# Patient Record
Sex: MALE | Race: BLACK OR AFRICAN AMERICAN | NOT HISPANIC OR LATINO | URBAN - METROPOLITAN AREA
[De-identification: names, ages, dates, MRNs, and addresses within clinical notes are randomized per-mention and may not be internally consistent; named-entity substitution may affect disease eponyms.]

---

## 2017-08-10 ENCOUNTER — EMERGENCY (EMERGENCY)
Facility: HOSPITAL | Age: 82
LOS: 0 days | Discharge: HOME | End: 2017-08-11
Attending: EMERGENCY MEDICINE
Payer: COMMERCIAL

## 2017-08-10 VITALS
TEMPERATURE: 98 F | SYSTOLIC BLOOD PRESSURE: 120 MMHG | HEART RATE: 104 BPM | RESPIRATION RATE: 18 BRPM | OXYGEN SATURATION: 99 % | WEIGHT: 165.35 LBS | DIASTOLIC BLOOD PRESSURE: 65 MMHG

## 2017-08-10 DIAGNOSIS — Z79.82 LONG TERM (CURRENT) USE OF ASPIRIN: ICD-10-CM

## 2017-08-10 DIAGNOSIS — I10 ESSENTIAL (PRIMARY) HYPERTENSION: ICD-10-CM

## 2017-08-10 DIAGNOSIS — M19.90 UNSPECIFIED OSTEOARTHRITIS, UNSPECIFIED SITE: ICD-10-CM

## 2017-08-10 DIAGNOSIS — E11.9 TYPE 2 DIABETES MELLITUS WITHOUT COMPLICATIONS: ICD-10-CM

## 2017-08-10 DIAGNOSIS — M79.602 PAIN IN LEFT ARM: ICD-10-CM

## 2017-08-10 LAB
ALBUMIN SERPL ELPH-MCNC: 3.6 G/DL — SIGNIFICANT CHANGE UP (ref 3.3–5)
ALP SERPL-CCNC: 333 U/L — HIGH (ref 40–120)
ALT FLD-CCNC: 23 U/L — SIGNIFICANT CHANGE UP (ref 12–78)
ANION GAP SERPL CALC-SCNC: 10 MMOL/L — SIGNIFICANT CHANGE UP (ref 5–17)
AST SERPL-CCNC: 18 U/L — SIGNIFICANT CHANGE UP (ref 15–37)
BILIRUB SERPL-MCNC: 0.5 MG/DL — SIGNIFICANT CHANGE UP (ref 0.2–1.2)
BUN SERPL-MCNC: 29 MG/DL — HIGH (ref 7–23)
CALCIUM SERPL-MCNC: 9.3 MG/DL — SIGNIFICANT CHANGE UP (ref 8.5–10.1)
CHLORIDE SERPL-SCNC: 99 MMOL/L — SIGNIFICANT CHANGE UP (ref 96–108)
CO2 SERPL-SCNC: 27 MMOL/L — SIGNIFICANT CHANGE UP (ref 22–31)
CREAT SERPL-MCNC: 1.67 MG/DL — HIGH (ref 0.5–1.3)
ERYTHROCYTE [SEDIMENTATION RATE] IN BLOOD: 86 MM/HR — HIGH (ref 0–20)
GLUCOSE SERPL-MCNC: 229 MG/DL — HIGH (ref 70–99)
HCT VFR BLD CALC: 36.6 % — LOW (ref 39–50)
HGB BLD-MCNC: 11.5 G/DL — LOW (ref 13–17)
MCHC RBC-ENTMCNC: 26.3 PG — LOW (ref 27–34)
MCHC RBC-ENTMCNC: 31.5 GM/DL — LOW (ref 32–36)
MCV RBC AUTO: 83.4 FL — SIGNIFICANT CHANGE UP (ref 80–100)
PLATELET # BLD AUTO: 223 K/UL — SIGNIFICANT CHANGE UP (ref 150–400)
POTASSIUM SERPL-MCNC: 3.6 MMOL/L — SIGNIFICANT CHANGE UP (ref 3.5–5.3)
POTASSIUM SERPL-SCNC: 3.6 MMOL/L — SIGNIFICANT CHANGE UP (ref 3.5–5.3)
PROT SERPL-MCNC: 8.1 GM/DL — SIGNIFICANT CHANGE UP (ref 6–8.3)
RBC # BLD: 4.39 M/UL — SIGNIFICANT CHANGE UP (ref 4.2–5.8)
RBC # FLD: 13.1 % — SIGNIFICANT CHANGE UP (ref 11–15)
SODIUM SERPL-SCNC: 136 MMOL/L — SIGNIFICANT CHANGE UP (ref 135–145)
TROPONIN I SERPL-MCNC: <.015 NG/ML — SIGNIFICANT CHANGE UP (ref 0.01–0.04)
WBC # BLD: 7.7 K/UL — SIGNIFICANT CHANGE UP (ref 3.8–10.5)
WBC # FLD AUTO: 7.7 K/UL — SIGNIFICANT CHANGE UP (ref 3.8–10.5)

## 2017-08-10 PROCEDURE — 73070 X-RAY EXAM OF ELBOW: CPT | Mod: 26,LT

## 2017-08-10 PROCEDURE — 73110 X-RAY EXAM OF WRIST: CPT | Mod: 26,LT

## 2017-08-10 PROCEDURE — 71010: CPT | Mod: 26

## 2017-08-10 PROCEDURE — 99285 EMERGENCY DEPT VISIT HI MDM: CPT

## 2017-08-10 RX ORDER — MORPHINE SULFATE 50 MG/1
2 CAPSULE, EXTENDED RELEASE ORAL ONCE
Qty: 0 | Refills: 0 | Status: DISCONTINUED | OUTPATIENT
Start: 2017-08-10 | End: 2017-08-10

## 2017-08-10 RX ORDER — ACETAMINOPHEN 500 MG
975 TABLET ORAL ONCE
Qty: 0 | Refills: 0 | Status: COMPLETED | OUTPATIENT
Start: 2017-08-10 | End: 2017-08-10

## 2017-08-10 RX ADMIN — MORPHINE SULFATE 2 MILLIGRAM(S): 50 CAPSULE, EXTENDED RELEASE ORAL at 22:08

## 2017-08-10 RX ADMIN — Medication 975 MILLIGRAM(S): at 22:08

## 2017-08-10 NOTE — ED PROVIDER NOTE - OBJECTIVE STATEMENT
89 yo M with acute LUE swelling for 3 days.  Started Monday morning on waking.  Pt. felt severe pain in his joint and couldn't move them.  Pt. denies trauma.  No other complaints currently, no inciting event.    ROS: negative for fever, cough, headache, chest pain, shortness of breath, abd pain, nausea, vomiting, diarrhea, rash, paresthesia, and weakness.   PMH: HTN, IDDM, HLD; Meds: furosemide 20, simvastatin 10, asa 325, nifedipine 60, glipizide 10, levemir insulin; SH: Denies smoking/drinking/drug use

## 2017-08-10 NOTE — ED ADULT TRIAGE NOTE - CHIEF COMPLAINT QUOTE
pt sent from pmd  today for left arm pain 3-4 days, denies injury painful and swollen, No hx gout med hx dm and cholesterol

## 2017-08-10 NOTE — ED PROVIDER NOTE - PHYSICAL EXAMINATION
Vitals: tachy 104, otherwise wnl  Gen: AAOx3, NAD, sitting comfortably in stretcher  Head: ncat, perrla, eomi b/l  Neck: supple, no lymphadenopathy, no midline deviation  Heart: rrr, no m/r/g  Lungs: CTA b/l, no rales/ronchi/wheezes  Abd: soft, nontender, non-distended, no rebound or guarding  Ext: no clubbing/cyanosis/edema, tenderness to palpation of L elbow and L wrist, mild swelling of both areas, no skin lesions or erythema, no signs of infection, limited rom at L elbow and wrist due to pain, no gross deformities.  Pt. has decreased movement and sensation of L digits 2-3 due to prior laceration years ago, no pitting edema, normal cap refill  Neuro: sensation and muscle strength intact b/l, steady gait

## 2017-08-10 NOTE — ED PROVIDER NOTE - MEDICAL DECISION MAKING DETAILS
89 yo M with acute LUE pain and swelling, joint pain  -basic labs, trop, uric acid, crp, esr, us LUE, XR L elbow and wrist, cxr, ekg, pain control  -f/u results

## 2017-08-10 NOTE — ED PROVIDER NOTE - PROGRESS NOTE DETAILS
Vira Lee is pt.'s daughter, leaves contact info  [phone] us negative for DVT, XR shows old healed elbow fracture and soft tissue swelling in the wrist and elbow, also small old fracture of distal radius.  awaiting another elbow view XR for ortho, will likely d/c to daughter in AM attempted calling family, pt. is ready for d/c with outpt. ortho and pcp f/u  Results reported to patient--grossly benign  Pt. reports feeling better after meds  pt. agrees to f/u with primary care outpt.  pt. understands to return to ED if symptoms worsen; will d/c

## 2017-08-11 VITALS
HEART RATE: 79 BPM | DIASTOLIC BLOOD PRESSURE: 71 MMHG | OXYGEN SATURATION: 98 % | TEMPERATURE: 98 F | SYSTOLIC BLOOD PRESSURE: 129 MMHG | RESPIRATION RATE: 18 BRPM

## 2017-08-11 LAB
CRP SERPL-MCNC: 10.6 MG/DL — HIGH (ref 0–0.4)
URATE SERPL-MCNC: 10.9 MG/DL — HIGH (ref 3.4–8.8)

## 2017-08-11 PROCEDURE — 73070 X-RAY EXAM OF ELBOW: CPT | Mod: 26,LT

## 2017-08-11 PROCEDURE — 93010 ELECTROCARDIOGRAM REPORT: CPT

## 2017-08-11 PROCEDURE — 93971 EXTREMITY STUDY: CPT | Mod: 26,LT

## 2017-08-11 NOTE — CONSULT NOTE ADULT - ASSESSMENT
89 M s/p atraumatic pain L Elbow/Wrist x 3 days  -pain likely secondary to gout as he has no signs/symptoms of infection with nml WBC, no fever  -Chronic fracture of L Olecranon and Radial neck, no acute fractures appreciated on x-rays  -Sling for comfort as needed  -Medical management for likely gout, if clincal signs of infection, would consider aspiration of elbow joint  -WBAT  -FU with Orthopedics outpatient PRN, Dr. Thacker

## 2017-08-11 NOTE — CONSULT NOTE ADULT - SUBJECTIVE AND OBJECTIVE BOX
Patient seen & examined in the ER for atraumatic L elbow and wrist pain x 3 days.  Per the patient and daughter (Vira), the pain started suddenly in the wrist and elbow on Monday.  Pain is localized to the wrist and elbow joints with no radiation.  Denies falls, trauma or any other inciting event.  Patient does admit to a fall ~2 yrs prior in which he injured the left elbow.  Denies  new onset numbness or parasthesias of the left arm.  Denies fever/chills.  Patient lives alone and normally ambulates with a walker.    PMH: HLD, HTN, DM  ALL: NKDA    PE:    Afebrile, AVSS    LUE:  skin intact, no erythema/ecchymosis, no lesions  +TTP Left elbow and wrist joints  Mild edema of wrist and elbow with moderate pain to ROM  PROM Elbow , FROM wrist with moderate pain  FROM Shoulder without pain, no TTP shoulder  SILT distally  2+ Rad pulse  Compartments soft  +AIN/PIN/U/R/M n's hand    Imaging:  X-rays L Elbow/Wrist demonstrate chronic olecranon and radial neck fractures with subluxation of the ulnohumeral joint, no acute fractures noted, degenerative changes of the radiohumeral/ulnohumeral joints and carpal joints of the hand.    Labs:  ESR 86/CRP pending  WBC 7.7

## 2017-08-11 NOTE — ED ADULT NURSE NOTE - OBJECTIVE STATEMENT
Pt is 89 y/o male c/o of left arm pain/ swelling. pt denies injury. pt can move fingers, has sensation in extremity

## 2018-02-15 ENCOUNTER — INPATIENT (INPATIENT)
Facility: HOSPITAL | Age: 83
LOS: 6 days | Discharge: NURSING, REHAB OR LONG-TERM CARE | End: 2018-02-22
Attending: FAMILY MEDICINE | Admitting: FAMILY MEDICINE
Payer: COMMERCIAL

## 2018-02-15 VITALS
DIASTOLIC BLOOD PRESSURE: 44 MMHG | WEIGHT: 145.06 LBS | HEART RATE: 56 BPM | RESPIRATION RATE: 17 BRPM | SYSTOLIC BLOOD PRESSURE: 92 MMHG | OXYGEN SATURATION: 96 % | HEIGHT: 65 IN

## 2018-02-15 LAB
GLUCOSE BLDC GLUCOMTR-MCNC: 109 MG/DL — HIGH (ref 70–99)
GLUCOSE BLDC GLUCOMTR-MCNC: 85 MG/DL — SIGNIFICANT CHANGE UP (ref 70–99)

## 2018-02-15 PROCEDURE — 99285 EMERGENCY DEPT VISIT HI MDM: CPT

## 2018-02-15 NOTE — ED ADULT NURSE NOTE - OBJECTIVE STATEMENT
89 y/o male PMHx DM, HTN BIBA for hypoglycemia, found by daughter on kitchen floor, FS 40 at home, D50 given by EMS. Patient awake and AAOX1 at this time, accodring to daughter, patient fell three times tis week

## 2018-02-15 NOTE — ED ADULT TRIAGE NOTE - CHIEF COMPLAINT QUOTE
hypoglycemia 44 , one d50 given 18 left a/c, fall daughter found him on the floor unknown how long was him on the floor

## 2018-02-16 DIAGNOSIS — R93.8 ABNORMAL FINDINGS ON DIAGNOSTIC IMAGING OF OTHER SPECIFIED BODY STRUCTURES: ICD-10-CM

## 2018-02-16 DIAGNOSIS — E16.2 HYPOGLYCEMIA, UNSPECIFIED: ICD-10-CM

## 2018-02-16 DIAGNOSIS — Z65.9 PROBLEM RELATED TO UNSPECIFIED PSYCHOSOCIAL CIRCUMSTANCES: ICD-10-CM

## 2018-02-16 DIAGNOSIS — E11.649 TYPE 2 DIABETES MELLITUS WITH HYPOGLYCEMIA WITHOUT COMA: ICD-10-CM

## 2018-02-16 DIAGNOSIS — Z29.9 ENCOUNTER FOR PROPHYLACTIC MEASURES, UNSPECIFIED: ICD-10-CM

## 2018-02-16 DIAGNOSIS — I10 ESSENTIAL (PRIMARY) HYPERTENSION: ICD-10-CM

## 2018-02-16 LAB
ALBUMIN SERPL ELPH-MCNC: 2.9 G/DL — LOW (ref 3.3–5)
ALP SERPL-CCNC: 414 U/L — HIGH (ref 40–120)
ALT FLD-CCNC: 16 U/L — SIGNIFICANT CHANGE UP (ref 12–78)
ANION GAP SERPL CALC-SCNC: 10 MMOL/L — SIGNIFICANT CHANGE UP (ref 5–17)
APPEARANCE UR: CLEAR — SIGNIFICANT CHANGE UP
APTT BLD: 29.6 SEC — SIGNIFICANT CHANGE UP (ref 27.5–37.4)
AST SERPL-CCNC: 29 U/L — SIGNIFICANT CHANGE UP (ref 15–37)
BACTERIA # UR AUTO: ABNORMAL
BASOPHILS # BLD AUTO: 0.01 K/UL — SIGNIFICANT CHANGE UP (ref 0–0.2)
BASOPHILS NFR BLD AUTO: 0.1 % — SIGNIFICANT CHANGE UP (ref 0–2)
BILIRUB SERPL-MCNC: 0.4 MG/DL — SIGNIFICANT CHANGE UP (ref 0.2–1.2)
BILIRUB UR-MCNC: NEGATIVE — SIGNIFICANT CHANGE UP
BUN SERPL-MCNC: 23 MG/DL — SIGNIFICANT CHANGE UP (ref 7–23)
CALCIUM SERPL-MCNC: 8.7 MG/DL — SIGNIFICANT CHANGE UP (ref 8.5–10.1)
CHLORIDE SERPL-SCNC: 103 MMOL/L — SIGNIFICANT CHANGE UP (ref 96–108)
CK MB BLD-MCNC: 1.4 % — SIGNIFICANT CHANGE UP (ref 0–3.5)
CK MB CFR SERPL CALC: 3.1 NG/ML — SIGNIFICANT CHANGE UP (ref 0.5–3.6)
CK SERPL-CCNC: 220 U/L — SIGNIFICANT CHANGE UP (ref 26–308)
CO2 SERPL-SCNC: 26 MMOL/L — SIGNIFICANT CHANGE UP (ref 22–31)
COLOR SPEC: YELLOW — SIGNIFICANT CHANGE UP
CREAT SERPL-MCNC: 1 MG/DL — SIGNIFICANT CHANGE UP (ref 0.5–1.3)
DIFF PNL FLD: ABNORMAL
EOSINOPHIL # BLD AUTO: 0 K/UL — SIGNIFICANT CHANGE UP (ref 0–0.5)
EOSINOPHIL NFR BLD AUTO: 0 % — SIGNIFICANT CHANGE UP (ref 0–6)
GLUCOSE BLDC GLUCOMTR-MCNC: 154 MG/DL — HIGH (ref 70–99)
GLUCOSE BLDC GLUCOMTR-MCNC: 62 MG/DL — LOW (ref 70–99)
GLUCOSE SERPL-MCNC: 60 MG/DL — LOW (ref 70–99)
GLUCOSE UR QL: NEGATIVE MG/DL — SIGNIFICANT CHANGE UP
HCT VFR BLD CALC: 28.6 % — LOW (ref 39–50)
HGB BLD-MCNC: 9.3 G/DL — LOW (ref 13–17)
IMM GRANULOCYTES NFR BLD AUTO: 0.6 % — SIGNIFICANT CHANGE UP (ref 0–1.5)
INR BLD: 1.22 RATIO — HIGH (ref 0.88–1.16)
KETONES UR-MCNC: NEGATIVE — SIGNIFICANT CHANGE UP
LACTATE SERPL-SCNC: 1.2 MMOL/L — SIGNIFICANT CHANGE UP (ref 0.7–2)
LEUKOCYTE ESTERASE UR-ACNC: NEGATIVE — SIGNIFICANT CHANGE UP
LYMPHOCYTES # BLD AUTO: 0.82 K/UL — LOW (ref 1–3.3)
LYMPHOCYTES # BLD AUTO: 12.1 % — LOW (ref 13–44)
MCHC RBC-ENTMCNC: 26 PG — LOW (ref 27–34)
MCHC RBC-ENTMCNC: 32.5 GM/DL — SIGNIFICANT CHANGE UP (ref 32–36)
MCV RBC AUTO: 79.9 FL — LOW (ref 80–100)
MONOCYTES # BLD AUTO: 0.81 K/UL — SIGNIFICANT CHANGE UP (ref 0–0.9)
MONOCYTES NFR BLD AUTO: 11.9 % — SIGNIFICANT CHANGE UP (ref 2–14)
NEUTROPHILS # BLD AUTO: 5.11 K/UL — SIGNIFICANT CHANGE UP (ref 1.8–7.4)
NEUTROPHILS NFR BLD AUTO: 75.3 % — SIGNIFICANT CHANGE UP (ref 43–77)
NITRITE UR-MCNC: NEGATIVE — SIGNIFICANT CHANGE UP
NRBC # BLD: 0 /100 WBCS — SIGNIFICANT CHANGE UP (ref 0–0)
PH UR: 6 — SIGNIFICANT CHANGE UP (ref 5–8)
PLATELET # BLD AUTO: 236 K/UL — SIGNIFICANT CHANGE UP (ref 150–400)
POTASSIUM SERPL-MCNC: 3.6 MMOL/L — SIGNIFICANT CHANGE UP (ref 3.5–5.3)
POTASSIUM SERPL-SCNC: 3.6 MMOL/L — SIGNIFICANT CHANGE UP (ref 3.5–5.3)
PROT SERPL-MCNC: 6.8 GM/DL — SIGNIFICANT CHANGE UP (ref 6–8.3)
PROT UR-MCNC: 30 MG/DL
PROTHROM AB SERPL-ACNC: 13.4 SEC — HIGH (ref 9.8–12.7)
RBC # BLD: 3.58 M/UL — LOW (ref 4.2–5.8)
RBC # FLD: 17.6 % — HIGH (ref 10.3–14.5)
RBC CASTS # UR COMP ASSIST: ABNORMAL /HPF (ref 0–4)
SODIUM SERPL-SCNC: 139 MMOL/L — SIGNIFICANT CHANGE UP (ref 135–145)
SP GR SPEC: 1.01 — SIGNIFICANT CHANGE UP (ref 1.01–1.02)
TROPONIN I SERPL-MCNC: <.015 NG/ML — SIGNIFICANT CHANGE UP (ref 0.01–0.04)
UROBILINOGEN FLD QL: NEGATIVE MG/DL — SIGNIFICANT CHANGE UP
WBC # BLD: 6.79 K/UL — SIGNIFICANT CHANGE UP (ref 3.8–10.5)
WBC # FLD AUTO: 6.79 K/UL — SIGNIFICANT CHANGE UP (ref 3.8–10.5)
WBC UR QL: SIGNIFICANT CHANGE UP

## 2018-02-16 PROCEDURE — 93010 ELECTROCARDIOGRAM REPORT: CPT

## 2018-02-16 PROCEDURE — 70450 CT HEAD/BRAIN W/O DYE: CPT | Mod: 26

## 2018-02-16 PROCEDURE — 12345: CPT | Mod: NC

## 2018-02-16 PROCEDURE — 99223 1ST HOSP IP/OBS HIGH 75: CPT

## 2018-02-16 PROCEDURE — 71045 X-RAY EXAM CHEST 1 VIEW: CPT | Mod: 26

## 2018-02-16 PROCEDURE — 72125 CT NECK SPINE W/O DYE: CPT | Mod: 26

## 2018-02-16 RX ORDER — SIMVASTATIN 20 MG/1
10 TABLET, FILM COATED ORAL AT BEDTIME
Qty: 0 | Refills: 0 | Status: DISCONTINUED | OUTPATIENT
Start: 2018-02-16 | End: 2018-02-22

## 2018-02-16 RX ORDER — FUROSEMIDE 40 MG
20 TABLET ORAL DAILY
Qty: 0 | Refills: 0 | Status: DISCONTINUED | OUTPATIENT
Start: 2018-02-16 | End: 2018-02-22

## 2018-02-16 RX ORDER — LISINOPRIL 2.5 MG/1
40 TABLET ORAL DAILY
Qty: 0 | Refills: 0 | Status: DISCONTINUED | OUTPATIENT
Start: 2018-02-16 | End: 2018-02-21

## 2018-02-16 RX ORDER — SODIUM CHLORIDE 9 MG/ML
1000 INJECTION, SOLUTION INTRAVENOUS
Qty: 0 | Refills: 0 | Status: DISCONTINUED | OUTPATIENT
Start: 2018-02-16 | End: 2018-02-21

## 2018-02-16 RX ORDER — HEPARIN SODIUM 5000 [USP'U]/ML
5000 INJECTION INTRAVENOUS; SUBCUTANEOUS EVERY 8 HOURS
Qty: 0 | Refills: 0 | Status: DISCONTINUED | OUTPATIENT
Start: 2018-02-16 | End: 2018-02-22

## 2018-02-16 RX ORDER — ASPIRIN/CALCIUM CARB/MAGNESIUM 324 MG
325 TABLET ORAL DAILY
Qty: 0 | Refills: 0 | Status: DISCONTINUED | OUTPATIENT
Start: 2018-02-16 | End: 2018-02-22

## 2018-02-16 RX ADMIN — HEPARIN SODIUM 5000 UNIT(S): 5000 INJECTION INTRAVENOUS; SUBCUTANEOUS at 22:22

## 2018-02-16 RX ADMIN — SODIUM CHLORIDE 80 MILLILITER(S): 9 INJECTION, SOLUTION INTRAVENOUS at 07:05

## 2018-02-16 RX ADMIN — HEPARIN SODIUM 5000 UNIT(S): 5000 INJECTION INTRAVENOUS; SUBCUTANEOUS at 13:20

## 2018-02-16 RX ADMIN — SODIUM CHLORIDE 80 MILLILITER(S): 9 INJECTION, SOLUTION INTRAVENOUS at 11:29

## 2018-02-16 RX ADMIN — SIMVASTATIN 10 MILLIGRAM(S): 20 TABLET, FILM COATED ORAL at 22:22

## 2018-02-16 RX ADMIN — Medication 325 MILLIGRAM(S): at 11:29

## 2018-02-16 NOTE — CHART NOTE - NSCHARTNOTEFT_GEN_A_CORE
seen and examined, fs improved. Hold dm meds, f/u a1c , would hold off on dm meds if a1c<7.5  sw, nutrition/pt eval to be done                          9.3    6.79  )-----------( 236      ( 2018 03:33 )             28.6         139  |  103  |  23  ----------------------------<  60<L>  3.6   |  26  |  1.00    Ca    8.7      2018 03:33    TPro  6.8  /  Alb  2.9<L>  /  TBili  0.4  /  DBili  x   /  AST  29  /  ALT  16  /  AlkPhos  414<H>      PT/INR - ( 2018 03:33 )   PT: 13.4 sec;   INR: 1.22 ratio         PTT - ( 2018 03:33 )  PTT:29.6 sec  Urinalysis Basic - ( 2018 03:33 )    Color: Yellow / Appearance: Clear / S.010 / pH: x  Gluc: x / Ketone: Negative  / Bili: Negative / Urobili: Negative mg/dL   Blood: x / Protein: 30 mg/dL / Nitrite: Negative   Leuk Esterase: Negative / RBC: 3-5 /HPF / WBC 0-2   Sq Epi: x / Non Sq Epi: x / Bacteria: Moderate        Vital Signs Last 24 Hrs  T(C): 36.8 (2018 11:20), Max: 36.8 (2018 07:23)  T(F): 98.2 (2018 11:20), Max: 98.2 (2018 07:23)  HR: 72 (2018 11:20) (56 - 75)  BP: 154/52 (2018 11:20) (92/44 - 154/52)  BP(mean): --  RR: 17 (2018 11:20) (14 - 18)  SpO2: 99% (2018 11:20) (96% - 99%)

## 2018-02-16 NOTE — ED PROVIDER NOTE - PHYSICAL EXAMINATION
Gen: Alert, NAD, well appearing  Head: NC, +right forehead superficial abrasion, PERRL, EOMI, normal lids/conjunctiva  ENT: normal hearing, patent oropharynx without erythema/exudate, uvula midline  Neck: +supple, no tenderness/meningismus/JVD, +Trachea midline  Pulm: Bilateral BS, normal resp effort, no wheeze/stridor/retractions  CV: RRR, no M/R/G, +dist pulses  Abd: soft, NT/ND, +BS  Mskel: no edema/erythema/cyanosis  Skin: no rash, warm/dry  Neuro: No sensory/motor deficits, CN 2-12 intact

## 2018-02-16 NOTE — H&P ADULT - HISTORY OF PRESENT ILLNESS
Admitted for hypoglycemia on sulf, in progress. 89 year old man with PMH HTN, DM2, and HLD presents with complaint of low blood sugar and LOC.  As per patient he lives at home and was brought in after his family could not get in touch with him.  He states that he ate breakfast then blacked out; he cannot remember anything else and cannot provide much history.  Offers no complaints now.  He says he takes glipizide daily and has not been eating very much over the last few days.  He is normally compliant with diet and all meds.  Offers no physical complaints when seen in ED.    In the ED, pt's BG noted to be dropping; fell to 62, was given orange juice.  Improved to 154, 80 this AM.

## 2018-02-16 NOTE — H&P ADULT - PROBLEM SELECTOR PLAN 1
Monitor FS q1h  CHO diet  Diabetic teaching  D5NS @ 80 for now, discontinue and start sliding scale as appropriate

## 2018-02-16 NOTE — H&P ADULT - PROBLEM SELECTOR PLAN 3
Lytic lesion found incidentally   Obtain history from PMD as patient unsure; says he thinks he's been told about abnormal bone lesions in the past.  Can be worked up as outpatient if new finding.

## 2018-02-16 NOTE — PHYSICAL THERAPY INITIAL EVALUATION ADULT - MODIFIED CLINICAL TEST OF SENSORY INTEGRATION IN BALANCE TEST
Barthel Index: Feeding Score _10__, Bathing Score _0__, Grooming Score __5_, Dressing Score _10__, Bowels Score __5_, Bladder Score __5_, Toilet Score __5_, Transfers Score __5_, Mobility Score _0__, Stairs Score __0_,     Total Score _45__

## 2018-02-16 NOTE — H&P ADULT - ASSESSMENT
89 year old man with PMH HTN, DM2, and HLD presents with complaint of low blood sugar and LOC.  Signs, symptoms, and work up consistent with hypoglycemia on glipizide XR.  Patient will require admission for close blood glucose monitoring for the next 24 hours as detailed below:    IMPROVE VTE Individual Risk Assessment          RISK                                                          Points    [  ] Previous VTE                                                3    [  ] Thrombophilia                                             2    [  ] Lower limb paralysis                                    2        (unable to hold up >15 seconds)      [  ] Current Cancer                                             2         (within 6 months)    [ x ] Immobilization > 24 hrs                              1    [  ] ICU/CCU stay > 24 hours                            1    [  x] Age > 60                                                    1    IMPROVE VTE Score _____2____

## 2018-02-16 NOTE — ED ADULT NURSE REASSESSMENT NOTE - NS ED NURSE REASSESS COMMENT FT1
FS 85, Orange juice given
FS 85, MD Mays made aware, Dextrose 50% given at 0323 hours
pt received at the bedside A&O x3, 18G left AC, D5NS @ 80ml/hr, bed in lowest position, wheels locked. plan of care explained.

## 2018-02-16 NOTE — ED PROVIDER NOTE - OBJECTIVE STATEMENT
Pertinent PMH/PSH/FHx/SHx and Review of Systems contained within:  Patient presents to the ED for hypoglycemia and syncope.  Patient is poor historian, no family at bedside.  Called daughter Osbaldo Lee who says that she was calling the home, patient lives alone.  Patient was not answering, found completely unconscious on kitchen floor.  Daughter says that he has fallen a few times over the last week.  EMS arrived and FS was 44, however, it is unknown how long he was down. Patient is currently awake and somewhat alert, amnestic to events.     Relevant PMHx/SHx/SOCHx/FAMH:  DM, HTN, renal insufficiency, mild dementia  Patient denies EtOH/tobacco/illicit substance use.  PMD: Dr. Anglin

## 2018-02-16 NOTE — H&P ADULT - NSHPPHYSICALEXAM_GEN_ALL_CORE
GENERAL: NAD, well-groomed, well-developed  HEAD:  Atraumatic, Normocephalic  EYES: EOMI, PERRLA, conjunctiva and sclera clear  ENMT: No tonsillar erythema, exudates, or enlargement; Moist mucous membranes, No lesions  NECK: Supple, No JVD, Normal thyroid  NERVOUS SYSTEM:  Alert & Oriented X3, Good concentration strength 5/5, CN 2-12 intact  CHEST/LUNG: Clear to ascultation bilaterally; No rales, rhonchi, wheezing, or rubs  HEART: Regular rate and rhythm; No murmurs, rubs, or gallops  ABDOMEN: Soft, Nontender, Nondistended; Bowel sounds present  EXTREMITIES: No clubbing, cyanosis, or edema  SKIN: no rashes or lesions   PSYCH: normal affect and behavior

## 2018-02-16 NOTE — H&P ADULT - PROBLEM SELECTOR PLAN 4
Patient states that he's been having more trouble caring for himself.  Was to be set up with [facility] "at some point", unclear on progress.  PT and SW consults to ensure safe DC

## 2018-02-16 NOTE — H&P ADULT - NSHPLABSRESULTS_GEN_ALL_CORE
Vital Signs Last 24 Hrs  T(C): 36.8 (2018 07:23), Max: 36.8 (2018 07:23)  T(F): 98.2 (:), Max: 98.2 (2018 07:23)  HR: 69 (:) (56 - 75)  BP: 137/62 (:) (92/44 - 146/61)  BP(mean): --  RR: 14 (:23) (14 - 17)  SpO2: 98% (:) (96% - 98%)        LABS:                        9.3    6.79  )-----------( 236      ( 2018 03:33 )             28.6     02-    139  |  103  |  23  ----------------------------<  60<L>  3.6   |  26  |  1.00    Ca    8.7      2018 03:33    TPro  6.8  /  Alb  2.9<L>  /  TBili  0.4  /  DBili  x   /  AST  29  /  ALT  16  /  AlkPhos  414<H>      PT/INR - ( 2018 03:33 )   PT: 13.4 sec;   INR: 1.22 ratio         PTT - ( 2018 03:33 )  PTT:29.6 sec  Urinalysis Basic - ( 2018 03:33 )    Color: Yellow / Appearance: Clear / S.010 / pH: x  Gluc: x / Ketone: Negative  / Bili: Negative / Urobili: Negative mg/dL   Blood: x / Protein: 30 mg/dL / Nitrite: Negative   Leuk Esterase: Negative / RBC: 3-5 /HPF / WBC 0-2   Sq Epi: x / Non Sq Epi: x / Bacteria: Moderate        RADIOLOGY & ADDITIONAL STUDIES:    CT head:  IMPRESSION: No intracranial hemorrhage or depressed calvarial fracture.    CT Cspine:  IMPRESSION: No acute fracture or dislocation of the cervical spine.    Mixed lytic and sclerotic appearance of the bones suggests metastases.   Correlate with patient's clinical history.

## 2018-02-16 NOTE — ED PROVIDER NOTE - MEDICAL DECISION MAKING DETAILS
Patient with syncope, likely hypoglycemia related, patient on glipizide. Labs, CT, UA, EKG results reviewed.  Patient is to be admitted to the hospital and the case was discussed with the admitting physician.  Any changes in plan, additional imaging/labs, and further work up will be at the discretion of the admitting physician.

## 2018-02-17 LAB
ANION GAP SERPL CALC-SCNC: 8 MMOL/L — SIGNIFICANT CHANGE UP (ref 5–17)
BUN SERPL-MCNC: 17 MG/DL — SIGNIFICANT CHANGE UP (ref 7–23)
CALCIUM SERPL-MCNC: 8.5 MG/DL — SIGNIFICANT CHANGE UP (ref 8.5–10.1)
CHLORIDE SERPL-SCNC: 104 MMOL/L — SIGNIFICANT CHANGE UP (ref 96–108)
CO2 SERPL-SCNC: 27 MMOL/L — SIGNIFICANT CHANGE UP (ref 22–31)
CREAT SERPL-MCNC: 1 MG/DL — SIGNIFICANT CHANGE UP (ref 0.5–1.3)
CULTURE RESULTS: SIGNIFICANT CHANGE UP
GLUCOSE SERPL-MCNC: 122 MG/DL — HIGH (ref 70–99)
HBA1C BLD-MCNC: 7 % — HIGH (ref 4–5.6)
HCT VFR BLD CALC: 28.3 % — LOW (ref 39–50)
HGB BLD-MCNC: 9 G/DL — LOW (ref 13–17)
MCHC RBC-ENTMCNC: 25.8 PG — LOW (ref 27–34)
MCHC RBC-ENTMCNC: 31.8 GM/DL — LOW (ref 32–36)
MCV RBC AUTO: 81.1 FL — SIGNIFICANT CHANGE UP (ref 80–100)
NRBC # BLD: 0 /100 WBCS — SIGNIFICANT CHANGE UP (ref 0–0)
PLATELET # BLD AUTO: 246 K/UL — SIGNIFICANT CHANGE UP (ref 150–400)
POTASSIUM SERPL-MCNC: 3.7 MMOL/L — SIGNIFICANT CHANGE UP (ref 3.5–5.3)
POTASSIUM SERPL-SCNC: 3.7 MMOL/L — SIGNIFICANT CHANGE UP (ref 3.5–5.3)
RBC # BLD: 3.49 M/UL — LOW (ref 4.2–5.8)
RBC # FLD: 17.7 % — HIGH (ref 10.3–14.5)
SODIUM SERPL-SCNC: 139 MMOL/L — SIGNIFICANT CHANGE UP (ref 135–145)
SPECIMEN SOURCE: SIGNIFICANT CHANGE UP
WBC # BLD: 5.29 K/UL — SIGNIFICANT CHANGE UP (ref 3.8–10.5)
WBC # FLD AUTO: 5.29 K/UL — SIGNIFICANT CHANGE UP (ref 3.8–10.5)

## 2018-02-17 PROCEDURE — 99232 SBSQ HOSP IP/OBS MODERATE 35: CPT

## 2018-02-17 RX ADMIN — Medication 20 MILLIGRAM(S): at 05:41

## 2018-02-17 RX ADMIN — Medication 325 MILLIGRAM(S): at 11:09

## 2018-02-17 RX ADMIN — HEPARIN SODIUM 5000 UNIT(S): 5000 INJECTION INTRAVENOUS; SUBCUTANEOUS at 05:41

## 2018-02-17 RX ADMIN — SODIUM CHLORIDE 80 MILLILITER(S): 9 INJECTION, SOLUTION INTRAVENOUS at 22:08

## 2018-02-17 RX ADMIN — HEPARIN SODIUM 5000 UNIT(S): 5000 INJECTION INTRAVENOUS; SUBCUTANEOUS at 22:08

## 2018-02-17 RX ADMIN — HEPARIN SODIUM 5000 UNIT(S): 5000 INJECTION INTRAVENOUS; SUBCUTANEOUS at 14:11

## 2018-02-17 RX ADMIN — LISINOPRIL 40 MILLIGRAM(S): 2.5 TABLET ORAL at 05:40

## 2018-02-17 RX ADMIN — SIMVASTATIN 10 MILLIGRAM(S): 20 TABLET, FILM COATED ORAL at 22:08

## 2018-02-18 LAB
HCT VFR BLD CALC: 32.8 % — LOW (ref 39–50)
HGB BLD-MCNC: 10 G/DL — LOW (ref 13–17)
MCHC RBC-ENTMCNC: 25.6 PG — LOW (ref 27–34)
MCHC RBC-ENTMCNC: 30.5 GM/DL — LOW (ref 32–36)
MCV RBC AUTO: 83.9 FL — SIGNIFICANT CHANGE UP (ref 80–100)
NRBC # BLD: 0 /100 WBCS — SIGNIFICANT CHANGE UP (ref 0–0)
PLATELET # BLD AUTO: 178 K/UL — SIGNIFICANT CHANGE UP (ref 150–400)
PSA FLD-MCNC: 3716 NG/ML — HIGH (ref 0–4)
RBC # BLD: 3.91 M/UL — LOW (ref 4.2–5.8)
RBC # FLD: 17.8 % — HIGH (ref 10.3–14.5)
WBC # BLD: 4.84 K/UL — SIGNIFICANT CHANGE UP (ref 3.8–10.5)
WBC # FLD AUTO: 4.84 K/UL — SIGNIFICANT CHANGE UP (ref 3.8–10.5)

## 2018-02-18 PROCEDURE — 99233 SBSQ HOSP IP/OBS HIGH 50: CPT

## 2018-02-18 RX ADMIN — SIMVASTATIN 10 MILLIGRAM(S): 20 TABLET, FILM COATED ORAL at 22:00

## 2018-02-18 RX ADMIN — Medication 20 MILLIGRAM(S): at 06:17

## 2018-02-18 RX ADMIN — HEPARIN SODIUM 5000 UNIT(S): 5000 INJECTION INTRAVENOUS; SUBCUTANEOUS at 22:00

## 2018-02-18 RX ADMIN — HEPARIN SODIUM 5000 UNIT(S): 5000 INJECTION INTRAVENOUS; SUBCUTANEOUS at 06:17

## 2018-02-18 RX ADMIN — Medication 325 MILLIGRAM(S): at 11:17

## 2018-02-18 RX ADMIN — HEPARIN SODIUM 5000 UNIT(S): 5000 INJECTION INTRAVENOUS; SUBCUTANEOUS at 13:10

## 2018-02-18 NOTE — DIETITIAN INITIAL EVALUATION ADULT. - ENERGY NEEDS
Height (cm): 165.1 (02-15)  Weight (kg): 58.5 (02-18)  BMI (kg/m2): 21.4 (02-18)  Ideal Body Weight: 56.8 kg +/- 10%   % Ideal Body Weight: 100%

## 2018-02-18 NOTE — DIETITIAN INITIAL EVALUATION ADULT. - PHYSICAL APPEARANCE
well nourished/BMI 21.4; Nutrition focused physical exam conducted;  Subcutaneous fat loss: [mild] Orbital fat pads region, [mild ]Buccal fat region, [moderate ]Triceps region,  [unable ]Ribs region.  Muscle wasting: [mild ]Temples region, [severe ]Clavicle region, [mild ]Shoulder region, [mild ]Scapula region, [moderate ]Interosseous region,  [mild ]thigh region, [mild ]Calf region

## 2018-02-18 NOTE — CHART NOTE - NSCHARTNOTEFT_GEN_A_CORE
Upon Nutritional Assessment by the Registered Dietitian your patient was determined to meet criteria / has evidence of the following diagnosis/diagnoses:          [ ]  Mild Protein Calorie Malnutrition        [x ]  Moderate Protein Calorie Malnutrition        [ ] Severe Protein Calorie Malnutrition        [ ] Unspecified Protein Calorie Malnutrition        [ ] Underweight / BMI <19        [ ] Morbid Obesity / BMI > 40      Findings as based on:  •  Comprehensive nutrition assessment and consultation  •  Calorie counts (nutrient intake analysis)  •  Food acceptance and intake status from observations by staff  •  Follow up  •  Patient education  •  Intervention secondary to interdisciplinary rounds  •   concerns      Treatment:    The following diet has been recommended:  SOFT; CCHO c evening snack; Glucerna x2/day (440 kcals, 20g pro)    PROVIDER Section:     By signing this assessment you are acknowledging and agree with the diagnosis/diagnoses assigned by the Registered Dietitian    Comments:

## 2018-02-18 NOTE — DIETITIAN INITIAL EVALUATION ADULT. - SIGNS/SYMPTOMS
physical signs of mild fat loss & muscle wasting as noted above; <75% of EER x >1 month physical signs of mild malnutrition as noted above; 21% wt loss x 6 mos; <75% of EER x >1 month

## 2018-02-18 NOTE — DIETITIAN INITIAL EVALUATION ADULT. - ORAL INTAKE PTA
pt lives alone c his cat. daughter grocery shops and cooks for him sometimes but he hasn't been eating much. natural teeth; requested soft diet/poor pt lives alone c his cat. daughter grocery shops and cooks for him but he hasn't been eating much. daughter said he has various reasons he doesn't eat. sometimes he says he doesn't eat because he forgets to eat, soemtimes he says the food has no flavor, sometimes he says it gets caught in his throat and he has difficulty swallowing, sometimes he says its hard to chew (natural teeth); requested soft diet. food preferences taken from daughter./poor

## 2018-02-18 NOTE — DIETITIAN INITIAL EVALUATION ADULT. - OTHER INFO
pt seen due to MD consult for hypoglycemia/dm education. pt is a 89 y.o. M living alone. reports he definitely lost wt but unsure of timeframe. he said he was 147#. denies N/V. pt came in c hypoglycemia, passed out at home. pt takes glipizide to control dm, HgA1c 7.0% pt seen due to MD consult for hypoglycemia/dm education. pt is a 89 y.o. M living alone.  denies N/V. pt came in c hypoglycemia, passed out at home. pt takes glipizide to control dm, HgA1c 7.0%

## 2018-02-18 NOTE — DIETITIAN INITIAL EVALUATION ADULT. - PERTINENT LABORATORY DATA
02-17 Na139 mmol/L Glu 122 mg/dL<H> K+ 3.7 mmol/L Cr  1.00 mg/dL BUN 17 mg/dL Phos n/a   Alb n/a   PAB n/a

## 2018-02-19 PROCEDURE — 99233 SBSQ HOSP IP/OBS HIGH 50: CPT

## 2018-02-19 RX ADMIN — HEPARIN SODIUM 5000 UNIT(S): 5000 INJECTION INTRAVENOUS; SUBCUTANEOUS at 13:00

## 2018-02-19 RX ADMIN — HEPARIN SODIUM 5000 UNIT(S): 5000 INJECTION INTRAVENOUS; SUBCUTANEOUS at 06:38

## 2018-02-19 RX ADMIN — Medication 325 MILLIGRAM(S): at 11:12

## 2018-02-19 RX ADMIN — SIMVASTATIN 10 MILLIGRAM(S): 20 TABLET, FILM COATED ORAL at 22:12

## 2018-02-19 RX ADMIN — HEPARIN SODIUM 5000 UNIT(S): 5000 INJECTION INTRAVENOUS; SUBCUTANEOUS at 22:12

## 2018-02-19 RX ADMIN — Medication 20 MILLIGRAM(S): at 06:39

## 2018-02-19 RX ADMIN — LISINOPRIL 40 MILLIGRAM(S): 2.5 TABLET ORAL at 06:39

## 2018-02-20 ENCOUNTER — TRANSCRIPTION ENCOUNTER (OUTPATIENT)
Age: 83
End: 2018-02-20

## 2018-02-20 LAB
ANION GAP SERPL CALC-SCNC: 10 MMOL/L — SIGNIFICANT CHANGE UP (ref 5–17)
BUN SERPL-MCNC: 20 MG/DL — SIGNIFICANT CHANGE UP (ref 7–23)
CALCIUM SERPL-MCNC: 8.8 MG/DL — SIGNIFICANT CHANGE UP (ref 8.5–10.1)
CHLORIDE SERPL-SCNC: 105 MMOL/L — SIGNIFICANT CHANGE UP (ref 96–108)
CO2 SERPL-SCNC: 25 MMOL/L — SIGNIFICANT CHANGE UP (ref 22–31)
CREAT SERPL-MCNC: 1.25 MG/DL — SIGNIFICANT CHANGE UP (ref 0.5–1.3)
GLUCOSE SERPL-MCNC: 192 MG/DL — HIGH (ref 70–99)
POTASSIUM SERPL-MCNC: 3.9 MMOL/L — SIGNIFICANT CHANGE UP (ref 3.5–5.3)
POTASSIUM SERPL-SCNC: 3.9 MMOL/L — SIGNIFICANT CHANGE UP (ref 3.5–5.3)
SODIUM SERPL-SCNC: 140 MMOL/L — SIGNIFICANT CHANGE UP (ref 135–145)

## 2018-02-20 PROCEDURE — 99233 SBSQ HOSP IP/OBS HIGH 50: CPT

## 2018-02-20 RX ADMIN — Medication 20 MILLIGRAM(S): at 05:28

## 2018-02-20 RX ADMIN — LISINOPRIL 40 MILLIGRAM(S): 2.5 TABLET ORAL at 05:28

## 2018-02-20 RX ADMIN — HEPARIN SODIUM 5000 UNIT(S): 5000 INJECTION INTRAVENOUS; SUBCUTANEOUS at 05:28

## 2018-02-20 RX ADMIN — HEPARIN SODIUM 5000 UNIT(S): 5000 INJECTION INTRAVENOUS; SUBCUTANEOUS at 13:04

## 2018-02-20 RX ADMIN — Medication 325 MILLIGRAM(S): at 11:07

## 2018-02-20 RX ADMIN — HEPARIN SODIUM 5000 UNIT(S): 5000 INJECTION INTRAVENOUS; SUBCUTANEOUS at 21:43

## 2018-02-20 RX ADMIN — SIMVASTATIN 10 MILLIGRAM(S): 20 TABLET, FILM COATED ORAL at 21:43

## 2018-02-20 NOTE — DISCHARGE NOTE ADULT - MEDICATION SUMMARY - MEDICATIONS TO STOP TAKING
I will STOP taking the medications listed below when I get home from the hospital:    glipiZIDE extended release 10 mg oral tablet, extended release  -- 1 tab(s) by mouth 2 times a day    predniSONE 20 mg oral tablet  -- 1 tab(s) by mouth 2 times a day  -- It is very important that you take or use this exactly as directed.  Do not skip doses or discontinue unless directed by your doctor.  Obtain medical advice before taking any non-prescription drugs as some may affect the action of this medication.  Take with food or milk.    Benadryl 25 mg oral tablet  -- 2 tab(s) by mouth 2 times a day  -- May cause drowsiness.  Alcohol may intensify this effect.  Use care when operating dangerous machinery.  Obtain medical advice before taking any non-prescription drugs as some may affect the action of this medication.

## 2018-02-20 NOTE — DISCHARGE NOTE ADULT - HOSPITAL COURSE
89 year old man with PMH HTN, DM2, and HLD presents with complaint of low blood sugar and LOC.  As per patient he lives at home and was brought in after his family could not get in touch with him.  He states that he ate breakfast then blacked out; he cannot remember anything else and cannot provide much history.  Offers no complaints now.  He says he takes glipizide daily and has not been eating very much over the last few days.  He is normally compliant with diet and all meds.  Offers no physical complaints when seen in ED.    In the ED, pt's BG noted to be dropping; fell to 62, was given orange juice.  Improved to 154, 80 this AM. (16 Feb 2018 07:23)  SUBJECTIVE & OBJECTIVE: Pt seen and examined at bedside.  No acute complaints overnight.  Denies dizziness, shortness of breath, chest pain, or other symptom.    PHYSICAL EXAM:  T(C): 36.4 (02-19-18 @ 05:18), Max: 36.6 (02-18-18 @ 17:32)  HR: 70 (02-19-18 @ 05:18) (70 - 82)  BP: 138/37 (02-19-18 @ 05:18) (87/51 - 138/37)  RR: 18 (02-19-18 @ 05:18) (18 - 18)  SpO2: 97% (02-19-18 @ 05:18) (97% - 97%)    GENERAL: NAD, well-groomed, well-developed  HEAD:  Atraumatic, Normocephalic  EYES: EOMI, PERRLA, conjunctiva and sclera clear  ENMT: Moist mucous membranes  NECK: Supple, No JVD  NERVOUS SYSTEM:  Alert & Oriented X3, Motor Strength 5/5 B/L upper and lower extremities; DTRs 2+ intact and symmetric  CHEST/LUNG: Clear to auscultation bilaterally; No rales, rhonchi, wheezing, or rubs  HEART: Regular rate and rhythm; No murmurs, rubs, or gallops  ABDOMEN: Soft, Nontender, Nondistended; Bowel sounds present  EXTREMITIES:  2+ Peripheral Pulses, No clubbing, cyanosis, or edema  LABS:                        10.0   4.84  )-----------( 178      ( 18 Feb 2018 07:11 )             32.8     POCT Blood Glucose.: 227 mg/dL (19 Feb 2018 10:41)  POCT Blood Glucose.: 199 mg/dL (19 Feb 2018 07:37)  POCT Blood Glucose.: 282 mg/dL (18 Feb 2018 22:05)  POCT Blood Glucose.: 268 mg/dL (18 Feb 2018 15:57)  Prostate Ca Screen, PSA Total (02.18.18 @ 13:39)    Prostate Ca Screen, PSA Total: 3716.00: Test Repeated  Serum PSA is not an absolute test for malignancy. The PSA value  should be used in conjunction with information available from  clinical and other diagnostic procedures.  METHOD: Roche EIA ng/mL    RECENT CULTURES:  Culture - Urine (02.16.18 @ 10:12)    Specimen Source: .Urine Clean Catch (Midstream)    Culture Results:   Culture grew 3 or more types of organisms which indicate  collection contamination; consider recollection only if clinically  indicated.    RADIOLOGY & ADDITIONAL TESTS:  < from: CT Cervical Spine No Cont (02.16.18 @ 04:35) >  IMPRESSION: No acute fracture or dislocation of the cervical spine.    Mixed lytic and sclerotic appearance of the bones suggests metastases.   Correlate with patient's clinical history.    < end of copied text >    89 year old man with PMH HTN, DM2, and HLD presents with complaint of low blood sugar and LOC.  Signs, symptoms, and work up consistent with hypoglycemia on glipizide XR.    Problem/Plan - 1:  ·  Problem: Hypoglycemia.  Plan: follow accucheck QAC and HS  CHO diet  Diabetic teaching  c/w H[facility]    Problem/Plan - 2:  ·  Problem: Type 2 diabetes mellitus with hypoglycemia without coma, without long-term current use of insulin.  Plan: Meds on hold for now  D5NS as above  Resume coverage as appropriate.     Problem/Plan - 3:  ·  Problem: Abnormal CT scan, neck.  Plan: Lytic lesion found incidentally   Obtain history from PMD as patient unsure; says he thinks he's been told about abnormal bone lesions in the past.  PSA elevated.  Likely metastatic prostate CA  pending heme/onc followup.   Can be worked up as outpatient if new finding.     Problem/Plan - 4:  ·  Problem: Social problem.  Plan: Patient states that he's been having more trouble caring for himself.  Was to be set up with [facility] "at some point", unclear on progress.  PT and SW consults to ensure safe DC- awaitin g skilled nursing facility placement.     Problem/Plan - 5:  ·  Problem: Primary hypertension.  Plan: Continue home meds. 89 year old man with PMH HTN, DM2, and HLD presents with complaint of low blood sugar and LOC.  As per patient he lives at home and was brought in after his family could not get in touch with him.  He states that he ate breakfast then blacked out; he cannot remember anything else and cannot provide much history.  Offers no complaints now.  He says he takes glipizide daily and has not been eating very much over the last few days.  He is normally compliant with diet and all meds.  Offers no physical complaints when seen in ED.    In the ED, pt's BG noted to be dropping; fell to 62, was given orange juice.  Improved to 154, 80 this AM. (16 Feb 2018 07:23)  SUBJECTIVE & OBJECTIVE: Pt seen and examined at bedside.  No acute complaints overnight.  Denies dizziness, shortness of breath, chest pain, or other symptom.  Patient had a clinically in significant hospital course.  He had low grade fever overnight although he does not appear toxic and am labs are within normal limits.  Doing well at time of discharge.    PHYSICAL EXAM:  T(C): 36.4 (02-19-18 @ 05:18), Max: 36.6 (02-18-18 @ 17:32)  HR: 70 (02-19-18 @ 05:18) (70 - 82)  BP: 138/37 (02-19-18 @ 05:18) (87/51 - 138/37)  RR: 18 (02-19-18 @ 05:18) (18 - 18)  SpO2: 97% (02-19-18 @ 05:18) (97% - 97%)    GENERAL: NAD, well-groomed, well-developed  HEAD:  Atraumatic, Normocephalic  EYES: EOMI, PERRLA, conjunctiva and sclera clear  ENMT: Moist mucous membranes  NECK: Supple, No JVD  NERVOUS SYSTEM:  Alert & Oriented X3, Motor Strength 5/5 B/L upper and lower extremities; DTRs 2+ intact and symmetric  CHEST/LUNG: Clear to auscultation bilaterally; No rales, rhonchi, wheezing, or rubs  HEART: Regular rate and rhythm; No murmurs, rubs, or gallops  ABDOMEN: Soft, Nontender, Nondistended; Bowel sounds present  EXTREMITIES:  2+ Peripheral Pulses, No clubbing, cyanosis, or edema  LABS:                        10.0   4.84  )-----------( 178      ( 18 Feb 2018 07:11 )             32.8     POCT Blood Glucose.: 227 mg/dL (19 Feb 2018 10:41)  POCT Blood Glucose.: 199 mg/dL (19 Feb 2018 07:37)  POCT Blood Glucose.: 282 mg/dL (18 Feb 2018 22:05)  POCT Blood Glucose.: 268 mg/dL (18 Feb 2018 15:57)  Prostate Ca Screen, PSA Total (02.18.18 @ 13:39)    Prostate Ca Screen, PSA Total: 3716.00: Test Repeated  Serum PSA is not an absolute test for malignancy. The PSA value  should be used in conjunction with information available from  clinical and other diagnostic procedures.  METHOD: Roche EIA ng/mL    RECENT CULTURES:  Culture - Urine (02.16.18 @ 10:12)    Specimen Source: .Urine Clean Catch (Midstream)    Culture Results:   Culture grew 3 or more types of organisms which indicate  collection contamination; consider recollection only if clinically  indicated.    RADIOLOGY & ADDITIONAL TESTS:  < from: CT Cervical Spine No Cont (02.16.18 @ 04:35) >  IMPRESSION: No acute fracture or dislocation of the cervical spine.    Mixed lytic and sclerotic appearance of the bones suggests metastases.   Correlate with patient's clinical history.    < end of copied text >    89 year old man with PMH HTN, DM2, and HLD presents with complaint of low blood sugar and LOC.  Signs, symptoms, and work up consistent with hypoglycemia on glipizide XR.    Problem/Plan - 1:  ·  Problem: Hypoglycemia.  Plan: follow accucheck QA and HS  CHO diet  Diabetic teaching  c/w HISS    Problem/Plan - 2:  ·  Problem: Type 2 diabetes mellitus with hypoglycemia without coma, without long-term current use of insulin.  Plan: Meds on hold for now  D5NS as above  Resume coverage as appropriate.     Problem/Plan - 3:  ·  Problem: Abnormal CT scan, neck.  Plan: Lytic lesion found incidentally   Obtain history from PMD as patient unsure; says he thinks he's been told about abnormal bone lesions in the past.  PSA elevated.  Likely metastatic prostate CA  pending heme/onc followup.   Can be worked up as outpatient if new finding.     Problem/Plan - 4:  ·  Problem: Social problem.  Plan: Patient states that he's been having more trouble caring for himself.  Was to be set up with [facility] "at some point", unclear on progress.  PT and SW consults to ensure safe DC- awaitin g skilled nursing facility placement.     Problem/Plan - 5:  ·  Problem: Primary hypertension.  Plan: Continue home meds.

## 2018-02-20 NOTE — DISCHARGE NOTE ADULT - MEDICATION SUMMARY - MEDICATIONS TO TAKE
I will START or STAY ON the medications listed below when I get home from the hospital:    aspirin 325 mg oral tablet  -- 1 tab(s) by mouth once a day  -- Indication: For Essential Hypertension    quinapril 40 mg oral tablet  -- 1 tab(s) by mouth once a day  -- Indication: For Congestive Heart Failure    simvastatin 10 mg oral tablet  -- 1 tab(s) by mouth once a day (at bedtime)  -- Indication: For Hyperlipidemia    Lasix 20 mg oral tablet  --  by mouth 2 times a day  -- Indication: For Congestive heart Failure I will START or STAY ON the medications listed below when I get home from the hospital:    aspirin 325 mg oral tablet  -- 1 tab(s) by mouth once a day  -- Indication: For Essential Hypertension    quinapril 40 mg oral tablet  -- 1 tab(s) by mouth once a day  -- Indication: For Congestive Heart Failure    metFORMIN 500 mg oral tablet  -- 1 tab(s) by mouth 2 times a day   -- Check with your doctor before becoming pregnant.  Do not drink alcoholic beverages when taking this medication.  It is very important that you take or use this exactly as directed.  Do not skip doses or discontinue unless directed by your doctor.  Obtain medical advice before taking any non-prescription drugs as some may affect the action of this medication.  Take with food or milk.    -- Indication: For Diabetes Mellitus    simvastatin 10 mg oral tablet  -- 1 tab(s) by mouth once a day (at bedtime)  -- Indication: For Hyperlipidemia    Lasix 20 mg oral tablet  --  by mouth 2 times a day  -- Indication: For Congestive heart Failure I will START or STAY ON the medications listed below when I get home from the hospital:    aspirin 325 mg oral tablet  -- 1 tab(s) by mouth once a day  -- Indication: For Essential Hypertension    lisinopril 20 mg oral tablet  -- 1 tab(s) by mouth once a day  -- Indication: For CHF    metFORMIN 500 mg oral tablet  -- 1 tab(s) by mouth 2 times a day   -- Check with your doctor before becoming pregnant.  Do not drink alcoholic beverages when taking this medication.  It is very important that you take or use this exactly as directed.  Do not skip doses or discontinue unless directed by your doctor.  Obtain medical advice before taking any non-prescription drugs as some may affect the action of this medication.  Take with food or milk.    -- Indication: For Diabetes Mellitus    simvastatin 10 mg oral tablet  -- 1 tab(s) by mouth once a day (at bedtime)  -- Indication: For Hyperlipidemia    Lasix 20 mg oral tablet  --  by mouth 2 times a day  -- Indication: For Congestive heart Failure

## 2018-02-20 NOTE — DISCHARGE NOTE ADULT - NSTOBACCOHOTLINE_GEN_A_CS
[facility] Smokers Quitline ([phone]-XM-FZZKF) [facility] Smokers Quitline ([phone]-HV-JQZPY) [facility] Smokers Quitline (205-BG-FFVRQ) [facility] Smokers Quitline ([phone]-PLMPY)

## 2018-02-20 NOTE — DISCHARGE NOTE ADULT - PATIENT PORTAL LINK FT
You can access the [facility] Patient Portal, offered by [facility], by registering with the following website: [email or web address]

## 2018-02-20 NOTE — DISCHARGE NOTE ADULT - CARE PLAN
Principal Discharge DX:	Type 2 diabetes mellitus with hypoglycemia without coma, without long-term current use of insulin  Goal:	discontinue glipizide in this geriatric patient.  goal to keep blood sugar >60  Assessment and plan of treatment:	1.  Follow up with PMD in 1-2 weeks  Secondary Diagnosis:	Essential hypertension  Assessment and plan of treatment:	continue all home medications  Secondary Diagnosis:	Functional quadriplegia  Assessment and plan of treatment:	PT for [facility]

## 2018-02-20 NOTE — DISCHARGE NOTE ADULT - PLAN OF CARE
discontinue glipizide in this geriatric patient.  goal to keep blood sugar >60 1.  Follow up with PMD in 1-2 weeks continue all home medications PT for [facility]

## 2018-02-21 PROCEDURE — 99233 SBSQ HOSP IP/OBS HIGH 50: CPT

## 2018-02-21 RX ORDER — LISINOPRIL 2.5 MG/1
1 TABLET ORAL
Qty: 0 | Refills: 0 | COMMUNITY
Start: 2018-02-21

## 2018-02-21 RX ORDER — METFORMIN HYDROCHLORIDE 850 MG/1
1 TABLET ORAL
Qty: 60 | Refills: 2 | OUTPATIENT
Start: 2018-02-21 | End: 2018-05-21

## 2018-02-21 RX ORDER — LISINOPRIL 2.5 MG/1
20 TABLET ORAL DAILY
Qty: 0 | Refills: 0 | Status: DISCONTINUED | OUTPATIENT
Start: 2018-02-21 | End: 2018-02-22

## 2018-02-21 RX ORDER — INSULIN LISPRO 100/ML
VIAL (ML) SUBCUTANEOUS
Qty: 0 | Refills: 0 | Status: DISCONTINUED | OUTPATIENT
Start: 2018-02-21 | End: 2018-02-22

## 2018-02-21 RX ORDER — INSULIN LISPRO 100/ML
VIAL (ML) SUBCUTANEOUS AT BEDTIME
Qty: 0 | Refills: 0 | Status: DISCONTINUED | OUTPATIENT
Start: 2018-02-21 | End: 2018-02-22

## 2018-02-21 RX ORDER — SODIUM CHLORIDE 9 MG/ML
500 INJECTION INTRAMUSCULAR; INTRAVENOUS; SUBCUTANEOUS ONCE
Qty: 0 | Refills: 0 | Status: COMPLETED | OUTPATIENT
Start: 2018-02-21 | End: 2018-02-21

## 2018-02-21 RX ADMIN — Medication 325 MILLIGRAM(S): at 11:58

## 2018-02-21 RX ADMIN — HEPARIN SODIUM 5000 UNIT(S): 5000 INJECTION INTRAVENOUS; SUBCUTANEOUS at 14:07

## 2018-02-21 RX ADMIN — SIMVASTATIN 10 MILLIGRAM(S): 20 TABLET, FILM COATED ORAL at 22:11

## 2018-02-21 RX ADMIN — Medication 6: at 17:41

## 2018-02-21 RX ADMIN — LISINOPRIL 40 MILLIGRAM(S): 2.5 TABLET ORAL at 06:06

## 2018-02-21 RX ADMIN — HEPARIN SODIUM 5000 UNIT(S): 5000 INJECTION INTRAVENOUS; SUBCUTANEOUS at 06:07

## 2018-02-21 RX ADMIN — Medication 20 MILLIGRAM(S): at 06:06

## 2018-02-21 RX ADMIN — HEPARIN SODIUM 5000 UNIT(S): 5000 INJECTION INTRAVENOUS; SUBCUTANEOUS at 22:11

## 2018-02-21 RX ADMIN — SODIUM CHLORIDE 1000 MILLILITER(S): 9 INJECTION INTRAMUSCULAR; INTRAVENOUS; SUBCUTANEOUS at 17:41

## 2018-02-22 VITALS
OXYGEN SATURATION: 100 % | HEART RATE: 93 BPM | DIASTOLIC BLOOD PRESSURE: 38 MMHG | RESPIRATION RATE: 18 BRPM | SYSTOLIC BLOOD PRESSURE: 84 MMHG | TEMPERATURE: 98 F

## 2018-02-22 DIAGNOSIS — I10 ESSENTIAL (PRIMARY) HYPERTENSION: ICD-10-CM

## 2018-02-22 DIAGNOSIS — R50.9 FEVER, UNSPECIFIED: ICD-10-CM

## 2018-02-22 DIAGNOSIS — R53.2 FUNCTIONAL QUADRIPLEGIA: ICD-10-CM

## 2018-02-22 LAB
ANION GAP SERPL CALC-SCNC: 8 MMOL/L — SIGNIFICANT CHANGE UP (ref 5–17)
BASOPHILS # BLD AUTO: 0.01 K/UL — SIGNIFICANT CHANGE UP (ref 0–0.2)
BASOPHILS NFR BLD AUTO: 0.1 % — SIGNIFICANT CHANGE UP (ref 0–2)
BUN SERPL-MCNC: 27 MG/DL — HIGH (ref 7–23)
CALCIUM SERPL-MCNC: 8.3 MG/DL — LOW (ref 8.5–10.1)
CHLORIDE SERPL-SCNC: 105 MMOL/L — SIGNIFICANT CHANGE UP (ref 96–108)
CO2 SERPL-SCNC: 27 MMOL/L — SIGNIFICANT CHANGE UP (ref 22–31)
CREAT SERPL-MCNC: 1.21 MG/DL — SIGNIFICANT CHANGE UP (ref 0.5–1.3)
EOSINOPHIL # BLD AUTO: 0.03 K/UL — SIGNIFICANT CHANGE UP (ref 0–0.5)
EOSINOPHIL NFR BLD AUTO: 0.3 % — SIGNIFICANT CHANGE UP (ref 0–6)
GLUCOSE SERPL-MCNC: 158 MG/DL — HIGH (ref 70–99)
HCT VFR BLD CALC: 25.3 % — LOW (ref 39–50)
HGB BLD-MCNC: 8.1 G/DL — LOW (ref 13–17)
IMM GRANULOCYTES NFR BLD AUTO: 1.3 % — SIGNIFICANT CHANGE UP (ref 0–1.5)
LYMPHOCYTES # BLD AUTO: 1.18 K/UL — SIGNIFICANT CHANGE UP (ref 1–3.3)
LYMPHOCYTES # BLD AUTO: 12.5 % — LOW (ref 13–44)
MAGNESIUM SERPL-MCNC: 2 MG/DL — SIGNIFICANT CHANGE UP (ref 1.6–2.6)
MCHC RBC-ENTMCNC: 26.2 PG — LOW (ref 27–34)
MCHC RBC-ENTMCNC: 32 GM/DL — SIGNIFICANT CHANGE UP (ref 32–36)
MCV RBC AUTO: 81.9 FL — SIGNIFICANT CHANGE UP (ref 80–100)
MONOCYTES # BLD AUTO: 0.85 K/UL — SIGNIFICANT CHANGE UP (ref 0–0.9)
MONOCYTES NFR BLD AUTO: 9 % — SIGNIFICANT CHANGE UP (ref 2–14)
NEUTROPHILS # BLD AUTO: 7.23 K/UL — SIGNIFICANT CHANGE UP (ref 1.8–7.4)
NEUTROPHILS NFR BLD AUTO: 76.8 % — SIGNIFICANT CHANGE UP (ref 43–77)
PLATELET # BLD AUTO: 243 K/UL — SIGNIFICANT CHANGE UP (ref 150–400)
POTASSIUM SERPL-MCNC: 3.7 MMOL/L — SIGNIFICANT CHANGE UP (ref 3.5–5.3)
POTASSIUM SERPL-SCNC: 3.7 MMOL/L — SIGNIFICANT CHANGE UP (ref 3.5–5.3)
RBC # BLD: 3.09 M/UL — LOW (ref 4.2–5.8)
RBC # FLD: 17.8 % — HIGH (ref 10.3–14.5)
SODIUM SERPL-SCNC: 140 MMOL/L — SIGNIFICANT CHANGE UP (ref 135–145)
WBC # BLD: 9.42 K/UL — SIGNIFICANT CHANGE UP (ref 3.8–10.5)
WBC # FLD AUTO: 9.42 K/UL — SIGNIFICANT CHANGE UP (ref 3.8–10.5)

## 2018-02-22 PROCEDURE — 71045 X-RAY EXAM CHEST 1 VIEW: CPT | Mod: 26

## 2018-02-22 PROCEDURE — 99239 HOSP IP/OBS DSCHRG MGMT >30: CPT

## 2018-02-22 RX ORDER — SODIUM CHLORIDE 9 MG/ML
500 INJECTION INTRAMUSCULAR; INTRAVENOUS; SUBCUTANEOUS ONCE
Qty: 0 | Refills: 0 | Status: COMPLETED | OUTPATIENT
Start: 2018-02-22 | End: 2018-02-22

## 2018-02-22 RX ADMIN — HEPARIN SODIUM 5000 UNIT(S): 5000 INJECTION INTRAVENOUS; SUBCUTANEOUS at 14:43

## 2018-02-22 RX ADMIN — SODIUM CHLORIDE 1000 MILLILITER(S): 9 INJECTION INTRAMUSCULAR; INTRAVENOUS; SUBCUTANEOUS at 06:34

## 2018-02-22 RX ADMIN — HEPARIN SODIUM 5000 UNIT(S): 5000 INJECTION INTRAVENOUS; SUBCUTANEOUS at 06:34

## 2018-02-22 RX ADMIN — Medication 4: at 12:16

## 2018-02-22 RX ADMIN — Medication 325 MILLIGRAM(S): at 12:16

## 2018-02-22 NOTE — PROGRESS NOTE ADULT - PROBLEM SELECTOR PLAN 3
PT and SW consults
Lytic lesion found incidentally   Obtain history from PMD as patient unsure; says he thinks he's been told about abnormal bone lesions in the past.  will send psa study and have oncology see him   Can be worked up as outpatient if new finding.
c/w PT DC to [facility]

## 2018-02-22 NOTE — PROGRESS NOTE ADULT - PROBLEM SELECTOR PROBLEM 2
Type 2 diabetes mellitus with hypoglycemia without coma, without long-term current use of insulin
Type 2 diabetes mellitus with hypoglycemia without coma, without long-term current use of insulin
Essential hypertension

## 2018-02-22 NOTE — PROGRESS NOTE ADULT - PROBLEM SELECTOR PROBLEM 4
Primary hypertension
Social problem
Type 2 diabetes mellitus with hypoglycemia without coma, without long-term current use of insulin

## 2018-02-22 NOTE — PROGRESS NOTE ADULT - SUBJECTIVE AND OBJECTIVE BOX
CHIEF COMPLAINT/INTERVAL HISTORY:    Patient is a 89y old  Male who presents with a chief complaint of hypoglycemia, LOC (16 Feb 2018 07:23)      HPI:  89 year old man with PMH HTN, DM2, and HLD presents with complaint of low blood sugar and LOC.  As per patient he lives at home and was brought in after his family could not get in touch with him.  He states that he ate breakfast then blacked out; he cannot remember anything else and cannot provide much history.  Offers no complaints now.  He says he takes glipizide daily and has not been eating very much over the last few days.  He is normally compliant with diet and all meds.  Offers no physical complaints when seen in ED.    In the ED, pt's BG noted to be dropping; fell to 62, was given orange juice.  Improved to 154, 80 this AM. (16 Feb 2018 07:23)    Overnight issues  patient waiting on skilled nursing facility placeemnt  ct of head noted - will start w/u here pending placement  SUBJECTIVE & OBJECTIVE: Pt seen and examined at bedside.   ROS:  CONSTITUTIONAL: No fever, weight loss, or fatigue  NECK: No pain or stiffness  RESPIRATORY: No cough, wheezing, chills or hemoptysis; No shortness of breath  CARDIOVASCULAR: No chest pain, palpitations, dizziness, or leg swelling  GASTROINTESTINAL: No abdominal or epigastric pain. No nausea, vomiting, or hematemesis; No diarrhea or constipation. No melena or hematochezia.  GENITOURINARY: No dysuria, frequency, hematuria, or incontinence  NEUROLOGICAL: No headaches, memory loss, loss of strength, numbness, or tremors  SKIN: No itching, burning, rashes, or lesions   ICU Vital Signs Last 24 Hrs  T(C): 36.6 (18 Feb 2018 04:56), Max: 36.6 (18 Feb 2018 04:56)  T(F): 97.9 (18 Feb 2018 04:56), Max: 97.9 (18 Feb 2018 04:56)  HR: 76 (18 Feb 2018 04:56) (67 - 76)  BP: 98/67 (18 Feb 2018 04:56) (95/66 - 112/55)  BP(mean): --  ABP: --  ABP(mean): --  RR: 19 (18 Feb 2018 04:56) (18 - 19)  SpO2: 99% (18 Feb 2018 04:56) (99% - 99%)        MEDICATIONS  (STANDING):  aspirin 325 milliGRAM(s) Oral daily  dextrose 5% + sodium chloride 0.9%. 1000 milliLiter(s) (80 mL/Hr) IV Continuous <Continuous>  furosemide    Tablet 20 milliGRAM(s) Oral daily  heparin  Injectable 5000 Unit(s) SubCutaneous every 8 hours  lisinopril 40 milliGRAM(s) Oral daily  simvastatin 10 milliGRAM(s) Oral at bedtime    MEDICATIONS  (PRN):        PHYSICAL EXAM:    GENERAL: NAD, well-groomed, well-developed  HEAD:  Atraumatic, Normocephalic  EYES: EOMI, PERRLA, conjunctiva and sclera clear  ENMT: Moist mucous membranes  NECK: Supple, No JVD  NERVOUS SYSTEM:  Alert & Oriented X3, Motor Strength 5/5 B/L upper and lower extremities; DTRs 2+ intact and symmetric  CHEST/LUNG: Clear to auscultation bilaterally; No rales, rhonchi, wheezing, or rubs  HEART: Regular rate and rhythm; No murmurs, rubs, or gallops  ABDOMEN: Soft, Nontender, Nondistended; Bowel sounds present  EXTREMITIES:  2+ Peripheral Pulses, No clubbing, cyanosis, or edema    LABS:                        10.0   4.84  )-----------( 178      ( 18 Feb 2018 07:11 )             32.8     02-17    139  |  104  |  17  ----------------------------<  122<H>  3.7   |  27  |  1.00    Ca    8.5      17 Feb 2018 07:12            CAPILLARY BLOOD GLUCOSE      POCT Blood Glucose.: 288 mg/dL (18 Feb 2018 10:21)  POCT Blood Glucose.: 236 mg/dL (18 Feb 2018 05:14)  POCT Blood Glucose.: 207 mg/dL (18 Feb 2018 02:05)  POCT Blood Glucose.: 216 mg/dL (17 Feb 2018 22:06)  POCT Blood Glucose.: 316 mg/dL (17 Feb 2018 15:41)  POCT Blood Glucose.: 213 mg/dL (17 Feb 2018 10:55)      RECENT CULTURES:      RADIOLOGY & ADDITIONAL TESTS:  Imaging Personally Reviewed:  [ ] YES      Consultant(s) Notes Reviewed:  [ ] YES     Care Discussed with [ ] Consultants [X ] Patient [ ] Family  [x ]    [x ]  Other; RN  HEALTH ISSUES - PROBLEM Dx:  Preventive measure: Preventive measure  Primary hypertension: Primary hypertension  Social problem: Social problem  Abnormal CT scan, neck: Abnormal CT scan, neck  Type 2 diabetes mellitus with hypoglycemia without coma, without long-term current use of insulin: Type 2 diabetes mellitus with hypoglycemia without coma, without long-term current use of insulin  Hypoglycemia: Hypoglycemia        DVT/GI ppx  Discussed with pt @ bedside
Patient is a 89y old  Male who presents with a chief complaint of hypoglycemia, LOC (2018 07:23)      INTERVAL HPI/OVERNIGHT EVENTS:  Pt  seen   at  bed  side   MEDICATIONS  (STANDING):  aspirin 325 milliGRAM(s) Oral daily  dextrose 5% + sodium chloride 0.9%. 1000 milliLiter(s) (80 mL/Hr) IV Continuous <Continuous>  furosemide    Tablet 20 milliGRAM(s) Oral daily  heparin  Injectable 5000 Unit(s) SubCutaneous every 8 hours  lisinopril 40 milliGRAM(s) Oral daily  simvastatin 10 milliGRAM(s) Oral at bedtime    MEDICATIONS  (PRN):      Allergies    No Known Allergies    Intolerances          Vital Signs Last 24 Hrs  T(C): 36 (2018 05:30), Max: 37 (2018 17:00)  T(F): 96.8 (2018 05:30), Max: 98.6 (2018 17:00)  HR: 64 (2018 05:30) (63 - 72)  BP: 106/62 (2018 05:30) (94/34 - 154/52)  BP(mean): --  RR: 16 (2018 05:30) (16 - 18)  SpO2: 100% (2018 05:30) (97% - 100%)    PHYSICAL EXAM:  pt  is  alert  &  oriented x 3   Heart -S1, S2  +   Lung- B/L  air  entry   ABD- old   healed  midline  lower   abdominal  scar pr , soft , ND, NT , BS+  Ext - No edema   LABS:                        9.0    5.29  )-----------( 246      ( 2018 07:12 )             28.3     -    139  |  104  |  17  ----------------------------<  122<H>  3.7   |  27  |  1.00    Ca    8.5      2018 07:12    TPro  6.8  /  Alb  2.9<L>  /  TBili  0.4  /  DBili  x   /  AST  29  /  ALT  16  /  AlkPhos  414<H>  02-16    PT/INR - ( 2018 03:33 )   PT: 13.4 sec;   INR: 1.22 ratio         PTT - ( 2018 03:33 )  PTT:29.6 sec  Urinalysis Basic - ( 2018 03:33 )    Color: Yellow / Appearance: Clear / S.010 / pH: x  Gluc: x / Ketone: Negative  / Bili: Negative / Urobili: Negative mg/dL   Blood: x / Protein: 30 mg/dL / Nitrite: Negative   Leuk Esterase: Negative / RBC: 3-5 /HPF / WBC 0-2   Sq Epi: x / Non Sq Epi: x / Bacteria: Moderate      CAPILLARY BLOOD GLUCOSE      POCT Blood Glucose.: 139 mg/dL (2018 07:06)  POCT Blood Glucose.: 112 mg/dL (2018 21:40)  POCT Blood Glucose.: 181 mg/dL (2018 19:02)  POCT Blood Glucose.: 186 mg/dL (2018 15:32)  POCT Blood Glucose.: 173 mg/dL (2018 11:02)    Lipid panel:   CARDIAC MARKERS ( 2018 03:33 )  <.015 ng/mL / x     / 220 U/L / x     / 3.1 ng/mL          TSH     RADIOLOGY & ADDITIONAL TESTS:    Imaging Personally Reviewed:  [ ] YES  [ ] NO    Consultant(s) Notes Reviewed:  [ ] YES  [ ] NO    Care Discussed with Consultants/Other Providers [ ] YES  [ ] NO
Patient is a 89y old  Male who presents with a chief complaint of hypoglycemia, LOC (16 Feb 2018 07:23)        HPI:  89 year old man with PMH HTN, DM2, and HLD presents with complaint of low blood sugar and LOC.  As per patient he lives at home and was brought in after his family could not get in touch with him.  He states that he ate breakfast then blacked out; he cannot remember anything else and cannot provide much history.  Offers no complaints now.  He says he takes glipizide daily and has not been eating very much over the last few days.  He is normally compliant with diet and all meds.  Offers no physical complaints when seen in ED.    In the ED, pt's BG noted to be dropping; fell to 62, was given orange juice.  Improved to 154, 80 this AM. (16 Feb 2018 07:23)      SUBJECTIVE & OBJECTIVE: Pt seen and examined at bedside.  No acute complaints overnight.  Denies dizziness, shortness of breath, chest pain, or other symptom.      PHYSICAL EXAM:  T(C): 36.4 (02-19-18 @ 05:18), Max: 36.6 (02-18-18 @ 17:32)  HR: 70 (02-19-18 @ 05:18) (70 - 82)  BP: 138/37 (02-19-18 @ 05:18) (87/51 - 138/37)  RR: 18 (02-19-18 @ 05:18) (18 - 18)  SpO2: 97% (02-19-18 @ 05:18) (97% - 97%)    GENERAL: NAD, well-groomed, well-developed  HEAD:  Atraumatic, Normocephalic  EYES: EOMI, PERRLA, conjunctiva and sclera clear  ENMT: Moist mucous membranes  NECK: Supple, No JVD  NERVOUS SYSTEM:  Alert & Oriented X3, Motor Strength 5/5 B/L upper and lower extremities; DTRs 2+ intact and symmetric  CHEST/LUNG: Clear to auscultation bilaterally; No rales, rhonchi, wheezing, or rubs  HEART: Regular rate and rhythm; No murmurs, rubs, or gallops  ABDOMEN: Soft, Nontender, Nondistended; Bowel sounds present  EXTREMITIES:  2+ Peripheral Pulses, No clubbing, cyanosis, or edema        MEDICATIONS  (STANDING):  aspirin 325 milliGRAM(s) Oral daily  dextrose 5% + sodium chloride 0.9%. 1000 milliLiter(s) (80 mL/Hr) IV Continuous <Continuous>  furosemide    Tablet 20 milliGRAM(s) Oral daily  heparin  Injectable 5000 Unit(s) SubCutaneous every 8 hours  lisinopril 40 milliGRAM(s) Oral daily  simvastatin 10 milliGRAM(s) Oral at bedtime    MEDICATIONS  (PRN):      LABS:                        10.0   4.84  )-----------( 178      ( 18 Feb 2018 07:11 )             32.8     POCT Blood Glucose.: 227 mg/dL (19 Feb 2018 10:41)  POCT Blood Glucose.: 199 mg/dL (19 Feb 2018 07:37)  POCT Blood Glucose.: 282 mg/dL (18 Feb 2018 22:05)  POCT Blood Glucose.: 268 mg/dL (18 Feb 2018 15:57)  Prostate Ca Screen, PSA Total (02.18.18 @ 13:39)    Prostate Ca Screen, PSA Total: 3716.00: Test Repeated  Serum PSA is not an absolute test for malignancy. The PSA value  should be used in conjunction with information available from  clinical and other diagnostic procedures.  METHOD: Roche EIA ng/mL    RECENT CULTURES:  Culture - Urine (02.16.18 @ 10:12)    Specimen Source: .Urine Clean Catch (Midstream)    Culture Results:   Culture grew 3 or more types of organisms which indicate  collection contamination; consider recollection only if clinically  indicated.    RADIOLOGY & ADDITIONAL TESTS:  < from: CT Cervical Spine No Cont (02.16.18 @ 04:35) >  IMPRESSION: No acute fracture or dislocation of the cervical spine.    Mixed lytic and sclerotic appearance of the bones suggests metastases.   Correlate with patient's clinical history.    < end of copied text >      DVT/GI ppx  Discussed with pt @ bedside
Patient is a 89y old  Male who presents with a chief complaint of hypoglycemia, LOC (20 Feb 2018 15:56)        HPI:  89 year old man with PMH HTN, DM2, and HLD presents with complaint of low blood sugar and LOC.  As per patient he lives at home and was brought in after his family could not get in touch with him.  He states that he ate breakfast then blacked out; he cannot remember anything else and cannot provide much history.  Offers no complaints now.  He says he takes glipizide daily and has not been eating very much over the last few days.  He is normally compliant with diet and all meds.  Offers no physical complaints when seen in ED.    In the ED, pt's BG noted to be dropping; fell to 62, was given orange juice.  Improved to 154, 80 this AM. (16 Feb 2018 07:23)      SUBJECTIVE & OBJECTIVE: Pt seen and examined at bedside.  No acute events occurring overnight.  Denies fevers, chills, shortness of breath, or others symptom.      PHYSICAL EXAM:  T(C): 36.6 (02-21-18 @ 12:12), Max: 37.4 (02-21-18 @ 05:33)  HR: 90 (02-21-18 @ 12:12) (87 - 92)  BP: 105/84 (02-21-18 @ 12:12) (103/57 - 108/56)  RR: 15 (02-21-18 @ 12:12) (15 - 16)  SpO2: 97% (02-21-18 @ 12:12) (96% - 100%)    GENERAL: NAD, thin, emaciated  HEAD:  Atraumatic, Normocephalic  EYES: EOMI, PERRLA, conjunctiva and sclera clear  ENMT: Moist mucous membranes  NECK: Supple, No JVD  NERVOUS SYSTEM:  Alert & Oriented X3, Motor Strength 5/5 B/L upper and lower extremities; DTRs 2+ intact and symmetric  CHEST/LUNG: Clear to auscultation bilaterally; No rales, rhonchi, wheezing, or rubs  HEART: Regular rate and rhythm; No murmurs, rubs, or gallops  ABDOMEN: Soft, Nontender, Nondistended; Bowel sounds present  EXTREMITIES:  2+ Peripheral Pulses, No clubbing, cyanosis, or edema    MEDICATIONS  (STANDING):  aspirin 325 milliGRAM(s) Oral daily  dextrose 5% + sodium chloride 0.9%. 1000 milliLiter(s) (80 mL/Hr) IV Continuous <Continuous>  furosemide    Tablet 20 milliGRAM(s) Oral daily  heparin  Injectable 5000 Unit(s) SubCutaneous every 8 hours  lisinopril 40 milliGRAM(s) Oral daily  simvastatin 10 milliGRAM(s) Oral at bedtime    MEDICATIONS  (PRN):      LABS:    02-20    140  |  105  |  20  ----------------------------<  192<H>  3.9   |  25  |  1.25    Ca    8.8      20 Feb 2018 08:16  POCT Blood Glucose.: 267 mg/dL (21 Feb 2018 11:10)  POCT Blood Glucose.: 259 mg/dL (21 Feb 2018 08:12)  POCT Blood Glucose.: 345 mg/dL (20 Feb 2018 21:25)  POCT Blood Glucose.: 327 mg/dL (20 Feb 2018 15:35)    RECENT CULTURES:  Culture - Urine (02.16.18 @ 10:12)    Specimen Source: .Urine Clean Catch (Midstream)    Culture Results:   Culture grew 3 or more types of organisms which indicate  collection contamination; consider recollection only if clinically  indicated.    RADIOLOGY & ADDITIONAL TESTS:    < from: Xray Chest 1 View- PORTABLE-Urgent (02.16.18 @ 02:49) >  IMPRESSION:  No acute airspace disease.     < end of copied text >    DVT/GI ppx  Discussed with pt @ bedside
Patient is a 89y old  Male who presents with a chief complaint of hypoglycemia, LOC (20 Feb 2018 15:56)        HPI:  89 year old man with PMH HTN, DM2, and HLD presents with complaint of low blood sugar and LOC.  As per patient he lives at home and was brought in after his family could not get in touch with him.  He states that he ate breakfast then blacked out; he cannot remember anything else and cannot provide much history.  Offers no complaints now.  He says he takes glipizide daily and has not been eating very much over the last few days.  He is normally compliant with diet and all meds.  Offers no physical complaints when seen in ED.    In the ED, pt's BG noted to be dropping; fell to 62, was given orange juice.  Improved to 154, 80 this AM. (16 Feb 2018 07:23)      SUBJECTIVE & OBJECTIVE: Pt seen and examined at bedside. He had an episode of fever to 100.5F and hypotension yesterday and this morning. States that he is feeling well.  Denies other symptoms.  Denies shortness of breath, cough, dizziness.      PHYSICAL EXAM:  T(C): 37.9 (02-22-18 @ 05:44), Max: 37.9 (02-22-18 @ 05:44)  HR: 82 (02-22-18 @ 05:44) (80 - 100)  BP: 87/38 (02-22-18 @ 05:44) (79/45 - 126/59)  RR: 16 (02-22-18 @ 05:44) (15 - 16)  SpO2: 98% (02-22-18 @ 05:44) (96% - 98%)  Wt(kg): --   I&O's Detail    21 Feb 2018 07:01  -  22 Feb 2018 07:00  --------------------------------------------------------  IN:    Oral Fluid: 800 mL  Total IN: 800 mL    OUT:    Voided: 500 mL  Total OUT: 500 mL    Total NET: 300 mL    GENERAL: NAD, well-groomed, well-developed  HEAD:  Atraumatic, Normocephalic  EYES: EOMI, PERRLA, conjunctiva and sclera clear  ENMT: Moist mucous membranes  NECK: Supple, No JVD  NERVOUS SYSTEM:  Alert & Oriented X3, Motor Strength 5/5 B/L upper and lower extremities; DTRs 2+ intact and symmetric  CHEST/LUNG: Clear to auscultation bilaterally; No rales, rhonchi, wheezing, or rubs  HEART: Regular rate and rhythm; No murmurs, rubs, or gallops  ABDOMEN: Soft, Nontender, Nondistended; Bowel sounds present  EXTREMITIES:  2+ Peripheral Pulses, No clubbing, cyanosis, or edema    MEDICATIONS  (STANDING):  aspirin 325 milliGRAM(s) Oral daily  furosemide    Tablet 20 milliGRAM(s) Oral daily  heparin  Injectable 5000 Unit(s) SubCutaneous every 8 hours  insulin lispro (HumaLOG) corrective regimen sliding scale   SubCutaneous three times a day before meals  insulin lispro (HumaLOG) corrective regimen sliding scale   SubCutaneous at bedtime  lisinopril 20 milliGRAM(s) Oral daily  simvastatin 10 milliGRAM(s) Oral at bedtime    LABS:                        8.1    9.42  )-----------( 243      ( 22 Feb 2018 09:21 )             25.3     CAPILLARY BLOOD GLUCOSE  POCT Blood Glucose.: 148 mg/dL (22 Feb 2018 08:19)  POCT Blood Glucose.: 116 mg/dL (21 Feb 2018 21:20)  POCT Blood Glucose.: 291 mg/dL (21 Feb 2018 16:13)  POCT Blood Glucose.: 267 mg/dL (21 Feb 2018 11:10)    RECENT CULTURES:  none pending    RADIOLOGY & ADDITIONAL TESTS:  pending am CXR    DVT/GI ppx  Discussed with pt @ bedside

## 2018-02-22 NOTE — PROGRESS NOTE ADULT - PROBLEM SELECTOR PLAN 2
Meds on hold for now  D5NS as above  Resume coverage as appropriate
Meds on hold for now  F/U  HBA1C    Resume coverage as appropriate
hold BP meds for now

## 2018-02-22 NOTE — PROGRESS NOTE ADULT - PROBLEM SELECTOR PLAN 4
Continue home meds
HISS  following Accuchecks
Patient states that he's been having more trouble caring for himself.  Was to be set up with [facility] "at some point", unclear on progress.  PT and SW consults to ensure safe DC- awaitin g skilled nursing facility placement

## 2018-02-22 NOTE — PROGRESS NOTE ADULT - ASSESSMENT
89 year old man with PMH HTN, DM2, and HLD presents with complaint of low blood sugar and LOC.  Signs, symptoms, and work up consistent with hypoglycemia on glipizide XR.
89 year old man with PMH HTN, DM2, and HLD presents with complaint of low blood sugar and LOC.  Signs, symptoms, and work up consistent with hypoglycemia on glipizide XR.  Patient will require admission for close blood glucose monitoring for the next 24 hours as detailed below:    IMPROVE VTE Individual Risk Assessment          RISK                                                          Points    [  ] Previous VTE                                                3    [  ] Thrombophilia                                             2    [  ] Lower limb paralysis                                    2        (unable to hold up >15 seconds)      [  ] Current Cancer                                             2         (within 6 months)    [ x ] Immobilization > 24 hrs                              1    [  ] ICU/CCU stay > 24 hours                            1    [  x] Age > 60                                                    1    IMPROVE VTE Score _____2____
89 year old man with PMH HTN, DM2, and HLD presents with complaint of low blood sugar and LOC with an episode of low-grade temperature and low BP
89 year old man with PMH HTN, DM2, and HLD presents with complaint of low blood sugar and LOC.  Signs, symptoms, and work up consistent with hypoglycemia on glipizide XR.    Problem/Plan - 1:  ·  Problem: Hypoglycemia.    Plan: follow accucheck QAC and HS  Adding HISS as FSG have been persistently 200's  Will d/c home on Metformin 500 mg PO BID  CHO diet  Diabetic teaching    Problem/Plan - 2:  ·  Problem: Type 2 diabetes mellitus with hypoglycemia without coma, without long-term current use of insulin.    Plan:  Resume HISS, would d/c glipizide on discharge from hospital and start patient on metformin  Resume coverage as appropriate.     Problem/Plan - 3:  ·  Problem: Abnormal CT scan, neck.    Plan: Lytic lesion found incidentally   Obtain history from PMD as patient unsure; says he thinks he's been told about abnormal bone lesions in the past.  PSA elevated.  Likely metastatic prostate CA  pending heme/onc followup.   Can be worked up as outpatient if new finding.     Problem/Plan - 4:  ·  Problem: Social problem.  Plan: Patient states that he's been having more trouble caring for himself.  Was to be set up with [facility] "at some point", unclear on progress.  PT and SW consults to ensure safe DC- awaitin g skilled nursing facility placement.   D/C planning.
89 year old man with PMH HTN, DM2, and HLD presents with complaint of low blood sugar and LOC.  Signs, symptoms, and work up consistent with hypoglycemia on glipizide XR.    Problem/Plan - 1:  ·  Problem: Hypoglycemia.  Plan: follow accucheck QAC and HS  CHO diet  Diabetic teaching  c/w HISS    Problem/Plan - 2:  ·  Problem: Type 2 diabetes mellitus with hypoglycemia without coma, without long-term current use of insulin.  Plan: Meds on hold for now  D5NS as above  Resume coverage as appropriate.     Problem/Plan - 3:  ·  Problem: Abnormal CT scan, neck.  Plan: Lytic lesion found incidentally   Obtain history from PMD as patient unsure; says he thinks he's been told about abnormal bone lesions in the past.  PSA elevated.  Likely metastatic prostate CA  pending heme/onc followup.   Can be worked up as outpatient if new finding.     Problem/Plan - 4:  ·  Problem: Social problem.  Plan: Patient states that he's been having more trouble caring for himself.  Was to be set up with [facility] "at some point", unclear on progress.  PT and SW consults to ensure safe DC- awaitin g skilled nursing facility placement.     Problem/Plan - 5:  ·  Problem: Primary hypertension.  Plan: Continue home meds.

## 2018-02-22 NOTE — PROGRESS NOTE ADULT - PROBLEM SELECTOR PLAN 1
Monitor FS q1h  CHO diet  Diabetic teaching  D5NS @ 80 for now, discontinue and start sliding scale as appropriate
Resolved
will r/u URI  - following am CXR  - will check cbc and bmp this morning.  patient does not appear infected

## 2018-02-26 DIAGNOSIS — Z79.84 LONG TERM (CURRENT) USE OF ORAL HYPOGLYCEMIC DRUGS: ICD-10-CM

## 2018-02-26 DIAGNOSIS — I50.9 HEART FAILURE, UNSPECIFIED: ICD-10-CM

## 2018-02-26 DIAGNOSIS — C61 MALIGNANT NEOPLASM OF PROSTATE: ICD-10-CM

## 2018-02-26 DIAGNOSIS — Z79.82 LONG TERM (CURRENT) USE OF ASPIRIN: ICD-10-CM

## 2018-02-26 DIAGNOSIS — R53.2 FUNCTIONAL QUADRIPLEGIA: ICD-10-CM

## 2018-02-26 DIAGNOSIS — F03.90 UNSPECIFIED DEMENTIA WITHOUT BEHAVIORAL DISTURBANCE: ICD-10-CM

## 2018-02-26 DIAGNOSIS — R55 SYNCOPE AND COLLAPSE: ICD-10-CM

## 2018-02-26 DIAGNOSIS — G93.41 METABOLIC ENCEPHALOPATHY: ICD-10-CM

## 2018-02-26 DIAGNOSIS — I11.0 HYPERTENSIVE HEART DISEASE WITH HEART FAILURE: ICD-10-CM

## 2018-02-26 DIAGNOSIS — E11.649 TYPE 2 DIABETES MELLITUS WITH HYPOGLYCEMIA WITHOUT COMA: ICD-10-CM

## 2018-02-26 DIAGNOSIS — Z79.52 LONG TERM (CURRENT) USE OF SYSTEMIC STEROIDS: ICD-10-CM

## 2018-02-26 DIAGNOSIS — M89.8X8 OTHER SPECIFIED DISORDERS OF BONE, OTHER SITE: ICD-10-CM

## 2018-02-26 DIAGNOSIS — C79.52 SECONDARY MALIGNANT NEOPLASM OF BONE MARROW: ICD-10-CM

## 2018-02-26 DIAGNOSIS — E78.5 HYPERLIPIDEMIA, UNSPECIFIED: ICD-10-CM

## 2018-02-26 DIAGNOSIS — E44.0 MODERATE PROTEIN-CALORIE MALNUTRITION: ICD-10-CM

## 2018-08-16 ENCOUNTER — INPATIENT (INPATIENT)
Facility: HOSPITAL | Age: 83
LOS: 5 days | Discharge: HOME HEALTH | End: 2018-08-22
Attending: HOSPITALIST | Admitting: HOSPITALIST
Payer: MEDICARE

## 2018-08-16 VITALS
TEMPERATURE: 98 F | RESPIRATION RATE: 16 BRPM | DIASTOLIC BLOOD PRESSURE: 71 MMHG | OXYGEN SATURATION: 96 % | HEART RATE: 81 BPM | SYSTOLIC BLOOD PRESSURE: 121 MMHG

## 2018-08-16 DIAGNOSIS — E16.2 HYPOGLYCEMIA, UNSPECIFIED: ICD-10-CM

## 2018-08-16 LAB
ALBUMIN SERPL ELPH-MCNC: 3.7 G/DL — SIGNIFICANT CHANGE UP (ref 3.3–5)
ALP SERPL-CCNC: 792 U/L — HIGH (ref 40–120)
ALT FLD-CCNC: 7 U/L — SIGNIFICANT CHANGE UP (ref 4–41)
ANISOCYTOSIS BLD QL: SLIGHT — SIGNIFICANT CHANGE UP
APPEARANCE UR: SIGNIFICANT CHANGE UP
AST SERPL-CCNC: 24 U/L — SIGNIFICANT CHANGE UP (ref 4–40)
BACTERIA # UR AUTO: HIGH HPF
BASE EXCESS BLDV CALC-SCNC: 0.4 MMOL/L — SIGNIFICANT CHANGE UP
BASOPHILS # BLD AUTO: 0 K/UL — SIGNIFICANT CHANGE UP (ref 0–0.2)
BASOPHILS NFR BLD AUTO: 0 % — SIGNIFICANT CHANGE UP (ref 0–2)
BILIRUB SERPL-MCNC: 0.3 MG/DL — SIGNIFICANT CHANGE UP (ref 0.2–1.2)
BILIRUB UR-MCNC: NEGATIVE — SIGNIFICANT CHANGE UP
BLD GP AB SCN SERPL QL: NEGATIVE — SIGNIFICANT CHANGE UP
BLOOD GAS VENOUS - CREATININE: 1.11 MG/DL — SIGNIFICANT CHANGE UP (ref 0.5–1.3)
BLOOD UR QL VISUAL: NEGATIVE — SIGNIFICANT CHANGE UP
BUN SERPL-MCNC: 33 MG/DL — HIGH (ref 7–23)
BURR CELLS BLD QL SMEAR: SLIGHT — SIGNIFICANT CHANGE UP
CALCIUM SERPL-MCNC: 9.3 MG/DL — SIGNIFICANT CHANGE UP (ref 8.4–10.5)
CHLORIDE BLDV-SCNC: 103 MMOL/L — SIGNIFICANT CHANGE UP (ref 96–108)
CHLORIDE SERPL-SCNC: 98 MMOL/L — SIGNIFICANT CHANGE UP (ref 98–107)
CO2 SERPL-SCNC: 22 MMOL/L — SIGNIFICANT CHANGE UP (ref 22–31)
COLOR SPEC: SIGNIFICANT CHANGE UP
CORTIS SERPL-MCNC: 22.7 UG/DL — HIGH (ref 2.7–18.4)
CREAT SERPL-MCNC: 1.19 MG/DL — SIGNIFICANT CHANGE UP (ref 0.5–1.3)
DACRYOCYTES BLD QL SMEAR: SLIGHT — SIGNIFICANT CHANGE UP
ELLIPTOCYTES BLD QL SMEAR: SLIGHT — SIGNIFICANT CHANGE UP
EOSINOPHIL # BLD AUTO: 0.01 K/UL — SIGNIFICANT CHANGE UP (ref 0–0.5)
EOSINOPHIL NFR BLD AUTO: 0.2 % — SIGNIFICANT CHANGE UP (ref 0–6)
FERRITIN SERPL-MCNC: 723.2 NG/ML — HIGH (ref 30–400)
FOLATE SERPL-MCNC: 19 NG/ML — SIGNIFICANT CHANGE UP (ref 4.7–20)
GAS PNL BLDV: 135 MMOL/L — LOW (ref 136–146)
GLUCOSE BLDV-MCNC: 56 — LOW (ref 70–99)
GLUCOSE SERPL-MCNC: 57 MG/DL — LOW (ref 70–99)
GLUCOSE UR-MCNC: NEGATIVE — SIGNIFICANT CHANGE UP
HAPTOGLOB SERPL-MCNC: 351 MG/DL — HIGH (ref 34–200)
HBA1C BLD-MCNC: 9.7 % — HIGH (ref 4–5.6)
HCO3 BLDV-SCNC: 24 MMOL/L — SIGNIFICANT CHANGE UP (ref 20–27)
HCT VFR BLD CALC: 22.4 % — LOW (ref 39–50)
HCT VFR BLDV CALC: 21 % — CRITICAL LOW (ref 39–51)
HGB BLD-MCNC: 6.7 G/DL — CRITICAL LOW (ref 13–17)
HGB BLDV-MCNC: 6.7 G/DL — CRITICAL LOW (ref 13–17)
HYPOCHROMIA BLD QL: SIGNIFICANT CHANGE UP
IMM GRANULOCYTES # BLD AUTO: 0.09 # — SIGNIFICANT CHANGE UP
IMM GRANULOCYTES NFR BLD AUTO: 1.5 % — SIGNIFICANT CHANGE UP (ref 0–1.5)
IRON SATN MFR SERPL: 231 UG/DL — SIGNIFICANT CHANGE UP (ref 155–535)
IRON SATN MFR SERPL: 49 UG/DL — SIGNIFICANT CHANGE UP (ref 45–165)
KETONES UR-MCNC: NEGATIVE — SIGNIFICANT CHANGE UP
LACTATE BLDV-MCNC: 2.7 MMOL/L — HIGH (ref 0.5–2)
LDH SERPL L TO P-CCNC: 285 U/L — HIGH (ref 135–225)
LEUKOCYTE ESTERASE UR-ACNC: SIGNIFICANT CHANGE UP
LYMPHOCYTES # BLD AUTO: 1.18 K/UL — SIGNIFICANT CHANGE UP (ref 1–3.3)
LYMPHOCYTES # BLD AUTO: 19.2 % — SIGNIFICANT CHANGE UP (ref 13–44)
MCHC RBC-ENTMCNC: 24.8 PG — LOW (ref 27–34)
MCHC RBC-ENTMCNC: 29.9 % — LOW (ref 32–36)
MCV RBC AUTO: 83 FL — SIGNIFICANT CHANGE UP (ref 80–100)
MICROCYTES BLD QL: SLIGHT — SIGNIFICANT CHANGE UP
MONOCYTES # BLD AUTO: 0.52 K/UL — SIGNIFICANT CHANGE UP (ref 0–0.9)
MONOCYTES NFR BLD AUTO: 8.5 % — SIGNIFICANT CHANGE UP (ref 2–14)
NEUTROPHILS # BLD AUTO: 4.35 K/UL — SIGNIFICANT CHANGE UP (ref 1.8–7.4)
NEUTROPHILS NFR BLD AUTO: 70.6 % — SIGNIFICANT CHANGE UP (ref 43–77)
NITRITE UR-MCNC: POSITIVE — HIGH
NRBC # FLD: 0.03 — SIGNIFICANT CHANGE UP
OVALOCYTES BLD QL SMEAR: SLIGHT — SIGNIFICANT CHANGE UP
PCO2 BLDV: 44 MMHG — SIGNIFICANT CHANGE UP (ref 41–51)
PH BLDV: 7.37 PH — SIGNIFICANT CHANGE UP (ref 7.32–7.43)
PH UR: 6 — SIGNIFICANT CHANGE UP (ref 5–8)
PLATELET # BLD AUTO: 319 K/UL — SIGNIFICANT CHANGE UP (ref 150–400)
PLATELET COUNT - ESTIMATE: NORMAL — SIGNIFICANT CHANGE UP
PMV BLD: 9 FL — SIGNIFICANT CHANGE UP (ref 7–13)
PO2 BLDV: 38 MMHG — SIGNIFICANT CHANGE UP (ref 35–40)
POIKILOCYTOSIS BLD QL AUTO: SLIGHT — SIGNIFICANT CHANGE UP
POLYCHROMASIA BLD QL SMEAR: SIGNIFICANT CHANGE UP
POTASSIUM BLDV-SCNC: 3.9 MMOL/L — SIGNIFICANT CHANGE UP (ref 3.4–4.5)
POTASSIUM SERPL-MCNC: 4.1 MMOL/L — SIGNIFICANT CHANGE UP (ref 3.5–5.3)
POTASSIUM SERPL-SCNC: 4.1 MMOL/L — SIGNIFICANT CHANGE UP (ref 3.5–5.3)
PROT SERPL-MCNC: 6.7 G/DL — SIGNIFICANT CHANGE UP (ref 6–8.3)
PROT UR-MCNC: SIGNIFICANT CHANGE UP
RBC # BLD: 2.7 M/UL — LOW (ref 4.2–5.8)
RBC # FLD: 20 % — HIGH (ref 10.3–14.5)
RBC CASTS # UR COMP ASSIST: NEGATIVE HPF — SIGNIFICANT CHANGE UP
RETICS #: 60 K/UL — SIGNIFICANT CHANGE UP (ref 25–125)
RETICS/RBC NFR: 2.2 % — SIGNIFICANT CHANGE UP (ref 0.5–2.5)
REVIEW TO FOLLOW: YES — SIGNIFICANT CHANGE UP
RH IG SCN BLD-IMP: POSITIVE — SIGNIFICANT CHANGE UP
RH IG SCN BLD-IMP: POSITIVE — SIGNIFICANT CHANGE UP
SAO2 % BLDV: 62.7 % — SIGNIFICANT CHANGE UP (ref 60–85)
SODIUM SERPL-SCNC: 138 MMOL/L — SIGNIFICANT CHANGE UP (ref 135–145)
SP GR SPEC: 1.01 — SIGNIFICANT CHANGE UP (ref 1–1.04)
SQUAMOUS # UR AUTO: SIGNIFICANT CHANGE UP HPF
UIBC SERPL-MCNC: 181.8 UG/DL — SIGNIFICANT CHANGE UP (ref 110–370)
UROBILINOGEN FLD QL: NORMAL — SIGNIFICANT CHANGE UP
VIT B12 SERPL-MCNC: 212 PG/ML — SIGNIFICANT CHANGE UP (ref 200–900)
WBC # BLD: 6.15 K/UL — SIGNIFICANT CHANGE UP (ref 3.8–10.5)
WBC # FLD AUTO: 6.15 K/UL — SIGNIFICANT CHANGE UP (ref 3.8–10.5)
WBC CASTS UR QL COMP ASSIST: NEGATIVE LPF — SIGNIFICANT CHANGE UP
WBC UR QL: HIGH HPF

## 2018-08-16 PROCEDURE — 70450 CT HEAD/BRAIN W/O DYE: CPT | Mod: 26

## 2018-08-16 PROCEDURE — 71045 X-RAY EXAM CHEST 1 VIEW: CPT | Mod: 26

## 2018-08-16 RX ORDER — DEXTROSE 50 % IN WATER 50 %
12.5 SYRINGE (ML) INTRAVENOUS ONCE
Qty: 0 | Refills: 0 | Status: COMPLETED | OUTPATIENT
Start: 2018-08-16 | End: 2018-08-16

## 2018-08-16 RX ORDER — DEXTROSE 50 % IN WATER 50 %
25 SYRINGE (ML) INTRAVENOUS ONCE
Qty: 0 | Refills: 0 | Status: COMPLETED | OUTPATIENT
Start: 2018-08-16 | End: 2018-08-16

## 2018-08-16 RX ORDER — CEFTRIAXONE 500 MG/1
1 INJECTION, POWDER, FOR SOLUTION INTRAMUSCULAR; INTRAVENOUS ONCE
Qty: 0 | Refills: 0 | Status: COMPLETED | OUTPATIENT
Start: 2018-08-16 | End: 2018-08-16

## 2018-08-16 RX ORDER — SODIUM CHLORIDE 9 MG/ML
1000 INJECTION, SOLUTION INTRAVENOUS
Qty: 0 | Refills: 0 | Status: DISCONTINUED | OUTPATIENT
Start: 2018-08-16 | End: 2018-08-16

## 2018-08-16 RX ADMIN — Medication 25 MILLILITER(S): at 23:41

## 2018-08-16 RX ADMIN — Medication 12.5 MILLILITER(S): at 20:04

## 2018-08-16 RX ADMIN — Medication 12.5 MILLILITER(S): at 20:16

## 2018-08-16 NOTE — ED PROVIDER NOTE - OBJECTIVE STATEMENT
89M PMH DM2, HTN, metastatic prostate CA, mild dementia presents with hypoglycemia and hypotension at PCPs office.  Patient states he went to the PCP today due to poor appetite and not eating, has had dysphagia for the past 2 days.  No fevers, chills, nausea, vomiting, diarrhea, dysuria, wounds.  Patient lives alone, has [facility] that he just got recently - aide daily from 11 am - 5pm.  States that he is on insulin, does not know how much he is taking, he states he self-injects insulin.  He is not sure of his other medications, per review he is on metformin 500 BID, had been on glipizide in the past.  He reports dizziness and lightheadedness while at his PCP's office today, now improved.  Per daughter - takes levemir (unsure), metformin, lasix.  Per daughter had a fall last night - unwitnessed fall at home - daughter found him this morning on the floor, no LOC, unsure if he hit his head however his head was near the nightstand of his bed so may have hit his head.  Patient groggy/lethargic today.  FS at 41 at PCP's office.      Recently treated for UTI w/ macrobid.  History obtained from daughter Vira Lee. head close to night table, takes aspirin 81mg daily. 89M PMH DM2, HTN, metastatic prostate CA, mild dementia presents with hypoglycemia and hypotension at PCPs office, and unwitnessed fall at home last night.  Patient states he went to the PCP today due to poor appetite and not eating, has had dysphagia for the past 2 days.  No fevers, chills, nausea, vomiting, diarrhea, dysuria, open wounds wounds.  Patient lives alone, has [facility]HA that he just got recently - aide daily from 11 am - 5pm.  States that he is on insulin, does not know how much he is taking, he states he self-injects insulin.  He is not sure of his other medications, per review he is on metformin 500 BID, had been on glipizide in the past.  He reports dizziness and lightheadedness while at his PCP's office today, now improved.  Per daughter - takes levemir (unsure), metformin, lasix.  Per daughter had a fall last night - unwitnessed fall at home - daughter found him this morning on the floor, denied no LOC, unsure if he hit his head however his head was near the nightstand of his bed so may have hit his head, takes aspirin 81mg daily.  Patient groggy/lethargic today.  FS at 41 at PCP's office.  Of note, was recently treated for UTI w/ macrobid.  History obtained from daughter Vira Lee.

## 2018-08-16 NOTE — ED ADULT TRIAGE NOTE - CHIEF COMPLAINT QUOTE
BG low and went to MD and was found to be hypotensive and hypoglycemic.  MD stated pt has elevated liver enzymes.  PMH prostate Ca.  BP 90/62.  200mL NS, #24g L AC #20g R ACby EMS.  D5W and 3 tubes of oral glucose at MD office given

## 2018-08-16 NOTE — ED PROVIDER NOTE - ATTENDING CONTRIBUTION TO CARE
I performed a face-to-face evaluation of the patient and performed a history and physical examination. I agree with the history and physical examination.    89 M, metastatic prostate cancer, HTN, HL, DM, lives alone, uses insulin. Hypotensive and hypoglycemic (41) at PCP. Unwitnessed fall. Failure-to-thrive. Labs, admit.

## 2018-08-16 NOTE — ED ADULT NURSE NOTE - OBJECTIVE STATEMENT
Received pt to spot 15 A&ox4 reports being sent by PCP but unsure why, as per triage note was found to be hypoglycemic and hypotensive. Pt denies complaints at time of episode, denies complaints currently. Pt noted to be poor historian, just repeats "they told me to come so I came". Appears well, IV intact per EMS, labs sent, VS as documented, will reassess.

## 2018-08-16 NOTE — ED PROVIDER NOTE - NS ED ROS FT
CONSTITUTIONAL: No fever, weight loss, or fatigue  EYES: No eye pain, visual disturbances, or discharge  ENMT: No sinus or throat pain  RESPIRATORY: No cough, wheezing, chills or hemoptysis; No shortness of breath  CARDIOVASCULAR: No chest pain, palpitations, dizziness, or leg swelling  GASTROINTESTINAL: +poor appetite, +dysphagia.  No abdominal or epigastric pain. No nausea, vomiting, or hematemesis; No diarrhea or constipation. No melena or hematochezia.  GENITOURINARY: No dysuria, frequency, hematuria, or incontinence  NEUROLOGICAL: No headaches, loss of strength, numbness, or tremors  SKIN: No itching, burning, rashes, or lesions   MUSCULOSKELETAL: No joint pain or swelling;

## 2018-08-16 NOTE — ED PROVIDER NOTE - CARE PLAN
Principal Discharge DX:	Hypoglycemia  Secondary Diagnosis:	Hypotension  Secondary Diagnosis:	Prostate cancer

## 2018-08-16 NOTE — ED PROVIDER NOTE - MEDICAL DECISION MAKING DETAILS
89M metastatic prostate CA, HTN, DM2 on insulin presents w/ hypotension/hypoglycemia at PCP's office, also with unwitnessed fall at home.  Will r/o infection. 89M metastatic prostate CA, HTN, DM2 on insulin presents w/ hypotension/hypoglycemia at PCP's office, also with unwitnessed fall at home.  hypoglycemia likely 2/2 inappropriate insulin use as patient self-injecting insulin.  Will r/o infection, sulfonylurea screen (was on glipizide in the past).  IVF. CTH given unwitnessed fall.  Will need safe dc plan as patient lives alone and incorrectly administering insulin.

## 2018-08-16 NOTE — ED PROVIDER NOTE - PHYSICAL EXAMINATION
PHYSICAL EXAM:  GENERAL: NAD  HEAD:  Atraumatic, Normocephalic  EYES: EOMI, PERRLA  ENMT: MMM  NECK: Supple, No JVD  NERVOUS SYSTEM: AOX3, motor and sensation grossly intact in b/l UE and b/l LE  PSYCHIATRIC: Appropriate affect and mood  CHEST/LUNG: Clear to auscultation bilaterally; No rales, rhonchi, wheezing, or rubs  HEART: Regular rate and rhythm; No murmurs, rubs, or gallops. 1+ LE edema.  ABDOMEN: Soft, Nontender, Nondistended; Bowel sounds present  EXTREMITIES:  2+ Peripheral Pulses, No clubbing, cyanosis  SKIN: No rashes or lesions

## 2018-08-17 DIAGNOSIS — H40.9 UNSPECIFIED GLAUCOMA: ICD-10-CM

## 2018-08-17 DIAGNOSIS — Z98.890 OTHER SPECIFIED POSTPROCEDURAL STATES: Chronic | ICD-10-CM

## 2018-08-17 DIAGNOSIS — E78.5 HYPERLIPIDEMIA, UNSPECIFIED: ICD-10-CM

## 2018-08-17 DIAGNOSIS — E16.2 HYPOGLYCEMIA, UNSPECIFIED: ICD-10-CM

## 2018-08-17 DIAGNOSIS — I10 ESSENTIAL (PRIMARY) HYPERTENSION: ICD-10-CM

## 2018-08-17 DIAGNOSIS — E11.641 TYPE 2 DIABETES MELLITUS WITH HYPOGLYCEMIA WITH COMA: ICD-10-CM

## 2018-08-17 DIAGNOSIS — C61 MALIGNANT NEOPLASM OF PROSTATE: ICD-10-CM

## 2018-08-17 DIAGNOSIS — D64.9 ANEMIA, UNSPECIFIED: ICD-10-CM

## 2018-08-17 DIAGNOSIS — R82.90 UNSPECIFIED ABNORMAL FINDINGS IN URINE: ICD-10-CM
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
## 2018-08-17 DIAGNOSIS — E11.9 TYPE 2 DIABETES MELLITUS WITHOUT COMPLICATIONS: ICD-10-CM

## 2018-08-17 DIAGNOSIS — R62.7 ADULT FAILURE TO THRIVE: ICD-10-CM

## 2018-08-17 LAB
BUN SERPL-MCNC: 28 MG/DL — HIGH (ref 7–23)
CALCIUM SERPL-MCNC: 9 MG/DL — SIGNIFICANT CHANGE UP (ref 8.4–10.5)
CHLORIDE SERPL-SCNC: 100 MMOL/L — SIGNIFICANT CHANGE UP (ref 98–107)
CHOLEST SERPL-MCNC: 172 MG/DL — SIGNIFICANT CHANGE UP (ref 120–199)
CO2 SERPL-SCNC: 24 MMOL/L — SIGNIFICANT CHANGE UP (ref 22–31)
CREAT SERPL-MCNC: 1.05 MG/DL — SIGNIFICANT CHANGE UP (ref 0.5–1.3)
GGT SERPL-CCNC: 15 U/L — SIGNIFICANT CHANGE UP (ref 8–61)
GLUCOSE SERPL-MCNC: 98 MG/DL — SIGNIFICANT CHANGE UP (ref 70–99)
HCT VFR BLD CALC: 24.8 % — LOW (ref 39–50)
HDLC SERPL-MCNC: 82 MG/DL — HIGH (ref 35–55)
HGB BLD-MCNC: 7.7 G/DL — LOW (ref 13–17)
LIPID PNL WITH DIRECT LDL SERPL: 82 MG/DL — SIGNIFICANT CHANGE UP
MAGNESIUM SERPL-MCNC: 1.6 MG/DL — SIGNIFICANT CHANGE UP (ref 1.6–2.6)
MCHC RBC-ENTMCNC: 25.5 PG — LOW (ref 27–34)
MCHC RBC-ENTMCNC: 31 % — LOW (ref 32–36)
MCV RBC AUTO: 82.1 FL — SIGNIFICANT CHANGE UP (ref 80–100)
NRBC # FLD: 0.03 — SIGNIFICANT CHANGE UP
PHOSPHATE SERPL-MCNC: 2.4 MG/DL — LOW (ref 2.5–4.5)
PLATELET # BLD AUTO: 246 K/UL — SIGNIFICANT CHANGE UP (ref 150–400)
PMV BLD: 8.4 FL — SIGNIFICANT CHANGE UP (ref 7–13)
POTASSIUM SERPL-MCNC: 4 MMOL/L — SIGNIFICANT CHANGE UP (ref 3.5–5.3)
POTASSIUM SERPL-SCNC: 4 MMOL/L — SIGNIFICANT CHANGE UP (ref 3.5–5.3)
RBC # BLD: 3.02 M/UL — LOW (ref 4.2–5.8)
RBC # FLD: 19 % — HIGH (ref 10.3–14.5)
SODIUM SERPL-SCNC: 134 MMOL/L — LOW (ref 135–145)
SPECIMEN SOURCE: SIGNIFICANT CHANGE UP
SPECIMEN SOURCE: SIGNIFICANT CHANGE UP
TRIGL SERPL-MCNC: 51 MG/DL — SIGNIFICANT CHANGE UP (ref 10–149)
WBC # BLD: 6.38 K/UL — SIGNIFICANT CHANGE UP (ref 3.8–10.5)
WBC # FLD AUTO: 6.38 K/UL — SIGNIFICANT CHANGE UP (ref 3.8–10.5)

## 2018-08-17 PROCEDURE — 99223 1ST HOSP IP/OBS HIGH 75: CPT

## 2018-08-17 PROCEDURE — 12345: CPT | Mod: NC

## 2018-08-17 PROCEDURE — 99223 1ST HOSP IP/OBS HIGH 75: CPT | Mod: GC

## 2018-08-17 RX ORDER — SIMVASTATIN 20 MG/1
10 TABLET, FILM COATED ORAL AT BEDTIME
Qty: 0 | Refills: 0 | Status: DISCONTINUED | OUTPATIENT
Start: 2018-08-17 | End: 2018-08-17

## 2018-08-17 RX ORDER — INSULIN LISPRO 100/ML
VIAL (ML) SUBCUTANEOUS AT BEDTIME
Qty: 0 | Refills: 0 | Status: DISCONTINUED | OUTPATIENT
Start: 2018-08-17 | End: 2018-08-18

## 2018-08-17 RX ORDER — GLUCAGON INJECTION, SOLUTION 0.5 MG/.1ML
1 INJECTION, SOLUTION SUBCUTANEOUS ONCE
Qty: 0 | Refills: 0 | Status: DISCONTINUED | OUTPATIENT
Start: 2018-08-17 | End: 2018-08-22

## 2018-08-17 RX ORDER — REPAGLINIDE 1 MG/1
0.5 TABLET ORAL THREE TIMES A DAY
Qty: 0 | Refills: 0 | Status: DISCONTINUED | OUTPATIENT
Start: 2018-08-17 | End: 2018-08-22

## 2018-08-17 RX ORDER — DEXTROSE 50 % IN WATER 50 %
25 SYRINGE (ML) INTRAVENOUS ONCE
Qty: 0 | Refills: 0 | Status: DISCONTINUED | OUTPATIENT
Start: 2018-08-17 | End: 2018-08-22

## 2018-08-17 RX ORDER — LACTOBACILLUS ACIDOPHILUS 100MM CELL
1 CAPSULE ORAL
Qty: 0 | Refills: 0 | Status: DISCONTINUED | OUTPATIENT
Start: 2018-08-17 | End: 2018-08-22

## 2018-08-17 RX ORDER — FERROUS SULFATE 325(65) MG
325 TABLET ORAL DAILY
Qty: 0 | Refills: 0 | Status: DISCONTINUED | OUTPATIENT
Start: 2018-08-17 | End: 2018-08-22

## 2018-08-17 RX ORDER — DEXTROSE 50 % IN WATER 50 %
12.5 SYRINGE (ML) INTRAVENOUS ONCE
Qty: 0 | Refills: 0 | Status: DISCONTINUED | OUTPATIENT
Start: 2018-08-17 | End: 2018-08-22

## 2018-08-17 RX ORDER — INSULIN LISPRO 100/ML
VIAL (ML) SUBCUTANEOUS
Qty: 0 | Refills: 0 | Status: DISCONTINUED | OUTPATIENT
Start: 2018-08-17 | End: 2018-08-18

## 2018-08-17 RX ORDER — CEFTRIAXONE 500 MG/1
1 INJECTION, POWDER, FOR SOLUTION INTRAMUSCULAR; INTRAVENOUS EVERY 24 HOURS
Qty: 0 | Refills: 0 | Status: DISCONTINUED | OUTPATIENT
Start: 2018-08-17 | End: 2018-08-20

## 2018-08-17 RX ORDER — INSULIN DETEMIR 100/ML (3)
20 INSULIN PEN (ML) SUBCUTANEOUS
Qty: 0 | Refills: 0 | COMMUNITY

## 2018-08-17 RX ORDER — MEGESTROL ACETATE 40 MG/ML
20 SUSPENSION ORAL DAILY
Qty: 0 | Refills: 0 | Status: DISCONTINUED | OUTPATIENT
Start: 2018-08-17 | End: 2018-08-17

## 2018-08-17 RX ORDER — DEXTROSE 50 % IN WATER 50 %
15 SYRINGE (ML) INTRAVENOUS ONCE
Qty: 0 | Refills: 0 | Status: DISCONTINUED | OUTPATIENT
Start: 2018-08-17 | End: 2018-08-22

## 2018-08-17 RX ORDER — SODIUM,POTASSIUM PHOSPHATES 278-250MG
1 POWDER IN PACKET (EA) ORAL
Qty: 0 | Refills: 0 | Status: COMPLETED | OUTPATIENT
Start: 2018-08-17 | End: 2018-08-18

## 2018-08-17 RX ORDER — DORZOLAMIDE HYDROCHLORIDE TIMOLOL MALEATE 20; 5 MG/ML; MG/ML
1 SOLUTION/ DROPS OPHTHALMIC
Qty: 0 | Refills: 0 | Status: DISCONTINUED | OUTPATIENT
Start: 2018-08-17 | End: 2018-08-22

## 2018-08-17 RX ORDER — DEXTROSE 50 % IN WATER 50 %
50 SYRINGE (ML) INTRAVENOUS DAILY
Qty: 0 | Refills: 0 | Status: DISCONTINUED | OUTPATIENT
Start: 2018-08-17 | End: 2018-08-18

## 2018-08-17 RX ORDER — SODIUM CHLORIDE 9 MG/ML
1000 INJECTION, SOLUTION INTRAVENOUS
Qty: 0 | Refills: 0 | Status: DISCONTINUED | OUTPATIENT
Start: 2018-08-17 | End: 2018-08-22

## 2018-08-17 RX ORDER — LATANOPROST 0.05 MG/ML
1 SOLUTION/ DROPS OPHTHALMIC; TOPICAL AT BEDTIME
Qty: 0 | Refills: 0 | Status: DISCONTINUED | OUTPATIENT
Start: 2018-08-17 | End: 2018-08-22

## 2018-08-17 RX ADMIN — LATANOPROST 1 DROP(S): 0.05 SOLUTION/ DROPS OPHTHALMIC; TOPICAL at 23:15

## 2018-08-17 RX ADMIN — Medication 325 MILLIGRAM(S): at 13:11

## 2018-08-17 RX ADMIN — CEFTRIAXONE 100 GRAM(S): 500 INJECTION, POWDER, FOR SOLUTION INTRAMUSCULAR; INTRAVENOUS at 18:21

## 2018-08-17 RX ADMIN — DORZOLAMIDE HYDROCHLORIDE TIMOLOL MALEATE 1 DROP(S): 20; 5 SOLUTION/ DROPS OPHTHALMIC at 18:20

## 2018-08-17 RX ADMIN — Medication 1 TABLET(S): at 23:15

## 2018-08-17 RX ADMIN — Medication 4: at 14:19

## 2018-08-17 RX ADMIN — DORZOLAMIDE HYDROCHLORIDE TIMOLOL MALEATE 1 DROP(S): 20; 5 SOLUTION/ DROPS OPHTHALMIC at 09:39

## 2018-08-17 RX ADMIN — Medication 50 MILLILITER(S): at 04:40

## 2018-08-17 RX ADMIN — Medication 1 TABLET(S): at 18:20

## 2018-08-17 NOTE — H&P ADULT - NSHPLABSRESULTS_GEN_ALL_CORE
6.7    6.15  )-----------( 319      ( 16 Aug 2018 18:30 )             22.4           138  |  98  |  33<H>  ----------------------------<  57<L>  4.1   |  22  |  1.19    Ca    9.3      16 Aug 2018 18:30    TPro  6.7  /  Alb  3.7  /  TBili  0.3  /  DBili  x   /  AST  24  /  ALT  7   /  AlkPhos  792<H>                  18:30 - VBG - pH: 7.37  | pCO2: 44    | pO2: 38    | Lactate: 2.7        Urinalysis Basic - ( 16 Aug 2018 20:56 )    Color: LIGHT YELLOW / Appearance: Lt TURBID / S.015 / pH: 6.0  Gluc: NEGATIVE / Ketone: NEGATIVE  / Bili: NEGATIVE / Urobili: NORMAL   Blood: NEGATIVE / Protein: TRACE / Nitrite: POSITIVE   Leuk Esterase: SMALL / RBC: NEGATIVE HPF / WBC 6-10 HPF   Sq Epi: OCC HPF / Non Sq Epi: x / Bacteria: MODERATE HPF        EKG:  not in chart, per ED documentation, sinus in 80s, no ST changes    CXR: increased vascular markings compared to CXR in february    CT HEAD:  < from: CT Head No Cont (18 @ 20:36) >      IMPRESSION: No acute intracranial hemorrhage, mass effect, or shift of   the midline structures.     Similar-appearing microvascular disease and chronic left MCA infarct.    Similar-appearing lucent lesion within the left occipital skull base for   which metastatic disease is suspected.    < end of copied text >

## 2018-08-17 NOTE — SWALLOW BEDSIDE ASSESSMENT ADULT - ASR SWALLOW REFERRAL
Registered Dietitian/Dietitian referral recommended at this time 2/2 patient reported minimal PO intake and would benefit from a caloric count to ensure adequate nutrition/hydration.

## 2018-08-17 NOTE — PROGRESS NOTE ADULT - PROBLEM SELECTOR PLAN 3
- patient with fall- changes story as to whether or not he can remember fall  - may be in setting of hypoglycemia  - will obtain PT eval; CT head negative for acute bleed  - patient not complaining of any pain at this time  - fall risk and fall precautions  - TTE in AM; no events on tele thus far - patient with fall- changes story as to whether or not he can remember fall  - may be in setting of hypoglycemia  - PT recs home with home PT; CT head negative for acute bleed  - fall risk and fall precautions  - TTE in AM; tele reveals ventricular bigeminy

## 2018-08-17 NOTE — CONSULT NOTE ADULT - PROBLEM SELECTOR PROBLEM 2
Uncontrolled type 2 diabetes mellitus with hypoglycemia and coma, with long-term current use of insulin

## 2018-08-17 NOTE — SWALLOW BEDSIDE ASSESSMENT ADULT - ASR SWALLOW ASPIRATION MONITOR
gurgly voice/pneumonia/upper respiratory infection/cough/change of breathing pattern/oral hygiene/position upright (89Y)/fever/throat clearing

## 2018-08-17 NOTE — SWALLOW BEDSIDE ASSESSMENT ADULT - DIET PRIOR TO ADMI
Per patient report, patient tolerated a diet of regular solids with thin liquids prior to hospital admission.

## 2018-08-17 NOTE — H&P ADULT - PROBLEM SELECTOR PLAN 1
- patient presents with FS initially of 94, dropping to 40s three times throughout the course of the night  - unknown how much levemir patient has taken at home; fall may have been secondary to hypoglycemia  - patient at high risk of hypoglycemia given he takes sulfonylurea at home and has dementia, making it unsafe to administer insulin   - will need to change diabetic regimen  - patient may have more episodes of hypoglycemia throughout the night likely in setting of unknown insulin administration  - monitor off insulin - patient presents with FS initially of 94, dropping to 40s three times throughout the course of the night  - unknown how much levemir patient has taken at home; fall may have been secondary to hypoglycemia  - patient at high risk of hypoglycemia given he takes sulfonylurea at home and has dementia, making it unsafe to administer insulin   - will need to change diabetic regimen  - patient may have more episodes of hypoglycemia throughout the night likely in setting of unknown insulin administration  - monitor off insulin  - fall/seizure/aspiration precautions - patient presents with FS initially of 94, dropping to 40s three times throughout the course of the night  - unknown how much levemir patient has taken at home; fall may have been secondary to hypoglycemia  - patient at high risk of hypoglycemia given he takes sulfonylurea at home and has dementia, making it unsafe to administer insulin   - will need to change diabetic regimen  - patient may have more episodes of hypoglycemia throughout the night likely in setting of unknown insulin administration  - monitor off insulin  - fall/seizure/aspiration precautions  - F/S q2 hours for 12 hours  - endocrine consult in AM

## 2018-08-17 NOTE — PROGRESS NOTE ADULT - PROBLEM SELECTOR PLAN 10
- c/w eye drops- latanoprost and dorzolamide    11. Need for ppx measure  - no pharmacologic ppx given anemia- SCDs  - diet, regular  - clarify with PCP in AM why patient is on - unclear (history suggests carotid artery stenosis of 55-65%, unknown if any intervention) vs CT scan showing prior MCA?- confirm med rec in the AM (call [facility]- [id number] in [place])  - will need social work evaluation in AM as it is dangerous for patient to live on his own - c/w eye drops- latanoprost and dorzolamide    11. Need for ppx measure  - no pharmacologic ppx given anemia- SCDs  - diet, diabetic as now hyperglycemia

## 2018-08-17 NOTE — SWALLOW BEDSIDE ASSESSMENT ADULT - COMMENTS
Per chart review, patient is a 89M with PMHx of DM2, HTN, stage IV metastatic prostate cancer, mild dementia, presents to the ED with hypotension and hypoglycemia from his PCP's office, found to be hypoglycemic and anemic.     The patient was seen for an initial bedside swallow evaluation this am, at which time he was alert and cooperative. The patient was received seated upright in bed. Upon questioning, the patient reported globus sensation that reportedly started a few days ago. Additionally, the patient reported he typically eats 1-2 small meals daily as he "doesn't feel hungry". Per chart review, patient is a 89M with PMHx of DM2, HTN, stage IV metastatic prostate cancer, mild dementia, presents to the ED with hypotension and hypoglycemia from his PCP's office, found to be hypoglycemic and anemic.     The patient was seen for an initial bedside swallow evaluation this am, at which time he was alert and cooperative. The patient was received seated upright in bed. Upon questioning, the patient reported globus sensation that reportedly started a few days ago. Additionally, the patient reported he typically eats 1-2 small meals daily as he "doesn't feel hungry".    TelePA (Gerri) contacted and discussed recommendations.

## 2018-08-17 NOTE — PROGRESS NOTE ADULT - PROBLEM SELECTOR PLAN 2
- per outpatient records, CBC from July 27th reveals Hgb  of 7.7/ HCT 23 and plt 299  - hemoglobin on admission- 6.7; will give 1unit PRBC  - iron studies reveal elevated ferritin normal iron and TIBC- likely anemia of chronic disease; b12 and folate WNL  - no evidence of GI bleed at this time- will check FOBT  - will check post-transfusion CBC - per outpatient records, CBC from July 27th reveals Hgb  of 7.7/ HCT 23 and plt 299  - hemoglobin on admission- 6.7; now 7.7 with appropriate response  - iron studies reveal elevated ferritin normal iron and TIBC- likely anemia of chronic disease; b12 and folate WNL  - no evidence of GI bleed at this time  - will trend CBC daily

## 2018-08-17 NOTE — ED ADULT NURSE REASSESSMENT NOTE - NS ED NURSE REASSESS COMMENT FT1
break coverage RN- pt sent to floor with float RN and ED tech- blood transfusion running as ordered, no infiltration noted, no transtrusion reaction noted- Consent and  blood transfusion sheet in chart at time of transport

## 2018-08-17 NOTE — H&P ADULT - PMH
CKD (chronic kidney disease)    Diabetes    Glaucoma    HTN (hypertension)    Hyperlipidemia    Prostate cancer

## 2018-08-17 NOTE — SWALLOW BEDSIDE ASSESSMENT ADULT - PHARYNGEAL PHASE
Delayed pharyngeal swallow/Decreased laryngeal elevation Delayed pharyngeal swallow/Decreased laryngeal elevation/Multiple swallows/Throat clear post oral intake

## 2018-08-17 NOTE — PROGRESS NOTE ADULT - PROBLEM SELECTOR PLAN 1
- patient presents with FS initially of 94, dropping to 40s three times throughout the course of the night  - unknown how much levemir patient has taken at home; fall may have been secondary to hypoglycemia  - patient at high risk of hypoglycemia given he takes sulfonylurea at home and has dementia, making it unsafe to administer insulin   - will need to change diabetic regimen  - patient may have more episodes of hypoglycemia throughout the night likely in setting of unknown insulin administration  - monitor off insulin  - fall/seizure/aspiration precautions  - F/S q2 hours for 12 hours  - endocrine consult in AM - patient presents with FS initially of 94, dropping to 40s three times throughout the course of the night  - unknown how much levemir patient has taken at home; med list requested from PCP's office as [facility] do not fill his meds (though listed in outpt med records)  - patient at high risk of hypoglycemia given he takes sulfonylurea at home and has dementia, making it unsafe to administer insulin   - will need to change diabetic regimen  - Patient now hyperglycemic to the 300's after being off insulin, will start sliding scale coverage and diabetic diet  - fall/seizure/aspiration precautions  - endocrine consult placed

## 2018-08-17 NOTE — SWALLOW BEDSIDE ASSESSMENT ADULT - SWALLOW EVAL: DIAGNOSIS
1. The patient demonstrated a functional oral stage for puree, honey thick, and nectar thick liquid textures marked by adequate acceptance, bolus formation, A-P transport, and oral transit time. 2. The patient demonstrated a mild oral dysphagia for solids marked by prolonged mastication and delayed bolus formation with lingual stasis noted that was cleared with a liquid wash upon clinician prompt. 3. The patient was noted with a mild oral dysphagia for thin liquids marked by adequate acceptance, bolus formation, decreased A-P transport, and delayed oral transit time with suspected posterior loss. 4. The patient demonstrated a mild pharyngeal dysphagia for puree, solids, honey thick, and nectar thick liquids marked by delayed swallow trigger and reduced hyolaryngeal elevation upon digital palpation. No overt s/s of airway penetration noted for puree, solids, honey thick, and nectar thick textures.

## 2018-08-17 NOTE — H&P ADULT - PROBLEM SELECTOR PLAN 2
- per outpatient records, CBC from July 27th reveals Hgb  of 7.7/ HCT 23 and plt 299  - hemoglobin on admission- 6.7; s/p 1unit PRBC  - iron studies reveal elevated ferritin normal iron and TIBC- likely anemia of chronic disease; b12 and folate WNL  - no evidence of GI bleed at this time- will check FOBT  - will check post-transfusion CBC - per outpatient records, CBC from July 27th reveals Hgb  of 7.7/ HCT 23 and plt 299  - hemoglobin on admission- 6.7; will give 1unit PRBC  - iron studies reveal elevated ferritin normal iron and TIBC- likely anemia of chronic disease; b12 and folate WNL  - no evidence of GI bleed at this time- will check FOBT  - will check post-transfusion CBC

## 2018-08-17 NOTE — CONSULT NOTE ADULT - PROBLEM SELECTOR RECOMMENDATION 2
Given that insulin will likely be unsafe for this patient would like to see if he can be managed with oral medication.  Check c-peptide.  Start prandin 0.5mg qac TID  Continue low Humalog correction scales while inpatient.  Will follow. Given that insulin will likely be unsafe for this patient would like to see if he can be managed with oral medication.  Given age, HbA1c goal of around 8%.  Check c-peptide.  Start prandin 0.5mg qac TID  Continue low Humalog correction scales while inpatient.  Will follow.

## 2018-08-17 NOTE — PROGRESS NOTE ADULT - PROBLEM SELECTOR PLAN 8
- hold antihypertensives in setting of anemia  - monitor BP - BP currently acceptable off anti-hypertensives, will monitor for now

## 2018-08-17 NOTE — PHYSICAL THERAPY INITIAL EVALUATION ADULT - PERTINENT HX OF CURRENT PROBLEM, REHAB EVAL
This is a 89M with PMHx of DM2, HTN, stage IV metastatic prostate cancer, mild dementia, admitted as found to be hypoglycemic and anemic and s/p fall.

## 2018-08-17 NOTE — H&P ADULT - ASSESSMENT
89M with PMHx of DM2, HTN, stage IV metastatic prostate cancer, mild dementia, presents to the ED with hypotension and hypoglycemia from his PCP's office, found to be hypoglycemic and anemic.

## 2018-08-17 NOTE — H&P ADULT - PROBLEM SELECTOR PLAN 10
- c/w eye drops- latanoprost and dorzolamide    11. Need for ppx measure  - no pharmacologic ppx given anemia- SCDs  - diet, regular  - clarify with PCP in AM why patient is on - unclear (history suggests carotid artery stenosis of 55-65%, unknown if any intervention)  - will need social work evaluation in AM as it is dangerous for patient to live on his own - c/w eye drops- latanoprost and dorzolamide    11. Need for ppx measure  - no pharmacologic ppx given anemia- SCDs  - diet, regular  - clarify with PCP in AM why patient is on - unclear (history suggests carotid artery stenosis of 55-65%, unknown if any intervention) vs CT scan showing prior MCA?- confirm med rec in the AM (call [facility]- [id number] in [place])  - will need social work evaluation in AM as it is dangerous for patient to live on his own

## 2018-08-17 NOTE — PROGRESS NOTE ADULT - PROBLEM SELECTOR PLAN 9
- hold simvastatin until lipid profile resulted in AM - Lipid profile with LDL of 82, will hold off resumption of statin for now given advanced age and would like to minimize medication burden for the patient

## 2018-08-17 NOTE — PROGRESS NOTE ADULT - ASSESSMENT
89 yo M with 89 yo M with uncontrolled DM2 (A1c=9.6), HTN, stage IV prostate cancer, mild dementia, presents to the ED with hypotension and hypoglycemia from his PCP's office, also noted to have normocytic anemia.

## 2018-08-17 NOTE — H&P ADULT - ATTENDING COMMENTS
89 y/o male HX of DM, HTN, Stage 4 metastatic Prostate CA, Old CVA on ASA, Dementia, admitted to TELE for Hypotension, Hypoglycemia, unwitnessed fall, lethargy, FS 41, Very Poor Historian, lives alone, he takes Meds and Insulin unsupervised, UA +, UTI, + Anemia, Hgb 6.7, Getting PRBC x 1, CT head: Chronic  Left MCA infarct, R/O Metastatic disease to left Occipital Skull Base,       Endo Consult, Fall/aspiration/seizure precaution, FS Q 2 HR x 12 HOURS, ECHO, TELE Monitor, Occult Stool, Confirm Home Meds and Medical History with PCP, Hold ASA/Lasix for now on Eye Drops for Glaucoma, SW Consult, PT consult, Hold BP Meds, Hold ORAL Hypoglycemic agents, Venodyne for DVT Prophylaxis, IV Ceftriaxone, Bacid, F/U CBC, CMP, Urine culture, Speech and swallow Consult, hold Zocor, Vitals Q 4 HR,     Case D/W Pt, HS     Pt was seen by me, Dr. CARMEN Gallardo on 8/17/18.

## 2018-08-17 NOTE — CONSULT NOTE ADULT - SUBJECTIVE AND OBJECTIVE BOX
HPI:  89M with PMHx of DM2, HTN, stage IV metastatic prostate cancer, mild dementia, presents to the ED with hypotension and hypoglycemia from his PCP's office.    Per daughter, patient found down on the floor of his home. Patient had an unwitnessed fall and is unsure if he had loss of consciousness or if he hit his head. Patient was also noted to be groggy and lethargic today. At his outpatient PCP office, he was found to have a FG of 41 and apparent hypotension. Patient reports he is in the hospital because his doctor wanted him to do some more testing. He is aware he went to his PCP office today, and states he recalls being on the floor, but not how he got there. Patient's story is not very coherent and his speech is tangential and a bit slow. Patient lives alone and was recently hired a HHA who comes from 11-5pm. He administers his own medications, and endorses taking insulin (does not know which kind or how much). Per daughter, patient should be on metformin, lasix, aspirin and levemir, and unsure of other medications. Patient recently seen by his PCP on 7/27, and at that time UA showed LE,  nitrities, WBC and moderate bacteria, and was given a course of macrobid for UTI.    Per ED documentation, patient reported he was lightheaded and dizzy this morning, but does not recall that on interview. Patient cannot provide information regarding his medical history. Per daughter, patient is not on any medication or treatment for his prostate cancer, and is in remission.    Patient was admitted in february for syncope thought to be 2/2 to hypoglycemia at [facility].          PAST MEDICAL & SURGICAL HISTORY:  CKD (chronic kidney disease)  Hyperlipidemia  Glaucoma  Prostate cancer  HTN (hypertension)  Diabetes  H/O abdominal surgery      FAMILY HISTORY:  No pertinent family history in first degree relatives      Social History: lives alone    Outpatient Medications: Glipizide 10mg daily, metformin 500 mg BID, Levemir 20? u qhs    MEDICATIONS  (STANDING):  cefTRIAXone   IVPB 1 Gram(s) IV Intermittent every 24 hours  dextrose 5%. 1000 milliLiter(s) (50 mL/Hr) IV Continuous <Continuous>  dextrose 50% Injectable 12.5 Gram(s) IV Push once  dextrose 50% Injectable 25 Gram(s) IV Push once  dextrose 50% Injectable 25 Gram(s) IV Push once  dextrose 50% Injectable 50 milliLiter(s) IV Push daily  dorzolamide 2%/timolol 0.5% Ophthalmic Solution 1 Drop(s) Both EYES two times a day  ferrous    sulfate 325 milliGRAM(s) Oral daily  insulin lispro (HumaLOG) corrective regimen sliding scale   SubCutaneous three times a day before meals  insulin lispro (HumaLOG) corrective regimen sliding scale   SubCutaneous at bedtime  lactobacillus acidophilus 1 Tablet(s) Oral two times a day with meals  latanoprost 0.005% Ophthalmic Solution 1 Drop(s) Both EYES at bedtime  potassium acid phosphate/sodium acid phosphate tablet (K-PHOS No. 2) 1 Tablet(s) Oral four times a day with meals    MEDICATIONS  (PRN):  dextrose 40% Gel 15 Gram(s) Oral once PRN Blood Glucose LESS THAN 70 milliGRAM(s)/deciliter  glucagon  Injectable 1 milliGRAM(s) IntraMuscular once PRN Glucose LESS THAN 70 milligrams/deciliter      Allergies    No Known Allergies    Intolerances      Review of Systems:  Constitutional: No fever  Eyes: No blurry vision  Neuro: No tremors  HEENT: No pain  Cardiovascular: No chest pain, palpitations  Respiratory: No SOB, no cough  GI: No nausea, vomiting, abdominal pain  : No dysuria  Skin: no rash  Psych: no depression  Endocrine: no polyuria, polydipsia  Hem/lymph: no swelling  Osteoporosis: no fractures    ALL OTHER SYSTEMS REVIEWED AND NEGATIVE    UNABLE TO OBTAIN    PHYSICAL EXAM:  VITALS: T(C): 36.4 (08-17-18 @ 15:07)  T(F): 97.6 (08-17-18 @ 15:07), Max: 97.7 (08-17-18 @ 00:30)  HR: 74 (08-17-18 @ 15:07) (72 - 82)  BP: 117/51 (08-17-18 @ 15:07) (116/50 - 134/56)  RR:  (16 - 20)  SpO2:  (100% - 100%)  Wt(kg): --  GENERAL: NAD, well-groomed, well-developed  EYES: No proptosis, no lid lag, anicteric  HEENT:  Atraumatic, Normocephalic, moist mucous membranes  THYROID: Normal size, no palpable nodules  RESPIRATORY: Clear to auscultation bilaterally; No rales, rhonchi, wheezing  CARDIOVASCULAR: Regular rate and rhythm; No murmurs; no peripheral edema  GI: Soft, nontender, non distended, normal bowel sounds  SKIN: Dry, intact, No rashes or lesions  MUSCULOSKELETAL: Full range of motion, normal strength  NEURO: sensation intact, extraocular movements intact, no tremor  PSYCH: Alert and oriented x 3, normal affect, normal mood  CUSHING'S SIGNS: no striae      CAPILLARY BLOOD GLUCOSE      POCT Blood Glucose.: 178 mg/dL (17 Aug 2018 16:00)  POCT Blood Glucose.: 312 mg/dL (17 Aug 2018 13:54)  POCT Blood Glucose.: 304 mg/dL (17 Aug 2018 13:23)  POCT Blood Glucose.: 211 mg/dL (17 Aug 2018 11:09)  POCT Blood Glucose.: 115 mg/dL (17 Aug 2018 09:04)  POCT Blood Glucose.: 100 mg/dL (17 Aug 2018 07:31)  POCT Blood Glucose.: 204 mg/dL (17 Aug 2018 05:03)  POCT Blood Glucose.: 48 mg/dL (17 Aug 2018 03:56)  POCT Blood Glucose.: 134 mg/dL (16 Aug 2018 23:57)  POCT Blood Glucose.: 65 mg/dL (16 Aug 2018 21:15)  POCT Blood Glucose.: 41 mg/dL (16 Aug 2018 19:53)                            7.7    6.38  )-----------( 246      ( 17 Aug 2018 07:05 )             24.8       08-17    134<L>  |  100  |  28<H>  ----------------------------<  98  4.0   |  24  |  1.05    EGFR if : 72  EGFR if non : 62    Ca    9.0      08-17  Mg     1.6     08-17  Phos  2.4     08-17    TPro  6.7  /  Alb  3.7  /  TBili  0.3  /  DBili  x   /  AST  24  /  ALT  7   /  AlkPhos  792<H>  08-16      Thyroid Function Tests:      Hemoglobin A1C, Whole Blood: 9.7 % <H> [4.0 - 5.6] (08-16-18 @ 18:30)      08-17 Chol 172 LDL 82 HDL 82<H> Trig 51    Radiology: HPI:  89M with PMHx of DM2, HTN, stage IV metastatic prostate cancer, mild dementia, presents to the ED with hypotension and hypoglycemia from his PCP's office.    Per daughter, patient found down on the floor of his home. Patient had an unwitnessed fall and is unsure if he had loss of consciousness or if he hit his head. Patient was also noted to be groggy and lethargic today. At his outpatient PCP office, he was found to have a FG of 41 and apparent hypotension. Patient reports he is in the hospital because his doctor wanted him to do some more testing. He is aware he went to his PCP office today, and states he recalls being on the floor, but not how he got there. Patient's story is not very coherent and his speech is tangential and a bit slow. Patient lives alone and was recently hired a HHA who comes from 11-5pm. He administers his own medications, and endorses taking insulin (does not know which kind or how much). Per daughter, patient should be on metformin, lasix, aspirin and levemir, and unsure of other medications. Patient recently seen by his PCP on 7/27, and at that time UA showed LE,  nitrities, WBC and moderate bacteria, and was given a course of macrobid for UTI.    Per ED documentation, patient reported he was lightheaded and dizzy this morning, but does not recall that on interview. Patient cannot provide information regarding his medical history. Per daughter, patient is not on any medication or treatment for his prostate cancer, and is in remission.    Patient was admitted in february for syncope thought to be 2/2 to hypoglycemia at [facility].    Patient is poor historian. He cannot report what DM meds he takes. When asked what insulin dose he takes he seemed unsure and said "3 or 4."      PAST MEDICAL & SURGICAL HISTORY:  CKD (chronic kidney disease)  Hyperlipidemia  Glaucoma  Prostate cancer  HTN (hypertension)  Diabetes  H/O abdominal surgery      FAMILY HISTORY:  No pertinent family history in first degree relatives      Social History: lives alone    Outpatient Medications: Glipizide 10mg daily, metformin 500 mg BID, Levemir 20? u qhs - patient cannot confirm this    MEDICATIONS  (STANDING):  cefTRIAXone   IVPB 1 Gram(s) IV Intermittent every 24 hours  dextrose 5%. 1000 milliLiter(s) (50 mL/Hr) IV Continuous <Continuous>  dextrose 50% Injectable 12.5 Gram(s) IV Push once  dextrose 50% Injectable 25 Gram(s) IV Push once  dextrose 50% Injectable 25 Gram(s) IV Push once  dextrose 50% Injectable 50 milliLiter(s) IV Push daily  dorzolamide 2%/timolol 0.5% Ophthalmic Solution 1 Drop(s) Both EYES two times a day  ferrous    sulfate 325 milliGRAM(s) Oral daily  insulin lispro (HumaLOG) corrective regimen sliding scale   SubCutaneous three times a day before meals  insulin lispro (HumaLOG) corrective regimen sliding scale   SubCutaneous at bedtime  lactobacillus acidophilus 1 Tablet(s) Oral two times a day with meals  latanoprost 0.005% Ophthalmic Solution 1 Drop(s) Both EYES at bedtime  potassium acid phosphate/sodium acid phosphate tablet (K-PHOS No. 2) 1 Tablet(s) Oral four times a day with meals    MEDICATIONS  (PRN):  dextrose 40% Gel 15 Gram(s) Oral once PRN Blood Glucose LESS THAN 70 milliGRAM(s)/deciliter  glucagon  Injectable 1 milliGRAM(s) IntraMuscular once PRN Glucose LESS THAN 70 milligrams/deciliter      Allergies    No Known Allergies    Intolerances      Review of Systems:  Constitutional: No fever  Eyes: No blurry vision  Neuro: No tremors  HEENT: No pain  Cardiovascular: No chest pain, palpitations  Respiratory: No SOB, no cough  GI: No nausea, vomiting, abdominal pain  : No dysuria  Skin: no rash  Psych: no depression  Endocrine: no polyuria, polydipsia  Hem/lymph: no swelling  Osteoporosis: no fractures    ALL OTHER SYSTEMS REVIEWED AND NEGATIVE    PHYSICAL EXAM:  VITALS: T(C): 36.4 (08-17-18 @ 15:07)  T(F): 97.6 (08-17-18 @ 15:07), Max: 97.7 (08-17-18 @ 00:30)  HR: 74 (08-17-18 @ 15:07) (72 - 82)  BP: 117/51 (08-17-18 @ 15:07) (116/50 - 134/56)  RR:  (16 - 20)  SpO2:  (100% - 100%)  Wt(kg): --  GENERAL: NAD, well-groomed, well-developed  EYES: No proptosis, no lid lag, anicteric  HEENT:  Atraumatic, Normocephalic, moist mucous membranes  THYROID: Normal size, no palpable nodules  RESPIRATORY: Clear to auscultation bilaterally; No rales, rhonchi, wheezing  CARDIOVASCULAR: Regular rate and rhythm; No murmurs; no peripheral edema  GI: Soft, nontender, non distended, normal bowel sounds  SKIN: Dry, intact, No rashes or lesions  MUSCULOSKELETAL: Full range of motion, normal strength  NEURO: sensation intact, extraocular movements intact, no tremor  PSYCH: Alert pleasant, normal mood  CUSHING'S SIGNS: no striae      CAPILLARY BLOOD GLUCOSE      POCT Blood Glucose.: 178 mg/dL (17 Aug 2018 16:00)  POCT Blood Glucose.: 312 mg/dL (17 Aug 2018 13:54)  POCT Blood Glucose.: 304 mg/dL (17 Aug 2018 13:23)  POCT Blood Glucose.: 211 mg/dL (17 Aug 2018 11:09)  POCT Blood Glucose.: 115 mg/dL (17 Aug 2018 09:04)  POCT Blood Glucose.: 100 mg/dL (17 Aug 2018 07:31)  POCT Blood Glucose.: 204 mg/dL (17 Aug 2018 05:03)  POCT Blood Glucose.: 48 mg/dL (17 Aug 2018 03:56)  POCT Blood Glucose.: 134 mg/dL (16 Aug 2018 23:57)  POCT Blood Glucose.: 65 mg/dL (16 Aug 2018 21:15)  POCT Blood Glucose.: 41 mg/dL (16 Aug 2018 19:53)                            7.7    6.38  )-----------( 246      ( 17 Aug 2018 07:05 )             24.8       08-17    134<L>  |  100  |  28<H>  ----------------------------<  98  4.0   |  24  |  1.05    EGFR if : 72  EGFR if non : 62    Ca    9.0      08-17  Mg     1.6     08-17  Phos  2.4     08-17    TPro  6.7  /  Alb  3.7  /  TBili  0.3  /  DBili  x   /  AST  24  /  ALT  7   /  AlkPhos  792<H>  08-16      Thyroid Function Tests:      Hemoglobin A1C, Whole Blood: 9.7 % <H> [4.0 - 5.6] (08-16-18 @ 18:30)      08-17 Chol 172 LDL 82 HDL 82<H> Trig 51    Radiology:

## 2018-08-17 NOTE — H&P ADULT - PROBLEM SELECTOR PLAN 4
- per [facility] documentation- patient currently on glipizide, metformin and levemir (20 or 25 units, unclear)  - A1c= 9.4  - given high risk of hypoglycemia, patient's medications should be optimized  - will obtain endocrine evaluation; will d/c sulfonylurea and likely insulin

## 2018-08-17 NOTE — PROGRESS NOTE ADULT - PROBLEM SELECTOR PLAN 6
- patient with positive UA +LE, + Nitrite and + bacteria  - improved from UA from outpatient, however still grossly abnormal  - unclear if patient finished outpatient UTI treatment with macrobid  - given fall and dementia, and inability to elucidate encephalopathy at this time, will treat with ceftriaxone  - follow up urine cultures - patient with positive UA +LE, + Nitrite and + bacteria  - improved from UA from outpatient, however still grossly abnormal  - given fall and dementia, and inability to elucidate encephalopathy at this time, will treat with ceftriaxone  - follow up urine cultures

## 2018-08-17 NOTE — H&P ADULT - PROBLEM SELECTOR PLAN 5
- low appetite per family and patient cachetic appearing  - likely in setting of age and stage IV cancer  - started on megestrol as an outpatient presumably as appetite stimulant- will continue for now  - nutrition consult in AM - low appetite per family and patient cachetic appearing  - likely in setting of age and stage IV cancer  - started on megestrol as an outpatient presumably as appetite stimulant- will hold for now  - nutrition consult in AM

## 2018-08-17 NOTE — H&P ADULT - PROBLEM SELECTOR PLAN 3
- patient with fall- changes story as to whether or not he can remember fall  - may be in setting of hypoglycemia  - will obtain PT eval; CT head negative for acute bleed  - patient not complaining of any pain at this time  - fall risk and fall precautions - patient with fall- changes story as to whether or not he can remember fall  - may be in setting of hypoglycemia  - will obtain PT eval; CT head negative for acute bleed  - patient not complaining of any pain at this time  - fall risk and fall precautions  - TTE in AM; no events on tele thus far

## 2018-08-17 NOTE — PROGRESS NOTE ADULT - SUBJECTIVE AND OBJECTIVE BOX
Patient is a 89y old  Male who presents with a chief complaint of hypotension, hypoglycemia and fall (17 Aug 2018 02:46)      SUBJECTIVE / OVERNIGHT EVENTS: Patient seen and examined. Denies CP, SOB, palpitations, dizziness. He reports he felt dizzy prior to falling at home. He had one admission in  to [facility] for same. At present, he is feeling well and is focused on not being able to turn off the TV. Had multiple episodes of hypoglycemia overnight and now hyperglycemic.     MEDICATIONS  (STANDING):  dextrose 5%. 1000 milliLiter(s) (50 mL/Hr) IV Continuous <Continuous>  dextrose 50% Injectable 12.5 Gram(s) IV Push once  dextrose 50% Injectable 25 Gram(s) IV Push once  dextrose 50% Injectable 25 Gram(s) IV Push once  dextrose 50% Injectable 50 milliLiter(s) IV Push daily  dorzolamide 2%/timolol 0.5% Ophthalmic Solution 1 Drop(s) Both EYES two times a day  ferrous    sulfate 325 milliGRAM(s) Oral daily  insulin lispro (HumaLOG) corrective regimen sliding scale   SubCutaneous three times a day before meals  insulin lispro (HumaLOG) corrective regimen sliding scale   SubCutaneous at bedtime  lactobacillus acidophilus 1 Tablet(s) Oral two times a day with meals  latanoprost 0.005% Ophthalmic Solution 1 Drop(s) Both EYES at bedtime    MEDICATIONS  (PRN):  dextrose 40% Gel 15 Gram(s) Oral once PRN Blood Glucose LESS THAN 70 milliGRAM(s)/deciliter  glucagon  Injectable 1 milliGRAM(s) IntraMuscular once PRN Glucose LESS THAN 70 milligrams/deciliter      Vital Signs Last 24 Hrs  T(C): 36.5 (18 @ 00:30)  T(F): 97.7 (18 @ 00:30), Max: 97.7 (18 @ 00:30)  HR: 75 (18 @ 00:30) (72 - 82)  BP: 116/50 (18 @ 00:30)  BP(mean): --  RR: 17 (18 @ 00:30) (16 - 20)  SpO2: 100% (18 @ 00:30) (96% - 100%)  Wt(kg): --    CAPILLARY BLOOD GLUCOSE      POCT Blood Glucose.: 312 mg/dL (17 Aug 2018 13:54)  POCT Blood Glucose.: 304 mg/dL (17 Aug 2018 13:23)  POCT Blood Glucose.: 211 mg/dL (17 Aug 2018 11:09)  POCT Blood Glucose.: 115 mg/dL (17 Aug 2018 09:04)  POCT Blood Glucose.: 100 mg/dL (17 Aug 2018 07:31)  POCT Blood Glucose.: 204 mg/dL (17 Aug 2018 05:03)  POCT Blood Glucose.: 48 mg/dL (17 Aug 2018 03:56)  POCT Blood Glucose.: 134 mg/dL (16 Aug 2018 23:57)  POCT Blood Glucose.: 65 mg/dL (16 Aug 2018 21:15)  POCT Blood Glucose.: 41 mg/dL (16 Aug 2018 19:53)  POCT Blood Glucose.: 94 mg/dL (16 Aug 2018 17:46)    I&O's Summary      PHYSICAL EXAM:  GENERAL: NAD, well-developed  HEAD:  Atraumatic, Normocephalic  EYES: EOMI, PERRLA, conjunctiva and sclera clear  NECK: Supple, No JVD  CHEST/LUNG: Clear to auscultation bilaterally; No wheeze  HEART: Regular rate and rhythm; No murmurs, rubs, or gallops  ABDOMEN: Soft, Nontender, Nondistended; Bowel sounds present  EXTREMITIES:  2+ Peripheral Pulses, No clubbing, cyanosis, or edema  PSYCH: AAOx3  NEUROLOGY: non-focal  SKIN: No rashes or lesions    LABS:                        7.7    6.38  )-----------( 246      ( 17 Aug 2018 07:05 )             24.8         134<L>  |  100  |  28<H>  ----------------------------<  98  4.0   |  24  |  1.05    Ca    9.0      17 Aug 2018 07:05  Phos  2.4       Mg     1.6         TPro  6.7  /  Alb  3.7  /  TBili  0.3  /  DBili  x   /  AST  24  /  ALT  7   /  AlkPhos  792<H>  -          Urinalysis Basic - ( 16 Aug 2018 20:56 )    Color: LIGHT YELLOW / Appearance: Lt TURBID / S.015 / pH: 6.0  Gluc: NEGATIVE / Ketone: NEGATIVE  / Bili: NEGATIVE / Urobili: NORMAL   Blood: NEGATIVE / Protein: TRACE / Nitrite: POSITIVE   Leuk Esterase: SMALL / RBC: NEGATIVE HPF / WBC 6-10 HPF   Sq Epi: OCC HPF / Non Sq Epi: x / Bacteria: MODERATE HPF        RADIOLOGY & ADDITIONAL TESTS:    Imaging Personally Reviewed:    Consultant(s) Notes Reviewed:      Care Discussed with Consultants/Other Providers:    Assessment and Plan: Patient is a 89y old  Male who presents with a chief complaint of hypotension, hypoglycemia and fall (17 Aug 2018 02:46)      SUBJECTIVE / OVERNIGHT EVENTS: Patient seen and examined. Denies CP, SOB, palpitations, dizziness. He reports he felt dizzy prior to falling at home. He had one admission in  to [facility] for same. At present, he is feeling well and is focused on not being able to turn off the TV. Had multiple episodes of hypoglycemia overnight and now hyperglycemic.     MEDICATIONS  (STANDING):  dextrose 5%. 1000 milliLiter(s) (50 mL/Hr) IV Continuous <Continuous>  dextrose 50% Injectable 12.5 Gram(s) IV Push once  dextrose 50% Injectable 25 Gram(s) IV Push once  dextrose 50% Injectable 25 Gram(s) IV Push once  dextrose 50% Injectable 50 milliLiter(s) IV Push daily  dorzolamide 2%/timolol 0.5% Ophthalmic Solution 1 Drop(s) Both EYES two times a day  ferrous    sulfate 325 milliGRAM(s) Oral daily  insulin lispro (HumaLOG) corrective regimen sliding scale   SubCutaneous three times a day before meals  insulin lispro (HumaLOG) corrective regimen sliding scale   SubCutaneous at bedtime  lactobacillus acidophilus 1 Tablet(s) Oral two times a day with meals  latanoprost 0.005% Ophthalmic Solution 1 Drop(s) Both EYES at bedtime    MEDICATIONS  (PRN):  dextrose 40% Gel 15 Gram(s) Oral once PRN Blood Glucose LESS THAN 70 milliGRAM(s)/deciliter  glucagon  Injectable 1 milliGRAM(s) IntraMuscular once PRN Glucose LESS THAN 70 milligrams/deciliter      Vital Signs Last 24 Hrs  T(C): 36.5 (18 @ 00:30)  T(F): 97.7 (18 @ 00:30), Max: 97.7 (18 @ 00:30)  HR: 75 (18 @ 00:30) (72 - 82)  BP: 116/50 (18 @ 00:30)  BP(mean): --  RR: 17 (18 @ 00:30) (16 - 20)  SpO2: 100% (18 @ 00:30) (96% - 100%)  Wt(kg): --    CAPILLARY BLOOD GLUCOSE      POCT Blood Glucose.: 312 mg/dL (17 Aug 2018 13:54)  POCT Blood Glucose.: 304 mg/dL (17 Aug 2018 13:23)  POCT Blood Glucose.: 211 mg/dL (17 Aug 2018 11:09)  POCT Blood Glucose.: 115 mg/dL (17 Aug 2018 09:04)  POCT Blood Glucose.: 100 mg/dL (17 Aug 2018 07:31)  POCT Blood Glucose.: 204 mg/dL (17 Aug 2018 05:03)  POCT Blood Glucose.: 48 mg/dL (17 Aug 2018 03:56)  POCT Blood Glucose.: 134 mg/dL (16 Aug 2018 23:57)  POCT Blood Glucose.: 65 mg/dL (16 Aug 2018 21:15)  POCT Blood Glucose.: 41 mg/dL (16 Aug 2018 19:53)  POCT Blood Glucose.: 94 mg/dL (16 Aug 2018 17:46)    I&O's Summary      PHYSICAL EXAM:  GENERAL: NAD, cachectic, in chair  HEAD:  Atraumatic, Normocephalic  EYES: conjunctiva and sclera clear  NECK: Supple, No JVD  CHEST/LUNG: Clear to auscultation bilaterally;   HEART: Regular rate and rhythm; No murmurs,   ABDOMEN: Soft, Nontender, Nondistended; Bowel sounds present  EXTREMITIES:  2+ Peripheral Pulses, No clubbing, cyanosis, or edema  PSYCH: AAOx3, calm, cooperative  NEUROLOGY: non-focal  SKIN: No rashes or lesions    LABS:                        7.7    6.38  )-----------( 246      ( 17 Aug 2018 07:05 )             24.8     08-    134<L>  |  100  |  28<H>  ----------------------------<  98  4.0   |  24  |  1.05    Ca    9.0      17 Aug 2018 07:05  Phos  2.4       Mg     1.6         Hemoglobin A1C, Whole Blood: 9.7      TPro  6.7  /  Alb  3.7  /  TBili  0.3  /  DBili  x   /  AST  24  /  ALT  7   /  AlkPhos  792<H>  08-16    Urinalysis Basic - ( 16 Aug 2018 20:56 )    Color: LIGHT YELLOW / Appearance: Lt TURBID / S.015 / pH: 6.0  Gluc: NEGATIVE / Ketone: NEGATIVE  / Bili: NEGATIVE / Urobili: NORMAL   Blood: NEGATIVE / Protein: TRACE / Nitrite: POSITIVE   Leuk Esterase: SMALL / RBC: NEGATIVE HPF / WBC 6-10 HPF   Sq Epi: OCC HPF / Non Sq Epi: x / Bacteria: MODERATE HPF    RADIOLOGY & ADDITIONAL TESTS:    Imaging Personally Reviewed:    Consultant(s) Notes Reviewed:      Care Discussed with Consultants/Other Providers: Endocrine - c/s placed for hypoglycemia    Assessment and Plan: Patient is a 89y old  Male who presents with a chief complaint of hypotension, hypoglycemia and fall (17 Aug 2018 02:46)      SUBJECTIVE / OVERNIGHT EVENTS: Patient seen and examined. Denies CP, SOB, palpitations, dizziness. He reports he felt dizzy prior to falling at home. He had one admission in  to [facility] for same. At present, he is feeling well and is focused on not being able to turn off the TV. Had multiple episodes of hypoglycemia overnight and now hyperglycemic.     MEDICATIONS  (STANDING):  dextrose 5%. 1000 milliLiter(s) (50 mL/Hr) IV Continuous <Continuous>  dextrose 50% Injectable 12.5 Gram(s) IV Push once  dextrose 50% Injectable 25 Gram(s) IV Push once  dextrose 50% Injectable 25 Gram(s) IV Push once  dextrose 50% Injectable 50 milliLiter(s) IV Push daily  dorzolamide 2%/timolol 0.5% Ophthalmic Solution 1 Drop(s) Both EYES two times a day  ferrous    sulfate 325 milliGRAM(s) Oral daily  insulin lispro (HumaLOG) corrective regimen sliding scale   SubCutaneous three times a day before meals  insulin lispro (HumaLOG) corrective regimen sliding scale   SubCutaneous at bedtime  lactobacillus acidophilus 1 Tablet(s) Oral two times a day with meals  latanoprost 0.005% Ophthalmic Solution 1 Drop(s) Both EYES at bedtime    MEDICATIONS  (PRN):  dextrose 40% Gel 15 Gram(s) Oral once PRN Blood Glucose LESS THAN 70 milliGRAM(s)/deciliter  glucagon  Injectable 1 milliGRAM(s) IntraMuscular once PRN Glucose LESS THAN 70 milligrams/deciliter      Vital Signs Last 24 Hrs  T(C): 36.5 (18 @ 00:30)  T(F): 97.7 (18 @ 00:30), Max: 97.7 (18 @ 00:30)  HR: 75 (18 @ 00:30) (72 - 82)  BP: 116/50 (18 @ 00:30)  BP(mean): --  RR: 17 (18 @ 00:30) (16 - 20)  SpO2: 100% (18 @ 00:30) (96% - 100%)  Wt(kg): --    CAPILLARY BLOOD GLUCOSE      POCT Blood Glucose.: 312 mg/dL (17 Aug 2018 13:54)  POCT Blood Glucose.: 304 mg/dL (17 Aug 2018 13:23)  POCT Blood Glucose.: 211 mg/dL (17 Aug 2018 11:09)  POCT Blood Glucose.: 115 mg/dL (17 Aug 2018 09:04)  POCT Blood Glucose.: 100 mg/dL (17 Aug 2018 07:31)  POCT Blood Glucose.: 204 mg/dL (17 Aug 2018 05:03)  POCT Blood Glucose.: 48 mg/dL (17 Aug 2018 03:56)  POCT Blood Glucose.: 134 mg/dL (16 Aug 2018 23:57)  POCT Blood Glucose.: 65 mg/dL (16 Aug 2018 21:15)  POCT Blood Glucose.: 41 mg/dL (16 Aug 2018 19:53)  POCT Blood Glucose.: 94 mg/dL (16 Aug 2018 17:46)    I&O's Summary      PHYSICAL EXAM:  GENERAL: NAD, cachectic, in chair  HEAD:  Atraumatic, Normocephalic  EYES: conjunctiva and sclera clear  NECK: Supple, No JVD  CHEST/LUNG: Clear to auscultation bilaterally;   HEART: Regular rate and rhythm; No murmurs,   ABDOMEN: Soft, Nontender, Nondistended; Bowel sounds present  EXTREMITIES:  2+ Peripheral Pulses, No clubbing, cyanosis, or edema  PSYCH: AAOx3, calm, cooperative  NEUROLOGY: non-focal  SKIN: No rashes or lesions    LABS:                        7.7    6.38  )-----------( 246      ( 17 Aug 2018 07:05 )             24.8     Mean Cell Volume: 82.1 fL (.18 @ 07:05)        134<L>  |  100  |  28<H>  ----------------------------<  98  4.0   |  24  |  1.05    Ca    9.0      17 Aug 2018 07:05  Phos  2.4       Mg     1.6         Hemoglobin A1C, Whole Blood: 9.7      TPro  6.7  /  Alb  3.7  /  TBili  0.3  /  DBili  x   /  AST  24  /  ALT  7   /  AlkPhos  792<H>      Urinalysis Basic - ( 16 Aug 2018 20:56 )    Color: LIGHT YELLOW / Appearance: Lt TURBID / S.015 / pH: 6.0  Gluc: NEGATIVE / Ketone: NEGATIVE  / Bili: NEGATIVE / Urobili: NORMAL   Blood: NEGATIVE / Protein: TRACE / Nitrite: POSITIVE   Leuk Esterase: SMALL / RBC: NEGATIVE HPF / WBC 6-10 HPF   Sq Epi: OCC HPF / Non Sq Epi: x / Bacteria: MODERATE HPF    RADIOLOGY & ADDITIONAL TESTS:    Imaging Personally Reviewed:    Consultant(s) Notes Reviewed:      Care Discussed with Consultants/Other Providers: Endocrine - c/s placed for hypoglycemia    Assessment and Plan: Patient is a 89y old  Male who presents with a chief complaint of hypotension, hypoglycemia and fall (17 Aug 2018 02:46)      SUBJECTIVE / OVERNIGHT EVENTS: Patient seen and examined. Denies CP, SOB, palpitations, dizziness. He reports he felt dizzy prior to falling at home. He had one admission in  to [facility] for same. At present, he is feeling well and is focused on not being able to turn off the TV. Had multiple episodes of hypoglycemia overnight and now hyperglycemic.     MEDICATIONS  (STANDING):  dextrose 5%. 1000 milliLiter(s) (50 mL/Hr) IV Continuous <Continuous>  dextrose 50% Injectable 12.5 Gram(s) IV Push once  dextrose 50% Injectable 25 Gram(s) IV Push once  dextrose 50% Injectable 25 Gram(s) IV Push once  dextrose 50% Injectable 50 milliLiter(s) IV Push daily  dorzolamide 2%/timolol 0.5% Ophthalmic Solution 1 Drop(s) Both EYES two times a day  ferrous    sulfate 325 milliGRAM(s) Oral daily  insulin lispro (HumaLOG) corrective regimen sliding scale   SubCutaneous three times a day before meals  insulin lispro (HumaLOG) corrective regimen sliding scale   SubCutaneous at bedtime  lactobacillus acidophilus 1 Tablet(s) Oral two times a day with meals  latanoprost 0.005% Ophthalmic Solution 1 Drop(s) Both EYES at bedtime    MEDICATIONS  (PRN):  dextrose 40% Gel 15 Gram(s) Oral once PRN Blood Glucose LESS THAN 70 milliGRAM(s)/deciliter  glucagon  Injectable 1 milliGRAM(s) IntraMuscular once PRN Glucose LESS THAN 70 milligrams/deciliter      Vital Signs Last 24 Hrs  T(C): 36.5 (18 @ 00:30)  T(F): 97.7 (18 @ 00:30), Max: 97.7 (18 @ 00:30)  HR: 75 (18 @ 00:30) (72 - 82)  BP: 116/50 (18 @ 00:30)  BP(mean): --  RR: 17 (18 @ 00:30) (16 - 20)  SpO2: 100% (18 @ 00:30) (96% - 100%)  Wt(kg): --    CAPILLARY BLOOD GLUCOSE      POCT Blood Glucose.: 312 mg/dL (17 Aug 2018 13:54)  POCT Blood Glucose.: 304 mg/dL (17 Aug 2018 13:23)  POCT Blood Glucose.: 211 mg/dL (17 Aug 2018 11:09)  POCT Blood Glucose.: 115 mg/dL (17 Aug 2018 09:04)  POCT Blood Glucose.: 100 mg/dL (17 Aug 2018 07:31)  POCT Blood Glucose.: 204 mg/dL (17 Aug 2018 05:03)  POCT Blood Glucose.: 48 mg/dL (17 Aug 2018 03:56)  POCT Blood Glucose.: 134 mg/dL (16 Aug 2018 23:57)  POCT Blood Glucose.: 65 mg/dL (16 Aug 2018 21:15)  POCT Blood Glucose.: 41 mg/dL (16 Aug 2018 19:53)  POCT Blood Glucose.: 94 mg/dL (16 Aug 2018 17:46)    I&O's Summary      PHYSICAL EXAM:  GENERAL: NAD, cachectic, in chair  HEAD:  Atraumatic, Normocephalic  EYES: conjunctiva and sclera clear  NECK: Supple, No JVD  CHEST/LUNG: Clear to auscultation bilaterally;   HEART: Regular rate and rhythm; No murmurs,   ABDOMEN: Soft, Nontender, Nondistended; Bowel sounds present  EXTREMITIES:  2+ Peripheral Pulses, No clubbing, cyanosis, or edema  PSYCH: AAOx3, calm, cooperative  NEUROLOGY: non-focal  SKIN: No rashes or lesions    LABS:                        7.7    6.38  )-----------( 246      ( 17 Aug 2018 07:05 )             24.8     Mean Cell Volume: 82.1 fL (18 @ 07:05)        134<L>  |  100  |  28<H>  ----------------------------<  98  4.0   |  24  |  1.05    Ca    9.0      17 Aug 2018 07:05  Phos  2.4       Mg     1.6         Hemoglobin A1C, Whole Blood: 9.7    Lipid Profile (18 @ 07:05)    Cholesterol, Serum: 172 mg/dL    Triglycerides, Serum: 51 mg/dL    HDL Cholesterol, Serum: 82 mg/dL    Direct LDL: 82:   RESULT (mg/dL)   (For adults 18 years and older)  ----------------------------------------------------------  Below 70         Ideal for people at very high risk of  heart disease  Below 100        Ideal for people at risk of heart disease  100 - 129        [place]  130 - 159        Borderline high  160 - 189        High  190 and above    Very high mg/dL        TPro  6.7  /  Alb  3.7  /  TBili  0.3  /  DBili  x   /  AST  24  /  ALT  7   /  AlkPhos  792<H>      Urinalysis Basic - ( 16 Aug 2018 20:56 )    Color: LIGHT YELLOW / Appearance: Lt TURBID / S.015 / pH: 6.0  Gluc: NEGATIVE / Ketone: NEGATIVE  / Bili: NEGATIVE / Urobili: NORMAL   Blood: NEGATIVE / Protein: TRACE / Nitrite: POSITIVE   Leuk Esterase: SMALL / RBC: NEGATIVE HPF / WBC 6-10 HPF   Sq Epi: OCC HPF / Non Sq Epi: x / Bacteria: MODERATE HPF    RADIOLOGY & ADDITIONAL TESTS:    Imaging Personally Reviewed:    Consultant(s) Notes Reviewed:      Care Discussed with Consultants/Other Providers: Endocrine - c/s placed for hypoglycemia    Assessment and Plan:

## 2018-08-17 NOTE — H&P ADULT - NSHPSOCIALHISTORY_GEN_ALL_CORE
Non smoker (smoked a few cigarettes in college)  Drinks etoh 1x/week  Lives alone, - states he is independent of ADLs

## 2018-08-17 NOTE — H&P ADULT - PROBLEM SELECTOR PLAN 6
- patient with positive UA +LE, + Nitrite and + bacteria  - improved from UA from outpatient, however still grossly abnormal  - unclear if patient finished outpatient UTI treatment with macrobid  - given fall and dementia, and inability to elucidate encephalopathy at this time, will treat with ceftriaxone  - follow up urine cultures

## 2018-08-17 NOTE — H&P ADULT - PROBLEM SELECTOR PLAN 7
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

## 2018-08-17 NOTE — H&P ADULT - HISTORY OF PRESENT ILLNESS
89M with PMHx of DM2, HTN, stage IV metastatic prostate cancer, mild dementia, presents to the ED with hypotension and hypoglycemia from his PCP's office.    Patient found down by daughter this morning, on the floor of his home. Patient had an unwittnessed fall and is unsure if he had loss of consciousness or if he hit his head. Patient was laso noted to be groggy and lethargic today. At his outpatient PCP office, he was found to have a FG of 41  Of note, patient has had prior episode February for syncope, thought to be secondary to hypoglycemia.    Patient lives alone and was recently hired a HHA who comes from 11-5pm. He administers his own medications, and endroses taking insulin (does not know which kind or how much), although daughter believes he should not be on insulin.      · HPI Objective Statement: 89M PMH DM2, HTN, metastatic prostate CA, mild dementia presents with hypoglycemia and hypotension at PCPs office, and unwitnessed fall at home last night.  Patient states he went to the PCP today due to poor appetite and not eating, has had dysphagia for the past 2 days.  No fevers, chills, nausea, vomiting, diarrhea, dysuria, open wounds wounds.  Patient lives alone, has HHA that he just got recently - aide daily from 11 am - 5pm.  States that he is on insulin, does not know how much he is taking, he states he self-injects insulin.  He is not sure of his other medications, per review he is on metformin 500 BID, had been on glipizide in the past.  He reports dizziness and lightheadedness while at his PCP's office today, now improved.  Per daughter - takes levemir (unsure), metformin, lasix.  Per daughter had a fall last night - unwitnessed fall at home - daughter found him this morning on the floor, denied no LOC, unsure if he hit his head however his head was near the nightstand of his bed so may have hit his head, takes aspirin 81mg daily.  Patient groggy/lethargic today.  FS at 41 at PCP's office.  Of note, was recently treated for UTI w/ macrobid.  History obtained from daughter Vira Lee. 89M with PMHx of DM2, HTN, stage IV metastatic prostate cancer, mild dementia, presents to the ED with hypotension and hypoglycemia from his PCP's office.    Per daughter, patient found down on the floor of his home. Patient had an unwitnessed fall and is unsure if he had loss of consciousness or if he hit his head. Patient was also noted to be groggy and lethargic today. At his outpatient PCP office, he was found to have a FG of 41 and apparent hypotension. Patient reports he is in the hospital because his doctor wanted him to do some more testing. He is aware he went to his PCP office today, and states he recalls being on the floor, but not how he got there. Patient's story is not very coherent and his speech is tangential and a bit slow. Patient lives alone and was recently hired a HHA who comes from 11-5pm. He administers his own medications, and endorses taking insulin (does not know which kind or how much). Per daughter, patient should be on metformin, lasix, aspirin and levemir, and unsure of other medications. Patient recently seen by his PCP on 7/27, and at that time UA showed LE,  nitrities, WBC and moderate bacteria, and was given a course of macrobid for UTI.    Per ED documentation, patient reported he was lightheaded and dizzy this morning, but does not recall that on interview. Patient cannot provide information regarding his medical history. Per daughter, patient is not on any medication or treatment for his prostate cancer, and is in remission.    Patient admitted in february for syncope thought to be 2/2 to hypoglycemia.    In the ED, patients /71; HR 81; T 97.5; RR 16- 96% on room air. 89M with PMHx of DM2, HTN, stage IV metastatic prostate cancer, mild dementia, presents to the ED with hypotension and hypoglycemia from his PCP's office.    Per daughter, patient found down on the floor of his home. Patient had an unwitnessed fall and is unsure if he had loss of consciousness or if he hit his head. Patient was also noted to be groggy and lethargic today. At his outpatient PCP office, he was found to have a FG of 41 and apparent hypotension. Patient reports he is in the hospital because his doctor wanted him to do some more testing. He is aware he went to his PCP office today, and states he recalls being on the floor, but not how he got there. Patient's story is not very coherent and his speech is tangential and a bit slow. Patient lives alone and was recently hired a HHA who comes from 11-5pm. He administers his own medications, and endorses taking insulin (does not know which kind or how much). Per daughter, patient should be on metformin, lasix, aspirin and levemir, and unsure of other medications. Patient recently seen by his PCP on 7/27, and at that time UA showed LE,  nitrities, WBC and moderate bacteria, and was given a course of macrobid for UTI.    Per ED documentation, patient reported he was lightheaded and dizzy this morning, but does not recall that on interview. Patient cannot provide information regarding his medical history. Per daughter, patient is not on any medication or treatment for his prostate cancer, and is in remission.    Patient admitted in february for syncope thought to be 2/2 to hypoglycemia.    Family HX: Noncontributory to Current pt's medical condition,     In the ED, patients /71; HR 81; T 97.5; RR 16- 96% on room air.

## 2018-08-17 NOTE — SWALLOW BEDSIDE ASSESSMENT ADULT - SWALLOW EVAL: PROGNOSIS
CLINICAL IMPRESSIONS CONTINUED: 5. The patient demonstrated a mild-moderate pharyngeal dysphagia for thin liquids marked by delayed swallow trigger, reduced hyolaryngeal elevation upon digital palpation with subsequent multiple swallows. The patient was noted to throat clear on a thin liquid trial however was not replicated across all trials. This is indicative of airway penetration.

## 2018-08-17 NOTE — SWALLOW BEDSIDE ASSESSMENT ADULT - SWALLOW EVAL: RECOMMENDED FEEDING/EATING TECHNIQUES
maintain upright posture during/after eating for 30 mins/no straws/oral hygiene/crush medication (when feasible)/small sips/bites/check mouth frequently for oral residue/pocketing/position upright (90 degrees)/alternate food with liquid

## 2018-08-17 NOTE — SWALLOW BEDSIDE ASSESSMENT ADULT - ORAL PHASE
Within functional limits Lingual stasis/Lingual stasis noted and cleared with a liquid wash upon clinician prompt./Decreased anterior-posterior movement of the bolus/Delayed oral transit time Delayed oral transit time/Suspected posterior loss/Decreased anterior-posterior movement of the bolus

## 2018-08-17 NOTE — H&P ADULT - NSHPPHYSICALEXAM_GEN_ALL_CORE
General: cachetic male, NAD  Neurology: A&O to self, can recall birth month/day but not year,   Head:  Normocephalic, atraumatic  ENT:  Mucosa moist, no ulcerations  Lymphatic:  No palpable cervical, supraclavicular, axillary or inguinal adenopathy  Respiratory: grossly clear to auscultation b/l  CV: RRR, S1/S2, 3/6 systolic murmurs  Abdominal: Soft, NT, ND, no palpable mass  MSK: No edema, + peripheral pulses General: cachetic male, NAD, malodorous  Neurology: A&O to self, can recall birth month/day but not year,   Head:  Normocephalic, atraumatic  ENT:  Mucosa moist, no ulcerations  Lymphatic:  No palpable cervical, supraclavicular, axillary or inguinal adenopathy  Respiratory: grossly clear to auscultation b/l  CV: RRR, S1/S2, 3/6 systolic murmurs  Abdominal: Soft, NT, ND, no palpable mass  MSK: No edema, + peripheral pulses

## 2018-08-18 LAB
BACTERIA UR CULT: SIGNIFICANT CHANGE UP
BUN SERPL-MCNC: 26 MG/DL — HIGH (ref 7–23)
C PEPTIDE SERPL-MCNC: 0.8 NG/ML — LOW (ref 0.9–7.1)
CALCIUM SERPL-MCNC: 8.7 MG/DL — SIGNIFICANT CHANGE UP (ref 8.4–10.5)
CHLORIDE SERPL-SCNC: 100 MMOL/L — SIGNIFICANT CHANGE UP (ref 98–107)
CO2 SERPL-SCNC: 23 MMOL/L — SIGNIFICANT CHANGE UP (ref 22–31)
CREAT SERPL-MCNC: 1 MG/DL — SIGNIFICANT CHANGE UP (ref 0.5–1.3)
GLUCOSE SERPL-MCNC: 109 MG/DL — HIGH (ref 70–99)
HCT VFR BLD CALC: 24.6 % — LOW (ref 39–50)
HGB BLD-MCNC: 7.4 G/DL — LOW (ref 13–17)
MAGNESIUM SERPL-MCNC: 1.7 MG/DL — SIGNIFICANT CHANGE UP (ref 1.6–2.6)
MCHC RBC-ENTMCNC: 24.7 PG — LOW (ref 27–34)
MCHC RBC-ENTMCNC: 30.1 % — LOW (ref 32–36)
MCV RBC AUTO: 82.3 FL — SIGNIFICANT CHANGE UP (ref 80–100)
NRBC # FLD: 0 — SIGNIFICANT CHANGE UP
PLATELET # BLD AUTO: 245 K/UL — SIGNIFICANT CHANGE UP (ref 150–400)
PMV BLD: 8.6 FL — SIGNIFICANT CHANGE UP (ref 7–13)
POTASSIUM SERPL-MCNC: 4 MMOL/L — SIGNIFICANT CHANGE UP (ref 3.5–5.3)
POTASSIUM SERPL-SCNC: 4 MMOL/L — SIGNIFICANT CHANGE UP (ref 3.5–5.3)
RBC # BLD: 2.99 M/UL — LOW (ref 4.2–5.8)
RBC # FLD: 19.3 % — HIGH (ref 10.3–14.5)
SODIUM SERPL-SCNC: 138 MMOL/L — SIGNIFICANT CHANGE UP (ref 135–145)
SPECIMEN SOURCE: SIGNIFICANT CHANGE UP
WBC # BLD: 4.78 K/UL — SIGNIFICANT CHANGE UP (ref 3.8–10.5)
WBC # FLD AUTO: 4.78 K/UL — SIGNIFICANT CHANGE UP (ref 3.8–10.5)

## 2018-08-18 PROCEDURE — 99232 SBSQ HOSP IP/OBS MODERATE 35: CPT

## 2018-08-18 RX ORDER — INSULIN LISPRO 100/ML
VIAL (ML) SUBCUTANEOUS AT BEDTIME
Qty: 0 | Refills: 0 | Status: DISCONTINUED | OUTPATIENT
Start: 2018-08-18 | End: 2018-08-22

## 2018-08-18 RX ORDER — INSULIN LISPRO 100/ML
VIAL (ML) SUBCUTANEOUS
Qty: 0 | Refills: 0 | Status: DISCONTINUED | OUTPATIENT
Start: 2018-08-18 | End: 2018-08-22

## 2018-08-18 RX ORDER — SODIUM CHLORIDE 9 MG/ML
250 INJECTION INTRAMUSCULAR; INTRAVENOUS; SUBCUTANEOUS ONCE
Qty: 0 | Refills: 0 | Status: COMPLETED | OUTPATIENT
Start: 2018-08-18 | End: 2018-08-18

## 2018-08-18 RX ADMIN — CEFTRIAXONE 100 GRAM(S): 500 INJECTION, POWDER, FOR SOLUTION INTRAMUSCULAR; INTRAVENOUS at 18:17

## 2018-08-18 RX ADMIN — DORZOLAMIDE HYDROCHLORIDE TIMOLOL MALEATE 1 DROP(S): 20; 5 SOLUTION/ DROPS OPHTHALMIC at 18:10

## 2018-08-18 RX ADMIN — Medication 325 MILLIGRAM(S): at 12:48

## 2018-08-18 RX ADMIN — Medication 1 TABLET(S): at 08:58

## 2018-08-18 RX ADMIN — Medication 1: at 12:48

## 2018-08-18 RX ADMIN — REPAGLINIDE 0.5 MILLIGRAM(S): 1 TABLET ORAL at 08:58

## 2018-08-18 RX ADMIN — REPAGLINIDE 0.5 MILLIGRAM(S): 1 TABLET ORAL at 12:48

## 2018-08-18 RX ADMIN — Medication 1 TABLET(S): at 12:48

## 2018-08-18 RX ADMIN — LATANOPROST 1 DROP(S): 0.05 SOLUTION/ DROPS OPHTHALMIC; TOPICAL at 22:24

## 2018-08-18 RX ADMIN — REPAGLINIDE 0.5 MILLIGRAM(S): 1 TABLET ORAL at 18:10

## 2018-08-18 RX ADMIN — DORZOLAMIDE HYDROCHLORIDE TIMOLOL MALEATE 1 DROP(S): 20; 5 SOLUTION/ DROPS OPHTHALMIC at 05:07

## 2018-08-18 RX ADMIN — Medication 1 TABLET(S): at 18:10

## 2018-08-18 RX ADMIN — Medication 1 TABLET(S): at 12:47

## 2018-08-18 RX ADMIN — SODIUM CHLORIDE 250 MILLILITER(S): 9 INJECTION INTRAMUSCULAR; INTRAVENOUS; SUBCUTANEOUS at 12:58

## 2018-08-18 NOTE — PROGRESS NOTE ADULT - PROBLEM SELECTOR PLAN 2
- Likely secondary chronic inflammation given elevated ferritin, continue to trend daily and transfuse PRN Hgb <7 or <8 if symptomatic

## 2018-08-18 NOTE — PROGRESS NOTE ADULT - PROBLEM SELECTOR PLAN 6
- patient with positive UA +LE, + Nitrite and + bacteria  - improved from UA from outpatient, however still grossly abnormal  - given fall and dementia, and inability to elucidate encephalopathy at this time, will treat with ceftriaxone  - follow up urine cultures

## 2018-08-18 NOTE — PROGRESS NOTE ADULT - ASSESSMENT
89 yo M with uncontrolled DM2 (A1c=9.6), HTN, stage IV prostate cancer, mild dementia, presents to the ED with hypotension and hypoglycemia from his PCP's office, also noted to have normocytic anemia.

## 2018-08-18 NOTE — PROGRESS NOTE ADULT - PROBLEM SELECTOR PLAN 9
- Lipid profile with LDL of 82, will hold off resumption of statin for now given advanced age and would like to minimize medication burden for the patient

## 2018-08-18 NOTE — PROGRESS NOTE ADULT - PROBLEM SELECTOR PLAN 10
- c/w eye drops- latanoprost and dorzolamide    11. Need for ppx measure  - no pharmacologic ppx given anemia- SCDs  - diet, diabetic as now hyperglycemia

## 2018-08-18 NOTE — PROGRESS NOTE ADULT - PROBLEM SELECTOR PLAN 3
- patient with fall- changes story as to whether or not he can remember fall  - may be in setting of hypoglycemia  - PT recs home with home PT; CT head negative for acute bleed  - fall risk and fall precautions  - TTE in AM; tele reveals ventricular bigeminy

## 2018-08-18 NOTE — PROGRESS NOTE ADULT - SUBJECTIVE AND OBJECTIVE BOX
Patient is a 89y old  Male who presents with a chief complaint of hypotension, hypoglycemia and fall (17 Aug 2018 02:46)    SUBJECTIVE / OVERNIGHT EVENTS: Patient seen and examined. Denies CP, SOB, palpitations, dizziness. Denies further falls or near falls, states he feels well.     MEDICATIONS  (STANDING):  dextrose 5%. 1000 milliLiter(s) (50 mL/Hr) IV Continuous <Continuous>  dextrose 50% Injectable 12.5 Gram(s) IV Push once  dextrose 50% Injectable 25 Gram(s) IV Push once  dextrose 50% Injectable 25 Gram(s) IV Push once  dextrose 50% Injectable 50 milliLiter(s) IV Push daily  dorzolamide 2%/timolol 0.5% Ophthalmic Solution 1 Drop(s) Both EYES two times a day  ferrous    sulfate 325 milliGRAM(s) Oral daily  insulin lispro (HumaLOG) corrective regimen sliding scale   SubCutaneous three times a day before meals  insulin lispro (HumaLOG) corrective regimen sliding scale   SubCutaneous at bedtime  lactobacillus acidophilus 1 Tablet(s) Oral two times a day with meals  latanoprost 0.005% Ophthalmic Solution 1 Drop(s) Both EYES at bedtime    MEDICATIONS  (PRN):  dextrose 40% Gel 15 Gram(s) Oral once PRN Blood Glucose LESS THAN 70 milliGRAM(s)/deciliter  glucagon  Injectable 1 milliGRAM(s) IntraMuscular once PRN Glucose LESS THAN 70 milligrams/deciliter      Vital Signs Last 24 Hrs  T(C): 36.5 (18 @ 00:30)  T(F): 97.7 (18 @ 00:30), Max: 97.7 (18 @ 00:30)  HR: 75 (18 @ 00:30) (72 - 82)  BP: 116/50 (18 @ 00:30)  BP(mean): --  RR: 17 (18 @ 00:30) (16 - 20)  SpO2: 100% (18 @ 00:30) (96% - 100%)  Wt(kg): --    CAPILLARY BLOOD GLUCOSE      POCT Blood Glucose.: 312 mg/dL (17 Aug 2018 13:54)  POCT Blood Glucose.: 304 mg/dL (17 Aug 2018 13:23)  POCT Blood Glucose.: 211 mg/dL (17 Aug 2018 11:09)  POCT Blood Glucose.: 115 mg/dL (17 Aug 2018 09:04)  POCT Blood Glucose.: 100 mg/dL (17 Aug 2018 07:31)  POCT Blood Glucose.: 204 mg/dL (17 Aug 2018 05:03)  POCT Blood Glucose.: 48 mg/dL (17 Aug 2018 03:56)  POCT Blood Glucose.: 134 mg/dL (16 Aug 2018 23:57)  POCT Blood Glucose.: 65 mg/dL (16 Aug 2018 21:15)  POCT Blood Glucose.: 41 mg/dL (16 Aug 2018 19:53)  POCT Blood Glucose.: 94 mg/dL (16 Aug 2018 17:46)    I&O's Summary    PHYSICAL EXAM:  GENERAL: NAD, cachectic, in chair  HEAD:  Atraumatic, Normocephalic  EYES: conjunctiva and sclera clear  NECK: Supple, No JVD  CHEST/LUNG: Clear to auscultation bilaterally;   HEART: +S1S2 Regular rate and rhythm; No murmurs,   ABDOMEN: Soft, Nontender, Nondistended; Bowel sounds present  EXTREMITIES:  2+ Peripheral Pulses, No clubbing, cyanosis, or edema  PSYCH: AAOx3, calm, cooperative  NEUROLOGY: non-focal  SKIN: No rashes or lesions    LABS:                        7.7    6.38  )-----------( 246      ( 17 Aug 2018 07:05 )             24.8     Mean Cell Volume: 82.1 fL (18 @ 07:05)    08-    134<L>  |  100  |  28<H>  ----------------------------<  98  4.0   |  24  |  1.05    Ca    9.0      17 Aug 2018 07:05  Phos  2.4     08-  Mg     1.6     08-17    Hemoglobin A1C, Whole Blood: 9.7    Lipid Profile (18 @ 07:05)    Cholesterol, Serum: 172 mg/dL    Triglycerides, Serum: 51 mg/dL    HDL Cholesterol, Serum: 82 mg/dL    Direct LDL: 82:   RESULT (mg/dL)   (For adults 18 years and older)  ----------------------------------------------------------  Below 70         Ideal for people at very high risk of  heart disease  Below 100        Ideal for people at risk of heart disease  100 - 129        Near [place]  130 - 159        Borderline high  160 - 189        High  190 and above    Very high mg/dL        TPro  6.7  /  Alb  3.7  /  TBili  0.3  /  DBili  x   /  AST  24  /  ALT  7   /  AlkPhos  792<H>      Urinalysis Basic - ( 16 Aug 2018 20:56 )    Color: LIGHT YELLOW / Appearance: Lt TURBID / S.015 / pH: 6.0  Gluc: NEGATIVE / Ketone: NEGATIVE  / Bili: NEGATIVE / Urobili: NORMAL   Blood: NEGATIVE / Protein: TRACE / Nitrite: POSITIVE   Leuk Esterase: SMALL / RBC: NEGATIVE HPF / WBC 6-10 HPF   Sq Epi: OCC HPF / Non Sq Epi: x / Bacteria: MODERATE HPF    Consultant(s) Notes Reviewed:    Endocrine - diabetic regimen adjusted per recommendations with low dose insulin scale and introduction of prandin     Assessment and Plan:

## 2018-08-19 LAB
BUN SERPL-MCNC: 25 MG/DL — HIGH (ref 7–23)
CALCIUM SERPL-MCNC: 8.8 MG/DL — SIGNIFICANT CHANGE UP (ref 8.4–10.5)
CHLORIDE SERPL-SCNC: 100 MMOL/L — SIGNIFICANT CHANGE UP (ref 98–107)
CO2 SERPL-SCNC: 21 MMOL/L — LOW (ref 22–31)
CREAT SERPL-MCNC: 0.95 MG/DL — SIGNIFICANT CHANGE UP (ref 0.5–1.3)
GLUCOSE SERPL-MCNC: 75 MG/DL — SIGNIFICANT CHANGE UP (ref 70–99)
HCT VFR BLD CALC: 25.8 % — LOW (ref 39–50)
HGB BLD-MCNC: 8 G/DL — LOW (ref 13–17)
MAGNESIUM SERPL-MCNC: 1.7 MG/DL — SIGNIFICANT CHANGE UP (ref 1.6–2.6)
MCHC RBC-ENTMCNC: 26.1 PG — LOW (ref 27–34)
MCHC RBC-ENTMCNC: 31 % — LOW (ref 32–36)
MCV RBC AUTO: 84.3 FL — SIGNIFICANT CHANGE UP (ref 80–100)
NRBC # FLD: 0 — SIGNIFICANT CHANGE UP
PLATELET # BLD AUTO: 243 K/UL — SIGNIFICANT CHANGE UP (ref 150–400)
PMV BLD: 8.8 FL — SIGNIFICANT CHANGE UP (ref 7–13)
POTASSIUM SERPL-MCNC: 3.9 MMOL/L — SIGNIFICANT CHANGE UP (ref 3.5–5.3)
POTASSIUM SERPL-SCNC: 3.9 MMOL/L — SIGNIFICANT CHANGE UP (ref 3.5–5.3)
RBC # BLD: 3.06 M/UL — LOW (ref 4.2–5.8)
RBC # FLD: 19.7 % — HIGH (ref 10.3–14.5)
SODIUM SERPL-SCNC: 139 MMOL/L — SIGNIFICANT CHANGE UP (ref 135–145)
WBC # BLD: 4.91 K/UL — SIGNIFICANT CHANGE UP (ref 3.8–10.5)
WBC # FLD AUTO: 4.91 K/UL — SIGNIFICANT CHANGE UP (ref 3.8–10.5)

## 2018-08-19 PROCEDURE — 99233 SBSQ HOSP IP/OBS HIGH 50: CPT

## 2018-08-19 RX ADMIN — LATANOPROST 1 DROP(S): 0.05 SOLUTION/ DROPS OPHTHALMIC; TOPICAL at 21:30

## 2018-08-19 RX ADMIN — Medication 325 MILLIGRAM(S): at 12:57

## 2018-08-19 RX ADMIN — DORZOLAMIDE HYDROCHLORIDE TIMOLOL MALEATE 1 DROP(S): 20; 5 SOLUTION/ DROPS OPHTHALMIC at 17:50

## 2018-08-19 RX ADMIN — DORZOLAMIDE HYDROCHLORIDE TIMOLOL MALEATE 1 DROP(S): 20; 5 SOLUTION/ DROPS OPHTHALMIC at 05:56

## 2018-08-19 RX ADMIN — Medication 1 TABLET(S): at 17:50

## 2018-08-19 RX ADMIN — Medication 2: at 12:57

## 2018-08-19 RX ADMIN — REPAGLINIDE 0.5 MILLIGRAM(S): 1 TABLET ORAL at 17:50

## 2018-08-19 RX ADMIN — REPAGLINIDE 0.5 MILLIGRAM(S): 1 TABLET ORAL at 08:35

## 2018-08-19 RX ADMIN — REPAGLINIDE 0.5 MILLIGRAM(S): 1 TABLET ORAL at 12:57

## 2018-08-19 RX ADMIN — CEFTRIAXONE 100 GRAM(S): 500 INJECTION, POWDER, FOR SOLUTION INTRAMUSCULAR; INTRAVENOUS at 18:00

## 2018-08-19 RX ADMIN — Medication 1 TABLET(S): at 08:34

## 2018-08-19 RX ADMIN — Medication 4: at 22:50

## 2018-08-19 NOTE — PROGRESS NOTE ADULT - SUBJECTIVE AND OBJECTIVE BOX
Patient is a 89y old  Male who presents with a chief complaint of hypotension, hypoglycemia and fall (17 Aug 2018 02:46)    SUBJECTIVE / OVERNIGHT EVENTS: Patient seen and examined. Denies overnight events such as fevers/chills, CP, SOB, palpitations, dizziness or fall.     TELE: Asymptomatic bradycardia overnight     MEDICATIONS  (STANDING):  dextrose 5%. 1000 milliLiter(s) (50 mL/Hr) IV Continuous <Continuous>  dextrose 50% Injectable 12.5 Gram(s) IV Push once  dextrose 50% Injectable 25 Gram(s) IV Push once  dextrose 50% Injectable 25 Gram(s) IV Push once  dextrose 50% Injectable 50 milliLiter(s) IV Push daily  dorzolamide 2%/timolol 0.5% Ophthalmic Solution 1 Drop(s) Both EYES two times a day  ferrous    sulfate 325 milliGRAM(s) Oral daily  insulin lispro (HumaLOG) corrective regimen sliding scale   SubCutaneous three times a day before meals  insulin lispro (HumaLOG) corrective regimen sliding scale   SubCutaneous at bedtime  lactobacillus acidophilus 1 Tablet(s) Oral two times a day with meals  latanoprost 0.005% Ophthalmic Solution 1 Drop(s) Both EYES at bedtime    MEDICATIONS  (PRN):  dextrose 40% Gel 15 Gram(s) Oral once PRN Blood Glucose LESS THAN 70 milliGRAM(s)/deciliter  glucagon  Injectable 1 milliGRAM(s) IntraMuscular once PRN Glucose LESS THAN 70 milligrams/deciliter    ICU Vital Signs Last 24 Hrs  T(C): 36.7 (19 Aug 2018 05:26), Max: 36.8 (18 Aug 2018 22:07)  T(F): 98.1 (19 Aug 2018 05:26), Max: 98.3 (18 Aug 2018 22:07)  HR: 55 (19 Aug 2018 07:45) (55 - 64)  BP: 102/53 (19 Aug 2018 05:26) (99/47 - 105/44)  BP(mean): --  ABP: --  ABP(mean): --  RR: 16 (19 Aug 2018 07:45) (16 - 18)  SpO2: 100% (19 Aug 2018 05:26) (99% - 100%)    PHYSICAL EXAM:  GENERAL: NAD, cachectic, in chair  HEAD:  Atraumatic, Normocephalic  EYES: conjunctiva and sclera clear  NECK: Supple, No JVD  CHEST/LUNG: Clear to auscultation bilaterally;   HEART: +S1S2 Regular rate and rhythm; No murmurs,   ABDOMEN: Soft, Nontender, Nondistended; Bowel sounds present  EXTREMITIES:  2+ Peripheral Pulses, No clubbing, cyanosis, or edema  PSYCH: AAOx3, calm, cooperative  NEUROLOGY: non-focal  SKIN: No rashes or lesions    LABS:                        8.0    4.91  )-----------( 243      ( 19 Aug 2018 07:30 )             25.8       08-19    139  |  100  |  25<H>  ----------------------------<  75  3.9   |  21<L>  |  0.95    Ca    8.8      19 Aug 2018 07:30  Mg     1.7     08-19      Endocrine - diabetic regimen adjusted per recommendations with low dose insulin scale and introduction of prandin     Assessment and Plan:

## 2018-08-19 NOTE — PROGRESS NOTE ADULT - PROBLEM SELECTOR PLAN 1
- patient presents with FS initially of 94, dropping to 40s three times throughout the course of the night, now improved on low dose insulin sliding scall and prandin tid, appreciate endocrine recommendations

## 2018-08-19 NOTE — PROGRESS NOTE ADULT - PROBLEM SELECTOR PLAN 3
- patient with fall- changes story as to whether or not he can remember fall  - may be in setting of hypoglycemia  - PT recs home with home PT; CT head negative for acute bleed  - fall risk and fall precautions  - TTE in AM; tele reveals ventricular bigeminy, bradycardia

## 2018-08-19 NOTE — PROGRESS NOTE ADULT - ASSESSMENT
89 yo M with uncontrolled DM2 (A1c=9.6), HTN, stage IV prostate cancer, mild dementia, presents to the ED with hypotension and hypoglycemia from his PCP's office, also noted to have normocytic anemia.

## 2018-08-19 NOTE — PROGRESS NOTE ADULT - PROBLEM SELECTOR PLAN 2
- Likely secondary chronic inflammation given elevated ferritin, continue to trend daily and transfuse PRN Hgb <7 or <8 if symptomatic. Hemoglobin is 8 today

## 2018-08-20 ENCOUNTER — TRANSCRIPTION ENCOUNTER (OUTPATIENT)
Age: 83
End: 2018-08-20

## 2018-08-20 LAB
BUN SERPL-MCNC: 21 MG/DL — SIGNIFICANT CHANGE UP (ref 7–23)
BUN SERPL-MCNC: 21 MG/DL — SIGNIFICANT CHANGE UP (ref 7–23)
C PEPTIDE SERPL-MCNC: 0.8 NG/ML — LOW (ref 0.9–7.1)
CALCIUM SERPL-MCNC: 9 MG/DL — SIGNIFICANT CHANGE UP (ref 8.4–10.5)
CALCIUM SERPL-MCNC: 9 MG/DL — SIGNIFICANT CHANGE UP (ref 8.4–10.5)
CHLORIDE SERPL-SCNC: 102 MMOL/L — SIGNIFICANT CHANGE UP (ref 98–107)
CHLORIDE SERPL-SCNC: 102 MMOL/L — SIGNIFICANT CHANGE UP (ref 98–107)
CO2 SERPL-SCNC: 23 MMOL/L — SIGNIFICANT CHANGE UP (ref 22–31)
CO2 SERPL-SCNC: 23 MMOL/L — SIGNIFICANT CHANGE UP (ref 22–31)
CREAT SERPL-MCNC: 0.89 MG/DL — SIGNIFICANT CHANGE UP (ref 0.5–1.3)
CREAT SERPL-MCNC: 0.89 MG/DL — SIGNIFICANT CHANGE UP (ref 0.5–1.3)
GLUCOSE SERPL-MCNC: 136 MG/DL — HIGH (ref 70–99)
GLUCOSE SERPL-MCNC: 136 MG/DL — HIGH (ref 70–99)
HCT VFR BLD CALC: 25.2 % — LOW (ref 39–50)
HGB BLD-MCNC: 7.4 G/DL — LOW (ref 13–17)
MAGNESIUM SERPL-MCNC: 1.7 MG/DL — SIGNIFICANT CHANGE UP (ref 1.6–2.6)
MCHC RBC-ENTMCNC: 24.7 PG — LOW (ref 27–34)
MCHC RBC-ENTMCNC: 29.4 % — LOW (ref 32–36)
MCV RBC AUTO: 84.3 FL — SIGNIFICANT CHANGE UP (ref 80–100)
NRBC # FLD: 0 — SIGNIFICANT CHANGE UP
PLATELET # BLD AUTO: 229 K/UL — SIGNIFICANT CHANGE UP (ref 150–400)
PMV BLD: 8.7 FL — SIGNIFICANT CHANGE UP (ref 7–13)
POTASSIUM SERPL-MCNC: 3.7 MMOL/L — SIGNIFICANT CHANGE UP (ref 3.5–5.3)
POTASSIUM SERPL-MCNC: 3.7 MMOL/L — SIGNIFICANT CHANGE UP (ref 3.5–5.3)
POTASSIUM SERPL-SCNC: 3.7 MMOL/L — SIGNIFICANT CHANGE UP (ref 3.5–5.3)
POTASSIUM SERPL-SCNC: 3.7 MMOL/L — SIGNIFICANT CHANGE UP (ref 3.5–5.3)
RBC # BLD: 2.99 M/UL — LOW (ref 4.2–5.8)
RBC # FLD: 19.3 % — HIGH (ref 10.3–14.5)
SODIUM SERPL-SCNC: 140 MMOL/L — SIGNIFICANT CHANGE UP (ref 135–145)
SODIUM SERPL-SCNC: 140 MMOL/L — SIGNIFICANT CHANGE UP (ref 135–145)
WBC # BLD: 5.45 K/UL — SIGNIFICANT CHANGE UP (ref 3.8–10.5)
WBC # FLD AUTO: 5.45 K/UL — SIGNIFICANT CHANGE UP (ref 3.8–10.5)

## 2018-08-20 PROCEDURE — 74230 X-RAY XM SWLNG FUNCJ C+: CPT | Mod: 26

## 2018-08-20 PROCEDURE — 99232 SBSQ HOSP IP/OBS MODERATE 35: CPT

## 2018-08-20 PROCEDURE — 99233 SBSQ HOSP IP/OBS HIGH 50: CPT

## 2018-08-20 RX ORDER — MAGNESIUM SULFATE 500 MG/ML
2 VIAL (ML) INJECTION ONCE
Qty: 0 | Refills: 0 | Status: COMPLETED | OUTPATIENT
Start: 2018-08-20 | End: 2018-08-20

## 2018-08-20 RX ORDER — POTASSIUM CHLORIDE 20 MEQ
40 PACKET (EA) ORAL ONCE
Qty: 0 | Refills: 0 | Status: COMPLETED | OUTPATIENT
Start: 2018-08-20 | End: 2018-08-20

## 2018-08-20 RX ADMIN — Medication 1: at 09:36

## 2018-08-20 RX ADMIN — REPAGLINIDE 0.5 MILLIGRAM(S): 1 TABLET ORAL at 17:52

## 2018-08-20 RX ADMIN — Medication 40 MILLIEQUIVALENT(S): at 17:52

## 2018-08-20 RX ADMIN — Medication 325 MILLIGRAM(S): at 11:36

## 2018-08-20 RX ADMIN — Medication 3: at 17:50

## 2018-08-20 RX ADMIN — REPAGLINIDE 0.5 MILLIGRAM(S): 1 TABLET ORAL at 09:36

## 2018-08-20 RX ADMIN — Medication 1 TABLET(S): at 17:53

## 2018-08-20 RX ADMIN — DORZOLAMIDE HYDROCHLORIDE TIMOLOL MALEATE 1 DROP(S): 20; 5 SOLUTION/ DROPS OPHTHALMIC at 17:52

## 2018-08-20 RX ADMIN — REPAGLINIDE 0.5 MILLIGRAM(S): 1 TABLET ORAL at 11:36

## 2018-08-20 RX ADMIN — Medication 1 TABLET(S): at 09:36

## 2018-08-20 RX ADMIN — DORZOLAMIDE HYDROCHLORIDE TIMOLOL MALEATE 1 DROP(S): 20; 5 SOLUTION/ DROPS OPHTHALMIC at 06:17

## 2018-08-20 RX ADMIN — LATANOPROST 1 DROP(S): 0.05 SOLUTION/ DROPS OPHTHALMIC; TOPICAL at 21:17

## 2018-08-20 RX ADMIN — Medication 1: at 13:02

## 2018-08-20 RX ADMIN — Medication 50 GRAM(S): at 17:52

## 2018-08-20 NOTE — PROGRESS NOTE ADULT - PROBLEM SELECTOR PLAN 1
- patient presents with FS initially of 94, dropping to 40s three times throughout the course of the night, now improved on low dose insulin sliding scale and prandin tid, appreciate endocrine recommendations  - f/u Endo final recs given fluctuating FS

## 2018-08-20 NOTE — SWALLOW VFSS/MBS ASSESSMENT ADULT - DIAGNOSTIC IMPRESSIONS
Patient presents with Mild Oral Stage and Mild to Moderate Pharyngeal Stage Dysphagia. The Oral Stage is characterized by adequate oral containment, slow/prolonged chewing for solid trial, slow bolus manipulation, slow tongue motion with slow anterior to posterior transfer of the bolus for solid/puree trials; piecemeal deglutition for solids/puree with trace oral clearance deficit located along tongue surface post swallow.   The Pharyngeal Stage is characterized by delayed initiation of the pharyngeal swallow (Bolus head is at the pyriforms for Thin Liquids), reduced laryngeal elevation, reduced tongue base retraction resulting in trace/mild vallecular residue post primary swallow for puree/solids and adequate pharyngeal constriction.  There is trace/mild pharyngeal clearance deficit located primarily in the vallecular post swallow.   There was Trace Laryngeal Penetration during the swallow for Thin Liquids with retrieval and airway protection maintained. There was No Aspiration observed before, during or after the swallow across all PO Trials.

## 2018-08-20 NOTE — PROGRESS NOTE ADULT - PROBLEM SELECTOR PLAN 1
BG have been mostly controlled other than one episode of hyperglycemia last night > 400 mg/dl - patient reports eating 2 large pieces of cake.  He was advised to avoid cake moving forward.  C-peptide was 0.8 which is slightly below normal but approaching normal and patient has overall been responding to Prandin.  Would continue Prandin 0.5mg qac TID.  DC plan:  Would stop insulin and prior oral agents and change to prandin to avoid further hypoglycemic events.  Not looking for tight control.

## 2018-08-20 NOTE — SWALLOW VFSS/MBS ASSESSMENT ADULT - PHARYNGEAL PHASE COMMENTS
adequate initiation of the pharyngeal swallow, adequate laryngeal elevation, reduced tongue base retraction, adequate pharyngeal constriction delayed initiation of the pharyngeal swallow, adequate laryngeal elevation, reduced tongue base retraction, adequate pharyngeal constriction delayed initiation of the pharyngeal swallow, reduced laryngeal elevation, reduced tongue base retraction, adequate pharyngeal constriction

## 2018-08-20 NOTE — SWALLOW VFSS/MBS ASSESSMENT ADULT - RECOMMENDED CONSISTENCY
1.) Mechanical Soft with Thin Liquids  2.) Feeding/Swallowing Guidelines: Sit upright, small bites, chew mechanical soft solids well, small single cup sips of thin liquids; two swallows per bite; alternate with a liquid wash after every 2 to 3 bites.  3.) Aspiration Precautions  4.) Maintain Good Oral Hygiene Care

## 2018-08-20 NOTE — DISCHARGE NOTE ADULT - MEDICATION SUMMARY - MEDICATIONS TO TAKE
I will START or STAY ON the medications listed below when I get home from the hospital:    aspirin 325 mg oral tablet  -- 1 tab(s) by mouth once a day  -- Indication: For Prophylaxis    metFORMIN 500 mg oral tablet  -- 1 tab(s) by mouth 2 times a day  -- Indication: For Diabetes    repaglinide 0.5 mg oral tablet  -- 1 tab(s) by mouth 3 times a day  -- Indication: For Diabetes    nystatin 100,000 units/g topical cream (obsolete)  -- Apply on skin to affected area 2 times a day  -- Indication: For Prophylaxis    simvastatin 10 mg oral tablet  -- 1 tab(s) by mouth once a day (at bedtime)  -- Indication: For Hyperlipidemia    megestrol 20 mg oral tablet  -- 1 tab(s) by mouth once a day for 20 days  -- Indication: For Appetite supplements    ciclopirox 0.77% topical cream  -- Apply on skin to affected area 2 times a day  -- Indication: For Glaucoma    Lasix 20 mg oral tablet  --  by mouth 2 times a day  -- Indication: For HTN (hypertension)    ferrous sulfate 325 mg (65 mg elemental iron) oral tablet  -- 1 tab(s) by mouth once a day  -- Indication: For supplements    latanoprost 0.005% ophthalmic solution  -- 1 drop(s) to each affected eye once a day (in the evening)  -- Indication: For Glaucoma    dorzolamide-timolol 2.23%-0.68% ophthalmic solution  -- 1 drop(s) to each affected eye 2 times a day  -- Indication: For Glaucoma    omeprazole 20 mg oral delayed release tablet  -- 1 tab(s) by mouth once a day   -- Obtain medical advice before taking any non-prescription drugs as some may affect the action of this medication.  Swallow whole.  Do not crush.    -- Indication: For Prophylaxis I will START or STAY ON the medications listed below when I get home from the hospital:    aspirin 325 mg oral tablet  -- 1 tab(s) by mouth once a day  -- Indication: For Prophylaxis    repaglinide 0.5 mg oral tablet  -- 1 tab(s) by mouth 3 times a day  -- Indication: For Diabetes    nystatin 100,000 units/g topical cream (obsolete)  -- Apply on skin to affected area 2 times a day  -- Indication: For Prophylaxis    simvastatin 10 mg oral tablet  -- 1 tab(s) by mouth once a day (at bedtime)  -- Indication: For Hyperlipidemia    megestrol 20 mg oral tablet  -- 1 tab(s) by mouth once a day for 20 days  -- Indication: For Appetite supplements    ciclopirox 0.77% topical cream  -- Apply on skin to affected area 2 times a day  -- Indication: For Glaucoma    Lasix 20 mg oral tablet  --  by mouth 2 times a day  -- Indication: For HTN (hypertension)    ferrous sulfate 325 mg (65 mg elemental iron) oral tablet  -- 1 tab(s) by mouth once a day  -- Indication: For supplements    latanoprost 0.005% ophthalmic solution  -- 1 drop(s) to each affected eye once a day (in the evening)  -- Indication: For Glaucoma    dorzolamide-timolol 2.23%-0.68% ophthalmic solution  -- 1 drop(s) to each affected eye 2 times a day  -- Indication: For Glaucoma    omeprazole 20 mg oral delayed release tablet  -- 1 tab(s) by mouth once a day   -- Obtain medical advice before taking any non-prescription drugs as some may affect the action of this medication.  Swallow whole.  Do not crush.    -- Indication: For Prophylaxis I will START or STAY ON the medications listed below when I get home from the hospital:    Aspirin Enteric Coated 325 mg oral delayed release tablet  -- 1 tab(s) by mouth once a day  -- Indication: For CVA    repaglinide 0.5 mg oral tablet  -- 1 tab(s) by mouth 3 times a day  -- Indication: For Diabetes    nystatin 100,000 units/g topical cream (obsolete)  -- Apply on skin to affected area 2 times a day  -- Indication: For Prophylaxis    ciclopirox 0.77% topical cream  -- Apply on skin to affected area 2 times a day  -- Indication: For Glaucoma    ferrous sulfate 325 mg (65 mg elemental iron) oral tablet  -- 1 tab(s) by mouth once a day  -- Indication: For supplements    latanoprost 0.005% ophthalmic solution  -- 1 drop(s) to each affected eye once a day (in the evening)  -- Indication: For Glaucoma    dorzolamide-timolol 2.23%-0.68% ophthalmic solution  -- 1 drop(s) to each affected eye 2 times a day  -- Indication: For Glaucoma    omeprazole 20 mg oral delayed release tablet  -- 1 tab(s) by mouth once a day   -- Obtain medical advice before taking any non-prescription drugs as some may affect the action of this medication.  Swallow whole.  Do not crush.    -- Indication: For GI Prophylaxis

## 2018-08-20 NOTE — DISCHARGE NOTE ADULT - MEDICATION SUMMARY - MEDICATIONS TO STOP TAKING
I will STOP taking the medications listed below when I get home from the hospital:    glipiZIDE 10 mg oral tablet  -- 1 tab(s) by mouth once a day    NIFEdipine 60 mg oral tablet, extended release  -- 1 tab(s) by mouth once a day    Levemir 100 units/mL subcutaneous solution  -- 25 unit(s) subcutaneous once a day I will STOP taking the medications listed below when I get home from the hospital:    simvastatin 10 mg oral tablet  -- 1 tab(s) by mouth once a day (at bedtime)    Lasix 20 mg oral tablet  --  by mouth 2 times a day    glipiZIDE 10 mg oral tablet  -- 1 tab(s) by mouth once a day    Levemir 100 units/mL subcutaneous solution  -- 20 unit(s) subcutaneous once a day    megestrol 20 mg oral tablet  -- 1 tab(s) by mouth once a day for 20 days    metFORMIN 500 mg oral tablet  -- 1 tab(s) by mouth 2 times a day    NIFEdipine 60 mg oral tablet, extended release  -- 1 tab(s) by mouth once a day    Levemir 100 units/mL subcutaneous solution  -- 25 unit(s) subcutaneous once a day

## 2018-08-20 NOTE — PROGRESS NOTE ADULT - PROBLEM SELECTOR PLAN 6
- patient with positive UA +LE, + Nitrite and + bacteria  - improved from UA from outpatient, however still grossly abnormal  - Will d/c Abx given normal UCx

## 2018-08-20 NOTE — DISCHARGE NOTE ADULT - PATIENT PORTAL LINK FT
You can access the [facility] Portal, offered by [facility], by registering with the following website: [email or web address]

## 2018-08-20 NOTE — PROGRESS NOTE ADULT - SUBJECTIVE AND OBJECTIVE BOX
Patient is a 89y old  Male who presents with a chief complaint of hypotension, hypoglycemia and fall (20 Aug 2018 12:38)      SUBJECTIVE / OVERNIGHT EVENTS: Denies dizziness, CP or SOB    MEDICATIONS  (STANDING):  dextrose 5%. 1000 milliLiter(s) (50 mL/Hr) IV Continuous <Continuous>  dextrose 50% Injectable 12.5 Gram(s) IV Push once  dextrose 50% Injectable 25 Gram(s) IV Push once  dextrose 50% Injectable 25 Gram(s) IV Push once  dorzolamide 2%/timolol 0.5% Ophthalmic Solution 1 Drop(s) Both EYES two times a day  ferrous    sulfate 325 milliGRAM(s) Oral daily  insulin lispro (HumaLOG) corrective regimen sliding scale   SubCutaneous three times a day before meals  insulin lispro (HumaLOG) corrective regimen sliding scale   SubCutaneous at bedtime  lactobacillus acidophilus 1 Tablet(s) Oral two times a day with meals  latanoprost 0.005% Ophthalmic Solution 1 Drop(s) Both EYES at bedtime  repaglinide 0.5 milliGRAM(s) Oral three times a day    MEDICATIONS  (PRN):  dextrose 40% Gel 15 Gram(s) Oral once PRN Blood Glucose LESS THAN 70 milliGRAM(s)/deciliter  glucagon  Injectable 1 milliGRAM(s) IntraMuscular once PRN Glucose LESS THAN 70 milligrams/deciliter      T(C): 36.8 (08-20-18 @ 15:21), Max: 36.9 (08-19-18 @ 20:41)  HR: 80 (08-20-18 @ 15:21) (62 - 80)  BP: 156/68 (08-20-18 @ 15:21) (98/50 - 156/68)  RR: 17 (08-20-18 @ 15:21) (16 - 18)  SpO2: 100% (08-20-18 @ 15:21) (100% - 100%)  CAPILLARY BLOOD GLUCOSE      POCT Blood Glucose.: 177 mg/dL (20 Aug 2018 12:27)  POCT Blood Glucose.: 178 mg/dL (20 Aug 2018 09:33)  POCT Blood Glucose.: 401 mg/dL (19 Aug 2018 23:24)  POCT Blood Glucose.: 419 mg/dL (19 Aug 2018 22:26)  POCT Blood Glucose.: 424 mg/dL (19 Aug 2018 22:24)  POCT Blood Glucose.: 125 mg/dL (19 Aug 2018 17:12)    I&O's Summary    19 Aug 2018 07:01  -  20 Aug 2018 07:00  --------------------------------------------------------  IN: 30 mL / OUT: 275 mL / NET: -245 mL        PHYSICAL EXAM:  GENERAL: NAD,   HEAD:  Normocephalic  EYES: EOMI,  conjunctiva and sclera clear  NECK: Supple,   CHEST/LUNG: Clear to auscultation bilaterally; No wheeze  HEART:  s1 s2 + Regular rate and rhythm;   ABDOMEN: Soft, Nontender, Nondistended; Bowel sounds present  EXTREMITIES:   No clubbing, cyanosis  NEURO: AAOx2 ( self and place)      LABS:                        7.4    5.45  )-----------( 229      ( 20 Aug 2018 07:50 )             25.2     08-20    140  |  102  |  21  ----------------------------<  136<H>  3.7   |  23  |  0.89    Ca    9.0      20 Aug 2018 07:50  Mg     1.7     08-20

## 2018-08-20 NOTE — DISCHARGE NOTE ADULT - CARE PROVIDER_API CALL
Maryana Cheng), Internal Medicine  [address] [place] [address]  Phone: [phone]  Fax: [phone]

## 2018-08-20 NOTE — DISCHARGE NOTE ADULT - COMMUNITY RESOURCES
Home attendant to resume tomorrow 8/22 as per [facility] [phone]

## 2018-08-20 NOTE — DISCHARGE NOTE ADULT - CARE PLAN
Principal Discharge DX:	Hypoglycemia  Goal:	Close Monitoring  Assessment and plan of treatment:	Follow up with Your PMD or Endocrinologist in 1 week  Secondary Diagnosis:	Anemia  Goal:	To treat the underlying cause and restore a normal red blood cells level, to improve  the amount of oxygen that your blood can carry by increasing your hemoglobin and hematocrit level, and to prevent signs and symptoms such as shortness of breath, dizziness, weakness, congestive heart failure and death.  Assessment and plan of treatment:	Continue to take current medications as prescribed.  Follow up your routine physician's appointments.  If you notice any bleeding, please notify your doctor immediately or go to the nearest emergency room.  Secondary Diagnosis:	Fall at home  Goal:	Home PT  Secondary Diagnosis:	Diabetes  Goal:	To maintain a normal blood glucose level and HgA1C level < 5.7 and to prevent diabetic complications such as uncontrolled diabetes, diabetic coma, blindness, renal failure, and amputations.  Assessment and plan of treatment:	Monitor finger sticks pre-meal and bedtime, low salt, fat and carbohydrate diet, minimize glucose intake.  Exercise daily for at least 30 minutes and weight loss.  Follow up with primary care physician and endocrinologist for routine Hemoglobin A1C checks and management.  Follow up with your ophthalmologist for routine yearly vision exams.  Secondary Diagnosis:	HTN (hypertension)  Goal:	To maintain a normal blood pressure to prevent heart attack, stroke and renal failure.  Assessment and plan of treatment:	Low sodium and fat diet, continue anti-hypertensive medications, and follow up with primary care physician.  Secondary Diagnosis:	Prostate cancer  Assessment and plan of treatment:	Follow up with Your Oncologist

## 2018-08-20 NOTE — DISCHARGE NOTE ADULT - HOSPITAL COURSE
89M with PMHx of DM2, HTN, stage IV metastatic prostate cancer, mild dementia, presents to the ED with hypotension and hypoglycemia from his PCP's office, found to be hypoglycemic and anemic.   Hospital Course:    Hypoglycemia.  Plan: - patient presents with FS initially of 94, dropping to 40s three times throughout the course of the night, now improved on low dose insulin sliding scall and prandin tid, appreciate endocrine recommendations.      Problem/Plan - 2:  ·  Problem: Anemia.  Plan: - Likely secondary chronic inflammation given elevated ferritin, continue to trend daily and transfuse PRN Hgb <7 or <8 if symptomatic. Hemoglobin is 8 today.      Problem/Plan - 3:  ·  Problem: Fall at home.  Plan: - patient with fall- changes story as to whether or not he can remember fall  - may be in setting of hypoglycemia  - PT recs home with home PT; CT head negative for acute bleed  - fall risk and fall precautions  - TTE in AM; tele reveals ventricular bigeminy, bradycardia.      Problem/Plan - 4:  ·  Problem: Insulin dependent diabetes mellitus.  Plan: - per [facility] care documentation- patient currently on glipizide, metformin and levemir (20 or 25 units, unclear)  - A1c= 9.4  - given high risk of hypoglycemia, patient's medications should be optimized  -appreciate endocrine recommendations.      Problem/Plan - 5:  ·  Problem: Failure to thrive in adult.  Plan: - low appetite per family and patient cachetic appearing  - likely in setting of age and stage IV cancer  - started on megestrol as an outpatient presumably as appetite stimulant- will hold for now  - nutrition consult.      Problem/Plan - 6:  Problem: Abnormal urinalysis. Plan: - patient with positive UA +LE, + Nitrite and + bacteria  - improved from UA from outpatient, however still grossly abnormal  - given fall and dementia, and inability to elucidate encephalopathy at this time, will treat with ceftriaxone  - follow up urine cultures.     Problem/Plan - 7:  ·  Problem: Prostate cancer.  Plan: - stage IV metastatic cancer with known bony metastasis  - unknown prior cancer treatment- will need to obtain info from daughter in AM  - elevated alk phos likely from bone mets (last alk phos as outpatient 1211; currently  792  - call PCP to find out patient's outpt heme/onc doctor to elucidate prior treatment and inform regarding possible brain mets as seen on CT scan (unknown if old or new).      Problem/Plan - 8:  ·  Problem: HTN (hypertension).  Plan: - BP currently acceptable off anti-hypertensives, will monitor for now.      Problem/Plan - 9:  ·  Problem: Hyperlipidemia.  Plan: - Lipid profile with LDL of 82, will hold off resumption of statin for now given advanced age and would like to minimize medication burden for the patient. 89M with PMHx of DM2, HTN, stage IV metastatic prostate cancer, mild dementia, presents to the ED with hypotension and hypoglycemia from his PCP's office, found to be hypoglycemic and anemic.   Hospital Course:    Noted with Hypoglycemia.Seen y Endocrinology, Meds were adjusted      ·  Anemia.  Plan: - Likely secondary chronic inflammation given elevated ferritin, continue to trend daily and transfuse PRN Hgb <7 or <8 if symptomatic. Hemoglobin is 8 today.      Problem/Plan - 3:  ·  Problem: Fall at home.  Plan: - patient with fall- changes story as to whether or not he can remember fall  - may be in setting of hypoglycemia  - PT recs home with home PT; CT head negative for acute bleed  - fall risk and fall precautions  - TTE in AM; tele reveals ventricular bigeminy, bradycardia.      Problem/Plan - 4:  ·  Problem: Insulin dependent diabetes mellitus.  Plan: - per [facility] care documentation- patient currently on glipizide, metformin and levemir (20 or 25 units, unclear)  - A1c= 9.4  - given high risk of hypoglycemia, patient's medications should be optimized  -appreciate endocrine recommendations.      Problem/Plan - 5:  ·  Problem: Failure to thrive in adult.  Plan: - low appetite per family and patient cachetic appearing  - likely in setting of age and stage IV cancer  - started on megestrol as an outpatient presumably as appetite stimulant- will hold for now  - nutrition consult.      Problem/Plan - 6:  Problem: Abnormal urinalysis. Plan: - patient with positive UA +LE, + Nitrite and + bacteria  - improved from UA from outpatient, however still grossly abnormal  - given fall and dementia, and inability to elucidate encephalopathy at this time, will treat with ceftriaxone  - follow up urine cultures.     Problem/Plan - 7:  ·  Problem: Prostate cancer.  Plan: - stage IV metastatic cancer with known bony metastasis  - unknown prior cancer treatment- will need to obtain info from daughter in AM  - elevated alk phos likely from bone mets (last alk phos as outpatient 1211; currently  792  - call PCP to find out patient's outpt heme/onc doctor to elucidate prior treatment and inform regarding possible brain mets as seen on CT scan (unknown if old or new).      Problem/Plan - 8:  ·  Problem: HTN (hypertension).  Plan: - BP currently acceptable off anti-hypertensives, will monitor for now.      Problem/Plan - 9:  ·  Problem: Hyperlipidemia.  Plan: - Lipid profile with LDL of 82, will hold off resumption of statin for now given advanced age and would like to minimize medication burden for the patient. 89M with PMHx of DM2, HTN, stage IV metastatic prostate cancer, mild dementia, presents to the ED with hypotension and hypoglycemia from his PCP's office, found to be hypoglycemic and anemic.   Hospital Course:    Noted with Hypoglycemia.Seen y Endocrinology, Meds were adjusted      ·  Anemia.  Plan: - Likely secondary chronic inflammation     ·  Fall at home.  - PT recs home with home PT; CT head negative for acute bleed   *Insulin dependent diabetes mellitus.  Meds adjusted    ·  Failure to thrive in adult.  Plan: - low appetite per family and patient cachetic appearing  - likely in setting of age and stage IV cancer     ·  Prostate cancer.  Plan: - stage IV metastatic cancer with known bony metastasis      ·  HTN (hypertension).  Plan: - BP currently acceptable off anti-hypertensives, will monitor for now. 89M with PMHx of DM2, HTN, stage IV metastatic prostate cancer, mild dementia, presents to the ED with hypotension and hypoglycemia from his PCP's office, found to be hypoglycemic and anemic.   Hospital Course:    Noted with Hypoglycemia.Seen y Endocrinology, Meds were adjusted      ·  Anemia.  Plan: - Likely secondary chronic inflammation     ·  Fall at home.  - PT recs home with home PT; CT head negative for acute bleed   *Insulin dependent diabetes mellitus.  Meds adjusted    ·  Failure to thrive in adult.  Plan: - low appetite per family and patient cachetic appearing  - likely in setting of age and stage IV cancer     ·  Prostate cancer.  Plan: - stage IV metastatic cancer with known bony metastasis      ·  HTN (hypertension).  Plan: - BP currently acceptable off anti-hypertensives, will monitor for now.   Case discussed with attending, Pt is stable for Discharge Home. 89M with PMHx of DM2, HTN, stage IV metastatic prostate cancer, mild dementia, presents to the ED with hypotension and hypoglycemia from his PCP's office, found to be hypoglycemic and anemic.  Pt was admitted for Fall, hypoglycemia and r/o suncope. fall likely due to hypoglycemia TTE showed no acute findings, no events on Tele. Endo was consulted, Medications were adjusted, pt advised to stop insulin and other oral agents. PT recommended home PT. 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 Cui, plan to follow outpt.       Case discussed with attending, Pt is stable for Discharge Home.

## 2018-08-20 NOTE — DISCHARGE NOTE ADULT - ADDITIONAL INSTRUCTIONS
Please stop your Blood Pressure medication and follow up with Primary doctor in 1 week for Blood Pressure check

## 2018-08-20 NOTE — SWALLOW VFSS/MBS ASSESSMENT ADULT - COMMENTS
89M with PMHx of DM2, HTN, stage IV metastatic prostate cancer, mild dementia, presents to the ED with hypotension and hypoglycemia from his PCP's office, found to be hypoglycemic and anemic.     Patient was seen for a Clinical Swallow Eval on 8/17/2018 (See Consult).

## 2018-08-20 NOTE — PROGRESS NOTE ADULT - SUBJECTIVE AND OBJECTIVE BOX
Chief Complaint: DM2    History: Patient reports eating 2 large slices of cake last night.    MEDICATIONS  (STANDING):  dextrose 5%. 1000 milliLiter(s) (50 mL/Hr) IV Continuous <Continuous>  dextrose 50% Injectable 12.5 Gram(s) IV Push once  dextrose 50% Injectable 25 Gram(s) IV Push once  dextrose 50% Injectable 25 Gram(s) IV Push once  dorzolamide 2%/timolol 0.5% Ophthalmic Solution 1 Drop(s) Both EYES two times a day  ferrous    sulfate 325 milliGRAM(s) Oral daily  insulin lispro (HumaLOG) corrective regimen sliding scale   SubCutaneous three times a day before meals  insulin lispro (HumaLOG) corrective regimen sliding scale   SubCutaneous at bedtime  lactobacillus acidophilus 1 Tablet(s) Oral two times a day with meals  latanoprost 0.005% Ophthalmic Solution 1 Drop(s) Both EYES at bedtime  repaglinide 0.5 milliGRAM(s) Oral three times a day    MEDICATIONS  (PRN):  dextrose 40% Gel 15 Gram(s) Oral once PRN Blood Glucose LESS THAN 70 milliGRAM(s)/deciliter  glucagon  Injectable 1 milliGRAM(s) IntraMuscular once PRN Glucose LESS THAN 70 milligrams/deciliter      Allergies    No Known Allergies    Intolerances      Review of Systems:    ALL OTHER SYSTEMS REVIEWED AND NEGATIVE      PHYSICAL EXAM:  VITALS: T(C): 36.7 (08-20-18 @ 16:48)  T(F): 98 (08-20-18 @ 16:48), Max: 98.4 (08-19-18 @ 20:41)  HR: 73 (08-20-18 @ 16:48) (62 - 80)  BP: 118/50 (08-20-18 @ 16:48) (98/50 - 156/68)  RR:  (16 - 18)  SpO2:  (100% - 100%)  Wt(kg): --  GENERAL: NAD, well-groomed, well-developed  EYES: No proptosis, no lid lag, anicteric  HEENT:  Atraumatic, Normocephalic, moist mucous membranes  NEURO: extraocular movements intact, no tremor  PSYCH: Alert and oriented x 3, normal affect, normal mood      CAPILLARY BLOOD GLUCOSE      POCT Blood Glucose.: 288 mg/dL (20 Aug 2018 17:23)  POCT Blood Glucose.: 177 mg/dL (20 Aug 2018 12:27)  POCT Blood Glucose.: 178 mg/dL (20 Aug 2018 09:33)  POCT Blood Glucose.: 401 mg/dL (19 Aug 2018 23:24)  POCT Blood Glucose.: 419 mg/dL (19 Aug 2018 22:26)  POCT Blood Glucose.: 424 mg/dL (19 Aug 2018 22:24)      08-20    140  |  102  |  21  ----------------------------<  136<H>  3.7   |  23  |  0.89    EGFR if : 87  EGFR if non : 75    Ca    9.0      08-20  Mg     1.7     08-20            Thyroid Function Tests:      Hemoglobin A1C, Whole Blood: 9.7 % <H> [4.0 - 5.6] (08-16-18 @ 18:30)

## 2018-08-20 NOTE — PROGRESS NOTE ADULT - PROBLEM SELECTOR PLAN 2
- Likely secondary chronic inflammation given elevated ferritin, continue to trend daily and transfuse PRN Hgb <7 or <8

## 2018-08-20 NOTE — DISCHARGE NOTE ADULT - PLAN OF CARE
Close Monitoring Follow up with Your PMD or Endocrinologist in 1 week To treat the underlying cause and restore a normal red blood cells level, to improve  the amount of oxygen that your blood can carry by increasing your hemoglobin and hematocrit level, and to prevent signs and symptoms such as shortness of breath, dizziness, weakness, congestive heart failure and death. Continue to take current medications as prescribed.  Follow up your routine physician's appointments.  If you notice any bleeding, please notify your doctor immediately or go to the nearest emergency room. Home PT To maintain a normal blood glucose level and HgA1C level < 5.7 and to prevent diabetic complications such as uncontrolled diabetes, diabetic coma, blindness, renal failure, and amputations. Monitor finger sticks pre-meal and bedtime, low salt, fat and carbohydrate diet, minimize glucose intake.  Exercise daily for at least 30 minutes and weight loss.  Follow up with primary care physician and endocrinologist for routine Hemoglobin A1C checks and management.  Follow up with your ophthalmologist for routine yearly vision exams. To maintain a normal blood pressure to prevent heart attack, stroke and renal failure. Low sodium and fat diet, continue anti-hypertensive medications, and follow up with primary care physician. Follow up with Your Oncologist

## 2018-08-20 NOTE — PROGRESS NOTE ADULT - PROBLEM SELECTOR PLAN 3
- patient with fall- changes story as to whether or not he can remember fall  - may be in setting of hypoglycemia  - PT recs home with home PT; CT head negative for acute bleed  - fall risk and fall precautions  - TTE pending, Tele sinus

## 2018-08-21 LAB
ALBUMIN SERPL ELPH-MCNC: 3 G/DL — LOW (ref 3.3–5)
ALP SERPL-CCNC: 727 U/L — HIGH (ref 40–120)
ALT FLD-CCNC: 7 U/L — SIGNIFICANT CHANGE UP (ref 4–41)
AST SERPL-CCNC: 19 U/L — SIGNIFICANT CHANGE UP (ref 4–40)
BACTERIA BLD CULT: SIGNIFICANT CHANGE UP
BACTERIA BLD CULT: SIGNIFICANT CHANGE UP
BASOPHILS # BLD AUTO: 0.01 K/UL — SIGNIFICANT CHANGE UP (ref 0–0.2)
BASOPHILS NFR BLD AUTO: 0.2 % — SIGNIFICANT CHANGE UP (ref 0–2)
BILIRUB SERPL-MCNC: 0.2 MG/DL — SIGNIFICANT CHANGE UP (ref 0.2–1.2)
BUN SERPL-MCNC: 16 MG/DL — SIGNIFICANT CHANGE UP (ref 7–23)
CALCIUM SERPL-MCNC: 8.9 MG/DL — SIGNIFICANT CHANGE UP (ref 8.4–10.5)
CHLORIDE SERPL-SCNC: 104 MMOL/L — SIGNIFICANT CHANGE UP (ref 98–107)
CO2 SERPL-SCNC: 23 MMOL/L — SIGNIFICANT CHANGE UP (ref 22–31)
CREAT SERPL-MCNC: 0.84 MG/DL — SIGNIFICANT CHANGE UP (ref 0.5–1.3)
EOSINOPHIL # BLD AUTO: 0.04 K/UL — SIGNIFICANT CHANGE UP (ref 0–0.5)
EOSINOPHIL NFR BLD AUTO: 0.9 % — SIGNIFICANT CHANGE UP (ref 0–6)
GLUCOSE SERPL-MCNC: 101 MG/DL — HIGH (ref 70–99)
HCT VFR BLD CALC: 25.1 % — LOW (ref 39–50)
HGB BLD-MCNC: 7.6 G/DL — LOW (ref 13–17)
IMM GRANULOCYTES # BLD AUTO: 0.05 # — SIGNIFICANT CHANGE UP
IMM GRANULOCYTES NFR BLD AUTO: 1.1 % — SIGNIFICANT CHANGE UP (ref 0–1.5)
LYMPHOCYTES # BLD AUTO: 1.24 K/UL — SIGNIFICANT CHANGE UP (ref 1–3.3)
LYMPHOCYTES # BLD AUTO: 28.5 % — SIGNIFICANT CHANGE UP (ref 13–44)
MAGNESIUM SERPL-MCNC: 2.1 MG/DL — SIGNIFICANT CHANGE UP (ref 1.6–2.6)
MCHC RBC-ENTMCNC: 25.7 PG — LOW (ref 27–34)
MCHC RBC-ENTMCNC: 30.3 % — LOW (ref 32–36)
MCV RBC AUTO: 84.8 FL — SIGNIFICANT CHANGE UP (ref 80–100)
MONOCYTES # BLD AUTO: 0.51 K/UL — SIGNIFICANT CHANGE UP (ref 0–0.9)
MONOCYTES NFR BLD AUTO: 11.7 % — SIGNIFICANT CHANGE UP (ref 2–14)
NEUTROPHILS # BLD AUTO: 2.5 K/UL — SIGNIFICANT CHANGE UP (ref 1.8–7.4)
NEUTROPHILS NFR BLD AUTO: 57.6 % — SIGNIFICANT CHANGE UP (ref 43–77)
NRBC # FLD: 0 — SIGNIFICANT CHANGE UP
PHOSPHATE SERPL-MCNC: 2.4 MG/DL — LOW (ref 2.5–4.5)
PLATELET # BLD AUTO: 211 K/UL — SIGNIFICANT CHANGE UP (ref 150–400)
PMV BLD: 8.5 FL — SIGNIFICANT CHANGE UP (ref 7–13)
POTASSIUM SERPL-MCNC: 4.3 MMOL/L — SIGNIFICANT CHANGE UP (ref 3.5–5.3)
POTASSIUM SERPL-SCNC: 4.3 MMOL/L — SIGNIFICANT CHANGE UP (ref 3.5–5.3)
PROT SERPL-MCNC: 5.8 G/DL — LOW (ref 6–8.3)
RBC # BLD: 2.96 M/UL — LOW (ref 4.2–5.8)
RBC # FLD: 19.5 % — HIGH (ref 10.3–14.5)
SODIUM SERPL-SCNC: 141 MMOL/L — SIGNIFICANT CHANGE UP (ref 135–145)
WBC # BLD: 4.35 K/UL — SIGNIFICANT CHANGE UP (ref 3.8–10.5)
WBC # FLD AUTO: 4.35 K/UL — SIGNIFICANT CHANGE UP (ref 3.8–10.5)

## 2018-08-21 PROCEDURE — 93306 TTE W/DOPPLER COMPLETE: CPT | Mod: 26

## 2018-08-21 PROCEDURE — 99232 SBSQ HOSP IP/OBS MODERATE 35: CPT

## 2018-08-21 RX ORDER — REPAGLINIDE 1 MG/1
1 TABLET ORAL
Qty: 0 | Refills: 0 | COMMUNITY
Start: 2018-08-21

## 2018-08-21 RX ORDER — INSULIN DETEMIR 100/ML (3)
25 INSULIN PEN (ML) SUBCUTANEOUS
Qty: 0 | Refills: 0 | COMMUNITY

## 2018-08-21 RX ORDER — NIFEDIPINE 30 MG
1 TABLET, EXTENDED RELEASE 24 HR ORAL
Qty: 0 | Refills: 0 | COMMUNITY

## 2018-08-21 RX ORDER — OMEPRAZOLE 10 MG/1
1 CAPSULE, DELAYED RELEASE ORAL
Qty: 30 | Refills: 0 | OUTPATIENT
Start: 2018-08-21 | End: 2018-09-19

## 2018-08-21 RX ORDER — METFORMIN HYDROCHLORIDE 850 MG/1
1 TABLET ORAL
Qty: 0 | Refills: 0 | COMMUNITY

## 2018-08-21 RX ADMIN — Medication 1 TABLET(S): at 09:18

## 2018-08-21 RX ADMIN — LATANOPROST 1 DROP(S): 0.05 SOLUTION/ DROPS OPHTHALMIC; TOPICAL at 22:01

## 2018-08-21 RX ADMIN — Medication 1 TABLET(S): at 18:04

## 2018-08-21 RX ADMIN — REPAGLINIDE 0.5 MILLIGRAM(S): 1 TABLET ORAL at 12:20

## 2018-08-21 RX ADMIN — REPAGLINIDE 0.5 MILLIGRAM(S): 1 TABLET ORAL at 09:18

## 2018-08-21 RX ADMIN — DORZOLAMIDE HYDROCHLORIDE TIMOLOL MALEATE 1 DROP(S): 20; 5 SOLUTION/ DROPS OPHTHALMIC at 05:23

## 2018-08-21 RX ADMIN — REPAGLINIDE 0.5 MILLIGRAM(S): 1 TABLET ORAL at 18:06

## 2018-08-21 RX ADMIN — Medication 2: at 12:19

## 2018-08-21 RX ADMIN — DORZOLAMIDE HYDROCHLORIDE TIMOLOL MALEATE 1 DROP(S): 20; 5 SOLUTION/ DROPS OPHTHALMIC at 18:04

## 2018-08-21 RX ADMIN — Medication 325 MILLIGRAM(S): at 12:20

## 2018-08-21 RX ADMIN — Medication 2: at 18:03

## 2018-08-21 NOTE — PROGRESS NOTE ADULT - SUBJECTIVE AND OBJECTIVE BOX
Patient is a 89y old  Male who presents with a chief complaint of hypotension, hypoglycemia and fall (20 Aug 2018 12:38)      SUBJECTIVE / OVERNIGHT EVENTS: No pain, hypoglycemia or CP    MEDICATIONS  (STANDING):  dextrose 5%. 1000 milliLiter(s) (50 mL/Hr) IV Continuous <Continuous>  dextrose 50% Injectable 12.5 Gram(s) IV Push once  dextrose 50% Injectable 25 Gram(s) IV Push once  dextrose 50% Injectable 25 Gram(s) IV Push once  dorzolamide 2%/timolol 0.5% Ophthalmic Solution 1 Drop(s) Both EYES two times a day  ferrous    sulfate 325 milliGRAM(s) Oral daily  insulin lispro (HumaLOG) corrective regimen sliding scale   SubCutaneous three times a day before meals  insulin lispro (HumaLOG) corrective regimen sliding scale   SubCutaneous at bedtime  lactobacillus acidophilus 1 Tablet(s) Oral two times a day with meals  latanoprost 0.005% Ophthalmic Solution 1 Drop(s) Both EYES at bedtime  repaglinide 0.5 milliGRAM(s) Oral three times a day    MEDICATIONS  (PRN):  dextrose 40% Gel 15 Gram(s) Oral once PRN Blood Glucose LESS THAN 70 milliGRAM(s)/deciliter  glucagon  Injectable 1 milliGRAM(s) IntraMuscular once PRN Glucose LESS THAN 70 milligrams/deciliter      T(C): 36.7 (08-21-18 @ 13:45), Max: 36.8 (08-20-18 @ 15:21)  HR: 61 (08-21-18 @ 13:45) (58 - 80)  BP: 111/47 (08-21-18 @ 13:45) (111/47 - 156/68)  RR: 18 (08-21-18 @ 13:45) (16 - 18)  SpO2: 100% (08-21-18 @ 13:45) (100% - 100%)  CAPILLARY BLOOD GLUCOSE      POCT Blood Glucose.: 240 mg/dL (21 Aug 2018 12:13)  POCT Blood Glucose.: 115 mg/dL (21 Aug 2018 08:36)  POCT Blood Glucose.: 128 mg/dL (20 Aug 2018 22:27)  POCT Blood Glucose.: 288 mg/dL (20 Aug 2018 17:23)    I&O's Summary      PHYSICAL EXAM:  GENERAL: NAD,   HEAD:  Normocephalic  EYES: EOMI,  conjunctiva and sclera clear  NECK: Supple,   CHEST/LUNG: Clear to auscultation bilaterally; No wheeze  HEART:  s1 s2 + Regular rate and rhythm;   ABDOMEN: Soft, Nontender, Nondistended; Bowel sounds present  EXTREMITIES:   No clubbing, cyanosis  NEURO: AAOx2-3      LABS:                        7.6    4.35  )-----------( 211      ( 21 Aug 2018 07:20 )             25.1     08-21    141  |  104  |  16  ----------------------------<  101<H>  4.3   |  23  |  0.84    Ca    8.9      21 Aug 2018 07:20  Phos  2.4     08-21  Mg     2.1     08-21    TPro  5.8<L>  /  Alb  3.0<L>  /  TBili  0.2  /  DBili  x   /  AST  19  /  ALT  7   /  AlkPhos  727<H>  08-21

## 2018-08-21 NOTE — DIETITIAN INITIAL EVALUATION ADULT. - PROBLEM SELECTOR PLAN 1
- patient presents with FS initially of 94, dropping to 40s three times throughout the course of the night  - unknown how much levemir patient has taken at home; fall may have been secondary to hypoglycemia  - patient at high risk of hypoglycemia given he takes sulfonylurea at home and has dementia, making it unsafe to administer insulin   - will need to change diabetic regimen  - patient may have more episodes of hypoglycemia throughout the night likely in setting of unknown insulin administration  - monitor off insulin  - fall/seizure/aspiration precautions  - F/S q2 hours for 12 hours  - endocrine consult in AM

## 2018-08-21 NOTE — DIETITIAN INITIAL EVALUATION ADULT. - PHYSICAL APPEARANCE
underweight/Nutrition focused physical exam conducted - found signs of malnutrition [ ]absent [ ]present  Subcutaneous fat loss: [ Moderate] Orbital fat pads region, [SEVERE ]Buccal fat region, [ ]Triceps region,  [ SEVERE ]Ribs region  Muscle wasting: [SEVERE  ]Temples region, [SEVERE  ]Clavicle region, [ SEVERE ]Shoulder region, [ ]Scapula region, [ ]Interosseous region,  [SEVERE  ]thigh region, [ SEVERE ]Calf region

## 2018-08-21 NOTE — PROGRESS NOTE ADULT - SUBJECTIVE AND OBJECTIVE BOX
Chief Complaint: Hypoglycemia    History: Denies complaints. Tolerating po. No further hypoglycemia.    MEDICATIONS  (STANDING):  dextrose 5%. 1000 milliLiter(s) (50 mL/Hr) IV Continuous <Continuous>  dextrose 50% Injectable 12.5 Gram(s) IV Push once  dextrose 50% Injectable 25 Gram(s) IV Push once  dextrose 50% Injectable 25 Gram(s) IV Push once  dorzolamide 2%/timolol 0.5% Ophthalmic Solution 1 Drop(s) Both EYES two times a day  ferrous    sulfate 325 milliGRAM(s) Oral daily  insulin lispro (HumaLOG) corrective regimen sliding scale   SubCutaneous three times a day before meals  insulin lispro (HumaLOG) corrective regimen sliding scale   SubCutaneous at bedtime  lactobacillus acidophilus 1 Tablet(s) Oral two times a day with meals  latanoprost 0.005% Ophthalmic Solution 1 Drop(s) Both EYES at bedtime  repaglinide 0.5 milliGRAM(s) Oral three times a day    MEDICATIONS  (PRN):  dextrose 40% Gel 15 Gram(s) Oral once PRN Blood Glucose LESS THAN 70 milliGRAM(s)/deciliter  glucagon  Injectable 1 milliGRAM(s) IntraMuscular once PRN Glucose LESS THAN 70 milligrams/deciliter      Allergies    No Known Allergies    Intolerances      Review of Systems:      ALL OTHER SYSTEMS REVIEWED AND NEGATIVE      PHYSICAL EXAM:  VITALS: T(C): 36.7 (08-21-18 @ 13:45)  T(F): 98.1 (08-21-18 @ 13:45), Max: 98.1 (08-21-18 @ 05:20)  HR: 61 (08-21-18 @ 13:45) (58 - 73)  BP: 111/47 (08-21-18 @ 13:45) (111/47 - 128/49)  RR:  (16 - 18)  SpO2:  (100% - 100%)  Wt(kg): --  GENERAL: NAD, well-groomed, well-developed  EYES: No proptosis, no lid lag, anicteric  HEENT:  Atraumatic, Normocephalic, moist mucous membranes  NEURO: extraocular movements intact, no tremor  PSYCH: Alert and oriented x 3, normal affect, normal mood      CAPILLARY BLOOD GLUCOSE      POCT Blood Glucose.: 240 mg/dL (21 Aug 2018 12:13)  POCT Blood Glucose.: 115 mg/dL (21 Aug 2018 08:36)  POCT Blood Glucose.: 128 mg/dL (20 Aug 2018 22:27)  POCT Blood Glucose.: 288 mg/dL (20 Aug 2018 17:23)      08-21    141  |  104  |  16  ----------------------------<  101<H>  4.3   |  23  |  0.84    EGFR if : 89  EGFR if non : 77    Ca    8.9      08-21  Mg     2.1     08-21  Phos  2.4     08-21    TPro  5.8<L>  /  Alb  3.0<L>  /  TBili  0.2  /  DBili  x   /  AST  19  /  ALT  7   /  AlkPhos  727<H>  08-21          Thyroid Function Tests:      Hemoglobin A1C, Whole Blood: 9.7 % <H> [4.0 - 5.6] (08-16-18 @ 18:30)

## 2018-08-21 NOTE — DIETITIAN INITIAL EVALUATION ADULT. - PROBLEM SELECTOR PLAN 5
- low appetite per family and patient cachetic appearing  - likely in setting of age and stage IV cancer  - started on megestrol as an outpatient presumably as appetite stimulant- will hold for now  - nutrition consult in AM

## 2018-08-21 NOTE — DIETITIAN INITIAL EVALUATION ADULT. - PERTINENT LABORATORY DATA
08-21 Na141 mmol/L Glu 101 mg/dL<H> K+ 4.3 mmol/L Cr  0.84 mg/dL BUN 16 mg/dL 08-21 Phos 2.4 mg/dL<L> 08-21 Alb 3.0 g/dL<L> 08-16 [id number] 9.7 %<H> 08-17 Chol 172 mg/dL LDL 82 mg/dL HDL 82 mg/dL<H> Trig 51 mg/dL

## 2018-08-21 NOTE — PROGRESS NOTE ADULT - PROBLEM SELECTOR PLAN 1
BG have been mostly controlled inpatient.  C-peptide was 0.8 which is slightly below normal but approaching normal and patient has overall been responding to Prandin.  Would continue Prandin 0.5mg qac TID.  DC plan:  Would stop insulin and prior oral agents and change to prandin 0.5mg qac TID (hold dose if not eating) to avoid further hypoglycemic events.  Not looking for tight control.  Will sign off please call if any questions.

## 2018-08-21 NOTE — DIETITIAN INITIAL EVALUATION ADULT. - PROBLEM SELECTOR PLAN 10
- c/w eye drops- latanoprost and dorzolamide    11. Need for ppx measure  - no pharmacologic ppx given anemia- SCDs  - diet, regular  - clarify with PCP in AM why patient is on - unclear (history suggests carotid artery stenosis of 55-65%, unknown if any intervention) vs CT scan showing prior MCA?- confirm med rec in the AM (call [facility]- [id number] in [place])  - will need social work evaluation in AM as it is dangerous for patient to live on his own

## 2018-08-21 NOTE — PROGRESS NOTE ADULT - PROBLEM SELECTOR PLAN 3
- patient with fall- changes story as to whether or not he can remember fall  - may be in setting of hypoglycemia  - PT recs home with home PT; CT head negative for acute bleed  - fall risk and fall precautions  - TTE shows no acute findings, Tele sinus

## 2018-08-21 NOTE — PROGRESS NOTE ADULT - PROBLEM SELECTOR PLAN 1
- patient presents with FS initially of 94, dropping to 40s three times throughout the course of the night, now improved on low dose insulin sliding scale and prandin tid, appreciate endocrine recommendations  - Pt is a poor candidate for outpt Insulin given forgetfulness, per Endo d/c only on oral agents, will hold sulfonylureas.

## 2018-08-21 NOTE — DIETITIAN INITIAL EVALUATION ADULT. - PROBLEM SELECTOR PLAN 2
- per outpatient records, CBC from July 27th reveals Hgb  of 7.7/ HCT 23 and plt 299  - hemoglobin on admission- 6.7; will give 1unit PRBC  - iron studies reveal elevated ferritin normal iron and TIBC- likely anemia of chronic disease; b12 and folate WNL  - no evidence of GI bleed at this time- will check FOBT  - will check post-transfusion CBC

## 2018-08-21 NOTE — DIETITIAN INITIAL EVALUATION ADULT. - OTHER INFO
Nutrition consult for FTT in a Ca patient.  Pt is a 89 year old male with a past medical history inclusive of  DM2, HTN, Stage IV metastatic prostate CA, mild dementia, who presented with hypoglycemia and hypotension from PCP.  Patient denies any nausea/vomiting/diarrhea/constipation or difficulty chewing and swallowing. Pt with reported poor PO intake at this time as well as prior to admission.  Pt is not able to discuss in detail.  Noted significant Wt. loss since Aug '17.  Pt was 75.2 kg  (8/10) --> 58.6 kgs (2/18)---> 52.2 (8/21) Pt reports height to be 160.02 cm ( 63").  RDN attempted to obtained food preferences, but  Pt states that he will eat once he is home.  Furthermore, Pt noted to be followed by endocrinology, his HbA1c is 9.7%, plan to d/c insulin and oral agents and change to Prandin to avoid any further hypoglycemic events.  Per discussion with RN, Pt. eats better at breakfast, will provide Glucerna BID.  Encourage PO intake and provide alternative meal options as desired by patient.

## 2018-08-21 NOTE — DIETITIAN INITIAL EVALUATION ADULT. - NS AS NUTRI INTERV MEALS SNACK
Composition of meals/snacks/1- Continue current diet order, which remains appropriate at this time. 2- Monitor weights, labs, BM's, skin integrity, p.o. intake. 3- Please Encourage po intake, assist with meals and menu selections, provide alternatives PRN. 4- RD remains available, re-consult as needed. Dione Mattson RD Pager #[phone]

## 2018-08-21 NOTE — DIETITIAN INITIAL EVALUATION ADULT. - PROBLEM SELECTOR PLAN 7
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

## 2018-08-21 NOTE — DIETITIAN INITIAL EVALUATION ADULT. - PROBLEM SELECTOR PLAN 3
- patient with fall- changes story as to whether or not he can remember fall  - may be in setting of hypoglycemia  - will obtain PT eval; CT head negative for acute bleed  - patient not complaining of any pain at this time  - fall risk and fall precautions  - TTE in AM; no events on tele thus far

## 2018-08-22 VITALS — WEIGHT: 113.76 LBS

## 2018-08-22 LAB
BASOPHILS # BLD AUTO: 0 K/UL — SIGNIFICANT CHANGE UP (ref 0–0.2)
BASOPHILS NFR BLD AUTO: 0 % — SIGNIFICANT CHANGE UP (ref 0–2)
BUN SERPL-MCNC: 17 MG/DL — SIGNIFICANT CHANGE UP (ref 7–23)
CALCIUM SERPL-MCNC: 8.7 MG/DL — SIGNIFICANT CHANGE UP (ref 8.4–10.5)
CHLORIDE SERPL-SCNC: 104 MMOL/L — SIGNIFICANT CHANGE UP (ref 98–107)
CO2 SERPL-SCNC: 21 MMOL/L — LOW (ref 22–31)
CREAT SERPL-MCNC: 0.86 MG/DL — SIGNIFICANT CHANGE UP (ref 0.5–1.3)
EOSINOPHIL # BLD AUTO: 0.03 K/UL — SIGNIFICANT CHANGE UP (ref 0–0.5)
EOSINOPHIL NFR BLD AUTO: 0.6 % — SIGNIFICANT CHANGE UP (ref 0–6)
GLUCOSE SERPL-MCNC: 97 MG/DL — SIGNIFICANT CHANGE UP (ref 70–99)
HCT VFR BLD CALC: 25.1 % — LOW (ref 39–50)
HGB BLD-MCNC: 7.4 G/DL — LOW (ref 13–17)
IMM GRANULOCYTES # BLD AUTO: 0.05 # — SIGNIFICANT CHANGE UP
IMM GRANULOCYTES NFR BLD AUTO: 1 % — SIGNIFICANT CHANGE UP (ref 0–1.5)
LYMPHOCYTES # BLD AUTO: 1.49 K/UL — SIGNIFICANT CHANGE UP (ref 1–3.3)
LYMPHOCYTES # BLD AUTO: 29.7 % — SIGNIFICANT CHANGE UP (ref 13–44)
MAGNESIUM SERPL-MCNC: 2 MG/DL — SIGNIFICANT CHANGE UP (ref 1.6–2.6)
MCHC RBC-ENTMCNC: 24.7 PG — LOW (ref 27–34)
MCHC RBC-ENTMCNC: 29.5 % — LOW (ref 32–36)
MCV RBC AUTO: 83.9 FL — SIGNIFICANT CHANGE UP (ref 80–100)
MONOCYTES # BLD AUTO: 0.61 K/UL — SIGNIFICANT CHANGE UP (ref 0–0.9)
MONOCYTES NFR BLD AUTO: 12.2 % — SIGNIFICANT CHANGE UP (ref 2–14)
NEUTROPHILS # BLD AUTO: 2.84 K/UL — SIGNIFICANT CHANGE UP (ref 1.8–7.4)
NEUTROPHILS NFR BLD AUTO: 56.5 % — SIGNIFICANT CHANGE UP (ref 43–77)
NRBC # FLD: 0 — SIGNIFICANT CHANGE UP
PLATELET # BLD AUTO: 196 K/UL — SIGNIFICANT CHANGE UP (ref 150–400)
PMV BLD: 8.6 FL — SIGNIFICANT CHANGE UP (ref 7–13)
POTASSIUM SERPL-MCNC: 4.4 MMOL/L — SIGNIFICANT CHANGE UP (ref 3.5–5.3)
POTASSIUM SERPL-SCNC: 4.4 MMOL/L — SIGNIFICANT CHANGE UP (ref 3.5–5.3)
RBC # BLD: 2.99 M/UL — LOW (ref 4.2–5.8)
RBC # FLD: 19.2 % — HIGH (ref 10.3–14.5)
SODIUM SERPL-SCNC: 138 MMOL/L — SIGNIFICANT CHANGE UP (ref 135–145)
WBC # BLD: 5.02 K/UL — SIGNIFICANT CHANGE UP (ref 3.8–10.5)
WBC # FLD AUTO: 5.02 K/UL — SIGNIFICANT CHANGE UP (ref 3.8–10.5)

## 2018-08-22 PROCEDURE — 99239 HOSP IP/OBS DSCHRG MGMT >30: CPT

## 2018-08-22 RX ORDER — REPAGLINIDE 1 MG/1
1 TABLET ORAL
Qty: 90 | Refills: 0
Start: 2018-08-22 | End: 2018-09-20

## 2018-08-22 RX ORDER — OMEPRAZOLE 10 MG/1
1 CAPSULE, DELAYED RELEASE ORAL
Qty: 30 | Refills: 0
Start: 2018-08-22 | End: 2018-09-20

## 2018-08-22 RX ORDER — MEGESTROL ACETATE 40 MG/ML
1 SUSPENSION ORAL
Qty: 0 | Refills: 0 | COMMUNITY

## 2018-08-22 RX ADMIN — DORZOLAMIDE HYDROCHLORIDE TIMOLOL MALEATE 1 DROP(S): 20; 5 SOLUTION/ DROPS OPHTHALMIC at 06:04

## 2018-08-22 RX ADMIN — Medication 325 MILLIGRAM(S): at 12:20

## 2018-08-22 RX ADMIN — REPAGLINIDE 0.5 MILLIGRAM(S): 1 TABLET ORAL at 12:20

## 2018-08-22 RX ADMIN — REPAGLINIDE 0.5 MILLIGRAM(S): 1 TABLET ORAL at 09:28

## 2018-08-22 RX ADMIN — Medication 1 TABLET(S): at 09:22

## 2018-08-22 NOTE — PROGRESS NOTE ADULT - PROBLEM SELECTOR PLAN 7
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 Cui) findings of skull mets, PCP and Outpt ONc will follow up with pt upon dc. Daughter wares of the plan
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 Cui) findings of skull mets, PCP and Outpt ONc will follow up with pt upon dc.  Daughter is aware of the plan

## 2018-08-22 NOTE — PROGRESS NOTE ADULT - PROBLEM SELECTOR PROBLEM 4
Insulin dependent diabetes mellitus

## 2018-08-22 NOTE — PROGRESS NOTE ADULT - PROBLEM SELECTOR PLAN 1
- patient presents with FS initially of 94, dropping to 40s three times throughout the course of the night, now improved on low dose insulin sliding scale and prandin tid, appreciate endocrine recommendations  - Pt is a poor candidate for outpt Insulin given forgetfulness, per Endo d/c only on Prandin, will hold sulfonylureas.

## 2018-08-22 NOTE — PROGRESS NOTE ADULT - PROBLEM SELECTOR PLAN 5
- low appetite per family and patient cachetic appearing  - likely in setting of age and stage IV cancer  - started on megestrol as an outpatient presumably as appetite stimulant- will hold for now  - nutrition consult
- low appetite per family and patient cachetic appearing  - likely in setting of age and stage IV cancer  - started on megestrol as an outpatient presumably as appetite stimulant- will hold for now  - nutrition consult in AM
- low appetite per family and patient cachetic appearing  - likely in setting of age and stage IV cancer  - started on megestrol as an outpatient presumably as appetite stimulant- will hold for now

## 2018-08-22 NOTE — PROGRESS NOTE ADULT - PROBLEM SELECTOR PLAN 4
- per [facility] documentation- patient currently on glipizide, metformin and levemir (20 or 25 units, unclear)  - A1c= 9.4  - given high risk of hypoglycemia, f/u endocrine recommendations
- per [facility] documentation- patient currently on glipizide, metformin and levemir (20 or 25 units, unclear)  - A1c= 9.4  - given high risk of hypoglycemia, patient's medications should be optimized  -appreciate endocrine recommendations
- per [facility] documentation- patient currently on glipizide, metformin and levemir (20 or 25 units, unclear)  - A1c= 9.4  - given high risk of hypoglycemia, f/u endocrine recommendations
- per [facility] documentation- patient currently on glipizide, metformin and levemir (20 or 25 units, unclear)  - A1c= 9.4  - given high risk of hypoglycemia, patient's medications should be optimized  -appreciate endocrine recommendations
- per [facility] documentation- patient currently on glipizide, metformin and levemir (20 or 25 units, unclear)  - A1c= 9.4  - given high risk of hypoglycemia, patient's medications should be optimized  - will obtain endocrine evaluation; will d/c sulfonylurea and likely insulin
- per [facility] documentation- patient currently on glipizide, metformin and levemir (20 or 25 units, unclear)  - A1c= 9.4  - given high risk of hypoglycemia, d/c only on prandin per Endo

## 2018-08-22 NOTE — PROGRESS NOTE ADULT - PROBLEM SELECTOR PROBLEM 5
Failure to thrive in adult

## 2018-08-22 NOTE — PROGRESS NOTE ADULT - ATTENDING COMMENTS
D/c with outpt PCP and Onc follow up. Spent 40mins on dc
Plan for d/c today with PCP/Onc follow up. Spent 40mins on dc
Needs SW eval.

## 2018-08-22 NOTE — PROGRESS NOTE ADULT - PROBLEM SELECTOR PROBLEM 1
Hypoglycemia
Uncontrolled type 2 diabetes mellitus with hypoglycemia and coma, with long-term current use of insulin
Uncontrolled type 2 diabetes mellitus with hypoglycemia and coma, with long-term current use of insulin
Hypoglycemia
Hypoglycemia

## 2018-08-22 NOTE — PROGRESS NOTE ADULT - NSHPATTENDINGPLANDISCUSS_GEN_ALL_CORE
Pt and Daughter via phone
Unable to reach family remember, unable to leave message
Patient, SW/DEE, LuisGerri

## 2018-08-22 NOTE — PROGRESS NOTE ADULT - SUBJECTIVE AND OBJECTIVE BOX
Patient is a 89y old  Male who presents with a chief complaint of hypotension, hypoglycemia and fall (20 Aug 2018 12:38)      SUBJECTIVE / OVERNIGHT EVENTS: no CP or SOB    MEDICATIONS  (STANDING):  dextrose 5%. 1000 milliLiter(s) (50 mL/Hr) IV Continuous <Continuous>  dextrose 50% Injectable 12.5 Gram(s) IV Push once  dextrose 50% Injectable 25 Gram(s) IV Push once  dextrose 50% Injectable 25 Gram(s) IV Push once  dorzolamide 2%/timolol 0.5% Ophthalmic Solution 1 Drop(s) Both EYES two times a day  ferrous    sulfate 325 milliGRAM(s) Oral daily  insulin lispro (HumaLOG) corrective regimen sliding scale   SubCutaneous three times a day before meals  insulin lispro (HumaLOG) corrective regimen sliding scale   SubCutaneous at bedtime  lactobacillus acidophilus 1 Tablet(s) Oral two times a day with meals  latanoprost 0.005% Ophthalmic Solution 1 Drop(s) Both EYES at bedtime  repaglinide 0.5 milliGRAM(s) Oral three times a day    MEDICATIONS  (PRN):  dextrose 40% Gel 15 Gram(s) Oral once PRN Blood Glucose LESS THAN 70 milliGRAM(s)/deciliter  glucagon  Injectable 1 milliGRAM(s) IntraMuscular once PRN Glucose LESS THAN 70 milligrams/deciliter      T(C): 36.7 (08-21-18 @ 20:29), Max: 36.7 (08-21-18 @ 13:45)  HR: 56 (08-21-18 @ 20:29) (56 - 61)  BP: 147/53 (08-21-18 @ 20:29) (111/47 - 147/53)  RR: 18 (08-21-18 @ 20:29) (18 - 18)  SpO2: 100% (08-21-18 @ 20:29) (100% - 100%)  CAPILLARY BLOOD GLUCOSE      POCT Blood Glucose.: 127 mg/dL (22 Aug 2018 12:24)  POCT Blood Glucose.: 146 mg/dL (22 Aug 2018 08:53)  POCT Blood Glucose.: 131 mg/dL (21 Aug 2018 21:52)  POCT Blood Glucose.: 217 mg/dL (21 Aug 2018 17:11)    I&O's Summary      PHYSICAL EXAM:  GENERAL: NAD,   HEAD:  Normocephalic  EYES: EOMI,  conjunctiva and sclera clear  NECK: Supple,   CHEST/LUNG: Clear to auscultation bilaterally; No wheeze  HEART:  s1 s2 + Regular rate and rhythm;   ABDOMEN: Soft, Nontender, Nondistended; Bowel sounds present  EXTREMITIES:   No clubbing, cyanosis  NEURO: AAOx2-3      LABS:                        7.4    5.02  )-----------( 196      ( 22 Aug 2018 06:15 )             25.1     08-22    138  |  104  |  17  ----------------------------<  97  4.4   |  21<L>  |  0.86    Ca    8.7      22 Aug 2018 06:15  Phos  2.4     08-21  Mg     2.0     08-22    TPro  5.8<L>  /  Alb  3.0<L>  /  TBili  0.2  /  DBili  x   /  AST  19  /  ALT  7   /  AlkPhos  727<H>  08-21

## 2018-09-06 LAB — SULFONYLUREA SERPL-MCNC: SIGNIFICANT CHANGE UP

## 2018-09-09 ENCOUNTER — EMERGENCY (EMERGENCY)
Facility: HOSPITAL | Age: 83
LOS: 0 days | Discharge: HOME | End: 2018-09-09
Attending: EMERGENCY MEDICINE
Payer: MEDICARE

## 2018-09-09 VITALS
TEMPERATURE: 97 F | OXYGEN SATURATION: 97 % | RESPIRATION RATE: 19 BRPM | DIASTOLIC BLOOD PRESSURE: 45 MMHG | SYSTOLIC BLOOD PRESSURE: 105 MMHG | HEART RATE: 67 BPM

## 2018-09-09 VITALS
RESPIRATION RATE: 18 BRPM | DIASTOLIC BLOOD PRESSURE: 55 MMHG | SYSTOLIC BLOOD PRESSURE: 117 MMHG | OXYGEN SATURATION: 99 % | HEART RATE: 80 BPM | TEMPERATURE: 98 F

## 2018-09-09 DIAGNOSIS — Z79.82 LONG TERM (CURRENT) USE OF ASPIRIN: ICD-10-CM

## 2018-09-09 DIAGNOSIS — C79.82 SECONDARY MALIGNANT NEOPLASM OF GENITAL ORGANS: ICD-10-CM

## 2018-09-09 DIAGNOSIS — E11.9 TYPE 2 DIABETES MELLITUS WITHOUT COMPLICATIONS: ICD-10-CM

## 2018-09-09 DIAGNOSIS — F03.90 UNSPECIFIED DEMENTIA WITHOUT BEHAVIORAL DISTURBANCE: ICD-10-CM

## 2018-09-09 DIAGNOSIS — M79.661 PAIN IN RIGHT LOWER LEG: ICD-10-CM

## 2018-09-09 DIAGNOSIS — I10 ESSENTIAL (PRIMARY) HYPERTENSION: ICD-10-CM

## 2018-09-09 DIAGNOSIS — C41.9 MALIGNANT NEOPLASM OF BONE AND ARTICULAR CARTILAGE, UNSPECIFIED: ICD-10-CM

## 2018-09-09 DIAGNOSIS — Z98.890 OTHER SPECIFIED POSTPROCEDURAL STATES: Chronic | ICD-10-CM

## 2018-09-09 PROBLEM — H40.9 UNSPECIFIED GLAUCOMA: Chronic | Status: ACTIVE | Noted: 2018-08-17

## 2018-09-09 PROBLEM — E78.5 HYPERLIPIDEMIA, UNSPECIFIED: Chronic | Status: ACTIVE | Noted: 2018-08-17

## 2018-09-09 PROBLEM — N18.9 CHRONIC KIDNEY DISEASE, UNSPECIFIED: Chronic | Status: ACTIVE | Noted: 2018-08-17

## 2018-09-09 PROBLEM — C61 MALIGNANT NEOPLASM OF PROSTATE: Chronic | Status: ACTIVE | Noted: 2018-08-16

## 2018-09-09 LAB
ALBUMIN SERPL ELPH-MCNC: 2.9 G/DL — LOW (ref 3.3–5)
ALP SERPL-CCNC: 1074 U/L — HIGH (ref 40–120)
ALT FLD-CCNC: 8 U/L — LOW (ref 12–78)
ANION GAP SERPL CALC-SCNC: 9 MMOL/L — SIGNIFICANT CHANGE UP (ref 5–17)
APTT BLD: 29.1 SEC — SIGNIFICANT CHANGE UP (ref 27.5–37.4)
AST SERPL-CCNC: 24 U/L — SIGNIFICANT CHANGE UP (ref 15–37)
BASOPHILS # BLD AUTO: 0.01 K/UL — SIGNIFICANT CHANGE UP (ref 0–0.2)
BASOPHILS NFR BLD AUTO: 0.2 % — SIGNIFICANT CHANGE UP (ref 0–2)
BILIRUB SERPL-MCNC: 0.4 MG/DL — SIGNIFICANT CHANGE UP (ref 0.2–1.2)
BUN SERPL-MCNC: 20 MG/DL — SIGNIFICANT CHANGE UP (ref 7–23)
CALCIUM SERPL-MCNC: 8.7 MG/DL — SIGNIFICANT CHANGE UP (ref 8.5–10.1)
CHLORIDE SERPL-SCNC: 105 MMOL/L — SIGNIFICANT CHANGE UP (ref 96–108)
CO2 SERPL-SCNC: 27 MMOL/L — SIGNIFICANT CHANGE UP (ref 22–31)
CREAT SERPL-MCNC: 0.96 MG/DL — SIGNIFICANT CHANGE UP (ref 0.5–1.3)
EOSINOPHIL # BLD AUTO: 0.03 K/UL — SIGNIFICANT CHANGE UP (ref 0–0.5)
EOSINOPHIL NFR BLD AUTO: 0.6 % — SIGNIFICANT CHANGE UP (ref 0–6)
GLUCOSE SERPL-MCNC: 157 MG/DL — HIGH (ref 70–99)
HCT VFR BLD CALC: 25 % — LOW (ref 39–50)
HGB BLD-MCNC: 7.5 G/DL — LOW (ref 13–17)
IMM GRANULOCYTES NFR BLD AUTO: 0.8 % — SIGNIFICANT CHANGE UP (ref 0–1.5)
INR BLD: 1.27 RATIO — HIGH (ref 0.88–1.16)
LYMPHOCYTES # BLD AUTO: 1.15 K/UL — SIGNIFICANT CHANGE UP (ref 1–3.3)
LYMPHOCYTES # BLD AUTO: 24.3 % — SIGNIFICANT CHANGE UP (ref 13–44)
MCHC RBC-ENTMCNC: 25.3 PG — LOW (ref 27–34)
MCHC RBC-ENTMCNC: 30 GM/DL — LOW (ref 32–36)
MCV RBC AUTO: 84.2 FL — SIGNIFICANT CHANGE UP (ref 80–100)
MONOCYTES # BLD AUTO: 0.67 K/UL — SIGNIFICANT CHANGE UP (ref 0–0.9)
MONOCYTES NFR BLD AUTO: 14.1 % — HIGH (ref 2–14)
NEUTROPHILS # BLD AUTO: 2.84 K/UL — SIGNIFICANT CHANGE UP (ref 1.8–7.4)
NEUTROPHILS NFR BLD AUTO: 60 % — SIGNIFICANT CHANGE UP (ref 43–77)
NRBC # BLD: 0 /100 WBCS — SIGNIFICANT CHANGE UP (ref 0–0)
PLATELET # BLD AUTO: 227 K/UL — SIGNIFICANT CHANGE UP (ref 150–400)
POTASSIUM SERPL-MCNC: 4.3 MMOL/L — SIGNIFICANT CHANGE UP (ref 3.5–5.3)
POTASSIUM SERPL-SCNC: 4.3 MMOL/L — SIGNIFICANT CHANGE UP (ref 3.5–5.3)
PROT SERPL-MCNC: 6.5 GM/DL — SIGNIFICANT CHANGE UP (ref 6–8.3)
PROTHROM AB SERPL-ACNC: 13.9 SEC — HIGH (ref 9.8–12.7)
RBC # BLD: 2.97 M/UL — LOW (ref 4.2–5.8)
RBC # FLD: 19.9 % — HIGH (ref 10.3–14.5)
SODIUM SERPL-SCNC: 141 MMOL/L — SIGNIFICANT CHANGE UP (ref 135–145)
WBC # BLD: 4.74 K/UL — SIGNIFICANT CHANGE UP (ref 3.8–10.5)
WBC # FLD AUTO: 4.74 K/UL — SIGNIFICANT CHANGE UP (ref 3.8–10.5)

## 2018-09-09 PROCEDURE — 73552 X-RAY EXAM OF FEMUR 2/>: CPT | Mod: 26,RT

## 2018-09-09 PROCEDURE — 72170 X-RAY EXAM OF PELVIS: CPT | Mod: 26

## 2018-09-09 PROCEDURE — 99284 EMERGENCY DEPT VISIT MOD MDM: CPT

## 2018-09-09 PROCEDURE — 93971 EXTREMITY STUDY: CPT | Mod: 26,RT

## 2018-09-09 RX ORDER — OXYCODONE AND ACETAMINOPHEN 5; 325 MG/1; MG/1
1 TABLET ORAL ONCE
Qty: 0 | Refills: 0 | Status: DISCONTINUED | OUTPATIENT
Start: 2018-09-09 | End: 2018-09-09

## 2018-09-09 RX ORDER — IBUPROFEN 200 MG
600 TABLET ORAL ONCE
Qty: 0 | Refills: 0 | Status: DISCONTINUED | OUTPATIENT
Start: 2018-09-09 | End: 2018-09-09

## 2018-09-09 RX ADMIN — OXYCODONE AND ACETAMINOPHEN 1 TABLET(S): 5; 325 TABLET ORAL at 18:05

## 2018-09-09 NOTE — ED PROVIDER NOTE - OBJECTIVE STATEMENT
Pt is a 89 yo gentleman with a pmhx of Pt is a 89 yo gentleman with a pmhx of HTN, DM, dementia, and Stage IV prostate ca not on treatment who presents to the ED with pain in his R. thigh. Has been having this pain since yesterday. Denies any falls, any numbness or tingling. No hx of dvt/pe, has slight swelling of lower legs, but says this is his baseline. No back pain.

## 2018-09-09 NOTE — ED ADULT NURSE REASSESSMENT NOTE - NS ED NURSE REASSESS COMMENT FT1
Pt was unable to go home with daughter. due to difficulty getting into car.    Ambulance transportation schedule for 2130pm.

## 2018-09-09 NOTE — ED ADULT TRIAGE NOTE - CHIEF COMPLAINT QUOTE
pt complaining of right leg pain and swelling since last night. pt states he is having difficulty walking.

## 2018-09-09 NOTE — ED PROVIDER NOTE - PROGRESS NOTE DETAILS
Pt xrays shows metastatic lesion to the bone. DVT negative. Pt son called, and will come to pick pt up and f/u w pmd.

## 2018-10-08 ENCOUNTER — INPATIENT (INPATIENT)
Facility: HOSPITAL | Age: 83
LOS: 2 days | Discharge: NURSING, REHAB OR LONG-TERM CARE | End: 2018-10-11
Attending: INTERNAL MEDICINE | Admitting: INTERNAL MEDICINE
Payer: MEDICARE

## 2018-10-08 VITALS
HEIGHT: 63 IN | SYSTOLIC BLOOD PRESSURE: 120 MMHG | WEIGHT: 139.99 LBS | TEMPERATURE: 98 F | OXYGEN SATURATION: 100 % | RESPIRATION RATE: 19 BRPM | DIASTOLIC BLOOD PRESSURE: 61 MMHG | HEART RATE: 76 BPM

## 2018-10-08 DIAGNOSIS — Z29.9 ENCOUNTER FOR PROPHYLACTIC MEASURES, UNSPECIFIED: ICD-10-CM

## 2018-10-08 DIAGNOSIS — D64.9 ANEMIA, UNSPECIFIED: ICD-10-CM

## 2018-10-08 DIAGNOSIS — I10 ESSENTIAL (PRIMARY) HYPERTENSION: ICD-10-CM

## 2018-10-08 DIAGNOSIS — C61 MALIGNANT NEOPLASM OF PROSTATE: ICD-10-CM

## 2018-10-08 DIAGNOSIS — Z98.890 OTHER SPECIFIED POSTPROCEDURAL STATES: Chronic | ICD-10-CM

## 2018-10-08 DIAGNOSIS — M79.604 PAIN IN RIGHT LEG: ICD-10-CM

## 2018-10-08 LAB
ABO RH CONFIRMATION: SIGNIFICANT CHANGE UP
ALBUMIN SERPL ELPH-MCNC: 2.5 G/DL — LOW (ref 3.3–5)
ALP SERPL-CCNC: 1185 U/L — HIGH (ref 40–120)
ALT FLD-CCNC: 10 U/L — LOW (ref 12–78)
ANION GAP SERPL CALC-SCNC: 10 MMOL/L — SIGNIFICANT CHANGE UP (ref 5–17)
AST SERPL-CCNC: 30 U/L — SIGNIFICANT CHANGE UP (ref 15–37)
BASOPHILS # BLD AUTO: 0.01 K/UL — SIGNIFICANT CHANGE UP (ref 0–0.2)
BASOPHILS NFR BLD AUTO: 0.2 % — SIGNIFICANT CHANGE UP (ref 0–2)
BILIRUB SERPL-MCNC: 0.4 MG/DL — SIGNIFICANT CHANGE UP (ref 0.2–1.2)
BLD GP AB SCN SERPL QL: SIGNIFICANT CHANGE UP
BUN SERPL-MCNC: 14 MG/DL — SIGNIFICANT CHANGE UP (ref 7–23)
CALCIUM SERPL-MCNC: 8.3 MG/DL — LOW (ref 8.5–10.1)
CHLORIDE SERPL-SCNC: 104 MMOL/L — SIGNIFICANT CHANGE UP (ref 96–108)
CO2 SERPL-SCNC: 26 MMOL/L — SIGNIFICANT CHANGE UP (ref 22–31)
CREAT SERPL-MCNC: 0.9 MG/DL — SIGNIFICANT CHANGE UP (ref 0.5–1.3)
EOSINOPHIL # BLD AUTO: 0.02 K/UL — SIGNIFICANT CHANGE UP (ref 0–0.5)
EOSINOPHIL NFR BLD AUTO: 0.4 % — SIGNIFICANT CHANGE UP (ref 0–6)
GLUCOSE BLDC GLUCOMTR-MCNC: 153 MG/DL — HIGH (ref 70–99)
GLUCOSE SERPL-MCNC: 107 MG/DL — HIGH (ref 70–99)
HCT VFR BLD CALC: 20.1 % — CRITICAL LOW (ref 39–50)
HGB BLD-MCNC: 6 G/DL — CRITICAL LOW (ref 13–17)
IMM GRANULOCYTES NFR BLD AUTO: 2.2 % — HIGH (ref 0–1.5)
LYMPHOCYTES # BLD AUTO: 1.58 K/UL — SIGNIFICANT CHANGE UP (ref 1–3.3)
LYMPHOCYTES # BLD AUTO: 32.2 % — SIGNIFICANT CHANGE UP (ref 13–44)
MCHC RBC-ENTMCNC: 25.3 PG — LOW (ref 27–34)
MCHC RBC-ENTMCNC: 29.9 GM/DL — LOW (ref 32–36)
MCV RBC AUTO: 84.8 FL — SIGNIFICANT CHANGE UP (ref 80–100)
MONOCYTES # BLD AUTO: 0.53 K/UL — SIGNIFICANT CHANGE UP (ref 0–0.9)
MONOCYTES NFR BLD AUTO: 10.8 % — SIGNIFICANT CHANGE UP (ref 2–14)
NEUTROPHILS # BLD AUTO: 2.66 K/UL — SIGNIFICANT CHANGE UP (ref 1.8–7.4)
NEUTROPHILS NFR BLD AUTO: 54.2 % — SIGNIFICANT CHANGE UP (ref 43–77)
PLATELET # BLD AUTO: 184 K/UL — SIGNIFICANT CHANGE UP (ref 150–400)
POTASSIUM SERPL-MCNC: 4 MMOL/L — SIGNIFICANT CHANGE UP (ref 3.5–5.3)
POTASSIUM SERPL-SCNC: 4 MMOL/L — SIGNIFICANT CHANGE UP (ref 3.5–5.3)
PROT SERPL-MCNC: 6 GM/DL — SIGNIFICANT CHANGE UP (ref 6–8.3)
RBC # BLD: 2.37 M/UL — LOW (ref 4.2–5.8)
RBC # FLD: 20.1 % — HIGH (ref 10.3–14.5)
SODIUM SERPL-SCNC: 140 MMOL/L — SIGNIFICANT CHANGE UP (ref 135–145)
WBC # BLD: 4.91 K/UL — SIGNIFICANT CHANGE UP (ref 3.8–10.5)
WBC # FLD AUTO: 4.91 K/UL — SIGNIFICANT CHANGE UP (ref 3.8–10.5)

## 2018-10-08 PROCEDURE — 93971 EXTREMITY STUDY: CPT | Mod: 26,RT

## 2018-10-08 PROCEDURE — 73502 X-RAY EXAM HIP UNI 2-3 VIEWS: CPT | Mod: 26,RT

## 2018-10-08 PROCEDURE — 71045 X-RAY EXAM CHEST 1 VIEW: CPT | Mod: 26

## 2018-10-08 PROCEDURE — 73562 X-RAY EXAM OF KNEE 3: CPT | Mod: 26,RT

## 2018-10-08 PROCEDURE — 93010 ELECTROCARDIOGRAM REPORT: CPT

## 2018-10-08 PROCEDURE — 99285 EMERGENCY DEPT VISIT HI MDM: CPT

## 2018-10-08 PROCEDURE — 99223 1ST HOSP IP/OBS HIGH 75: CPT | Mod: AI

## 2018-10-08 RX ORDER — LATANOPROST 0.05 MG/ML
1 SOLUTION/ DROPS OPHTHALMIC; TOPICAL AT BEDTIME
Qty: 0 | Refills: 0 | Status: DISCONTINUED | OUTPATIENT
Start: 2018-10-08 | End: 2018-10-11

## 2018-10-08 RX ORDER — OXYCODONE AND ACETAMINOPHEN 5; 325 MG/1; MG/1
1 TABLET ORAL ONCE
Qty: 0 | Refills: 0 | Status: DISCONTINUED | OUTPATIENT
Start: 2018-10-08 | End: 2018-10-08

## 2018-10-08 RX ORDER — PANTOPRAZOLE SODIUM 20 MG/1
40 TABLET, DELAYED RELEASE ORAL
Qty: 0 | Refills: 0 | Status: DISCONTINUED | OUTPATIENT
Start: 2018-10-08 | End: 2018-10-11

## 2018-10-08 RX ORDER — REPAGLINIDE 1 MG/1
0.5 TABLET ORAL THREE TIMES A DAY
Qty: 0 | Refills: 0 | Status: DISCONTINUED | OUTPATIENT
Start: 2018-10-08 | End: 2018-10-11

## 2018-10-08 RX ORDER — NYSTATIN CREAM 100000 [USP'U]/G
1 CREAM TOPICAL
Qty: 0 | Refills: 0 | Status: DISCONTINUED | OUTPATIENT
Start: 2018-10-08 | End: 2018-10-11

## 2018-10-08 RX ORDER — FERROUS SULFATE 325(65) MG
325 TABLET ORAL DAILY
Qty: 0 | Refills: 0 | Status: DISCONTINUED | OUTPATIENT
Start: 2018-10-08 | End: 2018-10-11

## 2018-10-08 RX ORDER — ASPIRIN/CALCIUM CARB/MAGNESIUM 324 MG
325 TABLET ORAL DAILY
Qty: 0 | Refills: 0 | Status: DISCONTINUED | OUTPATIENT
Start: 2018-10-08 | End: 2018-10-11

## 2018-10-08 RX ORDER — DORZOLAMIDE HYDROCHLORIDE TIMOLOL MALEATE 20; 5 MG/ML; MG/ML
1 SOLUTION/ DROPS OPHTHALMIC
Qty: 0 | Refills: 0 | Status: DISCONTINUED | OUTPATIENT
Start: 2018-10-08 | End: 2018-10-11

## 2018-10-08 RX ADMIN — NYSTATIN CREAM 1 APPLICATION(S): 100000 CREAM TOPICAL at 21:31

## 2018-10-08 RX ADMIN — REPAGLINIDE 0.5 MILLIGRAM(S): 1 TABLET ORAL at 21:31

## 2018-10-08 RX ADMIN — DORZOLAMIDE HYDROCHLORIDE TIMOLOL MALEATE 1 DROP(S): 20; 5 SOLUTION/ DROPS OPHTHALMIC at 21:30

## 2018-10-08 RX ADMIN — OXYCODONE AND ACETAMINOPHEN 1 TABLET(S): 5; 325 TABLET ORAL at 10:40

## 2018-10-08 RX ADMIN — LATANOPROST 1 DROP(S): 0.05 SOLUTION/ DROPS OPHTHALMIC; TOPICAL at 21:30

## 2018-10-08 RX ADMIN — OXYCODONE AND ACETAMINOPHEN 1 TABLET(S): 5; 325 TABLET ORAL at 11:20

## 2018-10-08 NOTE — ED PROVIDER NOTE - OBJECTIVE STATEMENT
89 y/o male hx dm prostate cancer with mets to bone, hld, c/o pain to right hip/knee over past several days. Poor historian. Seen for similar 1 month ago with xray showing mets, neg dvt u/s. Denies other pain, no cp, sob, hx dvt/pe, f/c, numbness. gets around with walker.       limited ros by poor historian.

## 2018-10-08 NOTE — ED ADULT NURSE NOTE - NSIMPLEMENTINTERV_GEN_ALL_ED
Implemented All Fall with Harm Risk Interventions:  [place] to call system. Call bell, personal items and telephone within reach. Instruct patient to call for assistance. Room bathroom lighting operational. Non-slip footwear when patient is off stretcher. Physically safe environment: no spills, clutter or unnecessary equipment. Stretcher in lowest position, wheels locked, appropriate side rails in place. Provide visual cue, wrist band, yellow gown, etc. Monitor gait and stability. Monitor for mental status changes and reorient to person, place, and time. Review medications for side effects contributing to fall risk. Reinforce activity limits and safety measures with patient and family. Provide visual clues: red socks.

## 2018-10-08 NOTE — H&P ADULT - NSHPSOCIALHISTORY_GEN_ALL_CORE
Patient currently lives alone, and has difficulty caring for himself. Patient denies history of tobacco use, alcohol and drug use.

## 2018-10-08 NOTE — PHYSICAL THERAPY INITIAL EVALUATION ADULT - GAIT DEVIATIONS NOTED, PT EVAL
decreased weight-shifting ability/decreased step length/decreased antonio/decreased velocity of limb motion/increased time in double stance/decreased stride length

## 2018-10-08 NOTE — H&P ADULT - ASSESSMENT
89 y/o male hx dm prostate cancer with mets to bone, hld, c/o pain to right hip/knee over past several days. Patient is unable to walk and care for himself due to his leg pain, and requires admission to rehab facility. 89 y/o male hx dm prostate cancer with mets to bone, hld, c/o pain to right hip/knee over past several days. Patient is unable to walk and care for himself due to his leg pain, and requires admission to rehab facility. Patient noted to be anemic with Hg 6.0.

## 2018-10-08 NOTE — ED ADULT TRIAGE NOTE - CHIEF COMPLAINT QUOTE
patient BIBA , c/o of R leg pain and weakness started yesterday morning , patient moving all extremities no facial droop , clear speech , denied chest pain denied difficulty breathing , denied headache denied dizziness

## 2018-10-08 NOTE — H&P ADULT - NSHPPHYSICALEXAM_GEN_ALL_CORE
Vital Signs Last 24 Hrs  T(C): 36.8 (08 Oct 2018 15:30), Max: 36.8 (08 Oct 2018 15:30)  T(F): 98.3 (08 Oct 2018 15:30), Max: 98.3 (08 Oct 2018 15:30)  HR: 78 (08 Oct 2018 15:30) (76 - 78)  BP: 143/68 (08 Oct 2018 15:30) (120/61 - 143/68)  RR: 18 (08 Oct 2018 15:30) (18 - 19)  SpO2: 100% (08 Oct 2018 15:30) (100% - 100%)    PHYSICAL EXAM:  GENERAL: NAD, well-groomed, well-developed  HEAD:  Atraumatic, Normocephalic  EYES: EOMI, PERRL, conjunctiva and sclera clear  ENMT: No tonsillar erythema, exudates, or enlargement; Moist mucous membranes  NECK: Supple, No JVD, Normal thyroid  NERVOUS SYSTEM:  Alert & Oriented X2, Difficulty concentrating  CHEST/LUNG: Clear to auscultation bilaterally; No rales, rhonchi, wheezing, or rubs  HEART: Regular rate and rhythm; No murmurs appreciated  ABDOMEN: Soft, Nontender, Nondistended; Bowel sounds present  MSK: Pain with movement of right leg. ROM intact in all extremities, 5/5 strength in upper and lower extremities, B/L.  EXTREMITIES:  2+ Peripheral Pulses, No clubbing, cyanosis, or edema  LYMPH: No lymphadenopathy noted  SKIN: No rashes or lesions

## 2018-10-08 NOTE — H&P ADULT - HISTORY OF PRESENT ILLNESS
89 y/o male hx dm prostate cancer with mets to bone, hld, c/o pain to right hip/knee over past several days. Poor historian. Seen for similar 1 month ago with xray showing mets, neg dvt u/s. Denies other pain. Denies CP, SOB, palpitations, headache, dizziness, fever, chills, sweats, abdominal pain, nausea, vomiting, diarrhea, constipation, dysuria, urgency or frequency.

## 2018-10-08 NOTE — ED PROVIDER NOTE - PHYSICAL EXAMINATION
Gen: No acute distress, non toxic  HEENT: Mucous membranes moist, pink conjunctivae, EOMI  CV: RRR, nl s1/s2.  Resp: CTAB, normal rate and effort  GI: Abdomen soft, NT, ND. No rebound, no guarding  : No CVAT  Neuro: Alert, moving all 4 extremities  MSK: tenderness over right hip. full rom. equal length, no rotation. good pulses/neuro distally  Skin: No rashes. intact and perfused.

## 2018-10-08 NOTE — PHYSICAL THERAPY INITIAL EVALUATION ADULT - TRANSFER TRAINING, PT EVAL
Pt will independently perform sit to/from stand transfers without LOB using rolling walker by 4 weeks

## 2018-10-08 NOTE — CHART NOTE - NSCHARTNOTEFT_GEN_A_CORE
[facility] PA    Patient noted to have a hemoglobin of 6.0. Patient is not competent to provide consent for blood transfusion. Attempted to call daughter x 2. Will attempt to reach daughter again. If unable to reach daughter, patient will require a physician override.

## 2018-10-08 NOTE — ED ADULT NURSE REASSESSMENT NOTE - NS ED NURSE REASSESS COMMENT FT1
pt for blood transfusion type and screen sent needs consent from family pt is in competent report to rn on floor

## 2018-10-08 NOTE — PHYSICAL THERAPY INITIAL EVALUATION ADULT - GENERAL OBSERVATIONS, REHAB EVAL
Pt seen supine in stretcher, NAD, alert and OX2. Pt performed bed mobility independently, sit to stand c CGAx1 and rolling walker, ambulates 25ft c rolling walker and min assistx1 secondary to poor strength and balance. Pt resumed supine position in bed, call bell, NAD.

## 2018-10-08 NOTE — ED PROVIDER NOTE - PROGRESS NOTE DETAILS
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 no indication for further a/c than aspirin especially given pt 89 and not steady on feet. lives alone. spoke to nephew Guillaume who is coming in, he said daughter wanted pt to be admitted to NH because can't walk anymore with leg pain. PT consulted, SW involved. Ivan: seen by PT, needs subacute, needs admission. dr. sawyer Ivan: hgb came back at 6, pt with prostate cancer. informed Dr. Spangler (admitting doctor), aware likely needs transfusion.

## 2018-10-08 NOTE — H&P ADULT - NSHPREVIEWOFSYSTEMS_GEN_ALL_CORE
REVIEW OF SYSTEMS:  Constitutional: Denies fever, weight loss, fatigue  Eye: Denies eye pain, visual changes, discharge, blurred vision  ENT: Denies hearing changes, tinnitus, vertigo, sinus congestion, sore throat  Neck: Denies pain or stiffness  Respiratory: Denies cough, wheezing, chills, hemoptysis, shortness of breath, difficulty breathing  Cardiovascular: Denies chest pain, palpitations, dizziness, leg swelling  Gastrointestinal: Denies abdominal pain, nausea, vomiting, hematemesis, diarrhea, constipation, melena, hematochezia  Genitourinary: Denies dysuria, frequency, hematuria, retention, incontinence  Neurological: Denies headaches, memory loss, loss of strength, numbness, tremors  Skin: Denies itching, burning, rashes, lesions   Endocrine: Denies heat or cold intolerance, hair loss  Musculoskeletal: Admits to right leg pain, Denies joint pain or swelling, or back pain  Psychiatric: Denies depression, anxiety, mood swings, difficulty sleeping, suicidal ideation  Hematology: Denies easy bruising, bleeding gums  Immunologic: Denies hives or eczema

## 2018-10-08 NOTE — PHYSICAL THERAPY INITIAL EVALUATION ADULT - BALANCE TRAINING, PT EVAL
Pt will increase static/dynamic standing balance to WFL to perform all functional mobility without LOB, by 4 weeks

## 2018-10-09 ENCOUNTER — TRANSCRIPTION ENCOUNTER (OUTPATIENT)
Age: 83
End: 2018-10-09

## 2018-10-09 LAB
ALBUMIN SERPL ELPH-MCNC: 2.4 G/DL — LOW (ref 3.3–5)
ALP SERPL-CCNC: 1123 U/L — HIGH (ref 40–120)
ALT FLD-CCNC: 7 U/L — LOW (ref 12–78)
ANION GAP SERPL CALC-SCNC: 11 MMOL/L — SIGNIFICANT CHANGE UP (ref 5–17)
AST SERPL-CCNC: 29 U/L — SIGNIFICANT CHANGE UP (ref 15–37)
BASOPHILS # BLD AUTO: 0 K/UL — SIGNIFICANT CHANGE UP (ref 0–0.2)
BASOPHILS NFR BLD AUTO: 0 % — SIGNIFICANT CHANGE UP (ref 0–2)
BILIRUB SERPL-MCNC: 0.9 MG/DL — SIGNIFICANT CHANGE UP (ref 0.2–1.2)
BUN SERPL-MCNC: 16 MG/DL — SIGNIFICANT CHANGE UP (ref 7–23)
CALCIUM SERPL-MCNC: 8.3 MG/DL — LOW (ref 8.5–10.1)
CHLORIDE SERPL-SCNC: 106 MMOL/L — SIGNIFICANT CHANGE UP (ref 96–108)
CO2 SERPL-SCNC: 23 MMOL/L — SIGNIFICANT CHANGE UP (ref 22–31)
CREAT SERPL-MCNC: 0.88 MG/DL — SIGNIFICANT CHANGE UP (ref 0.5–1.3)
EOSINOPHIL # BLD AUTO: 0.02 K/UL — SIGNIFICANT CHANGE UP (ref 0–0.5)
EOSINOPHIL NFR BLD AUTO: 0.8 % — SIGNIFICANT CHANGE UP (ref 0–6)
GLUCOSE SERPL-MCNC: 68 MG/DL — LOW (ref 70–99)
HCT VFR BLD CALC: 29.8 % — LOW (ref 39–50)
HGB BLD-MCNC: 9.5 G/DL — LOW (ref 13–17)
IMM GRANULOCYTES NFR BLD AUTO: 1.5 % — SIGNIFICANT CHANGE UP (ref 0–1.5)
IRON SATN MFR SERPL: 35 % — SIGNIFICANT CHANGE UP (ref 16–55)
IRON SATN MFR SERPL: 70 UG/DL — SIGNIFICANT CHANGE UP (ref 45–165)
LYMPHOCYTES # BLD AUTO: 0.45 K/UL — LOW (ref 1–3.3)
LYMPHOCYTES # BLD AUTO: 17.3 % — SIGNIFICANT CHANGE UP (ref 13–44)
MCHC RBC-ENTMCNC: 27.5 PG — SIGNIFICANT CHANGE UP (ref 27–34)
MCHC RBC-ENTMCNC: 31.9 GM/DL — LOW (ref 32–36)
MCV RBC AUTO: 86.4 FL — SIGNIFICANT CHANGE UP (ref 80–100)
MONOCYTES # BLD AUTO: 0.13 K/UL — SIGNIFICANT CHANGE UP (ref 0–0.9)
MONOCYTES NFR BLD AUTO: 5 % — SIGNIFICANT CHANGE UP (ref 2–14)
NEUTROPHILS # BLD AUTO: 1.96 K/UL — SIGNIFICANT CHANGE UP (ref 1.8–7.4)
NEUTROPHILS NFR BLD AUTO: 75.4 % — SIGNIFICANT CHANGE UP (ref 43–77)
NRBC # BLD: 2 /100 WBCS — HIGH (ref 0–0)
PLATELET # BLD AUTO: 158 K/UL — SIGNIFICANT CHANGE UP (ref 150–400)
POTASSIUM SERPL-MCNC: 3.9 MMOL/L — SIGNIFICANT CHANGE UP (ref 3.5–5.3)
POTASSIUM SERPL-SCNC: 3.9 MMOL/L — SIGNIFICANT CHANGE UP (ref 3.5–5.3)
PROT SERPL-MCNC: 6 GM/DL — SIGNIFICANT CHANGE UP (ref 6–8.3)
RBC # BLD: 3.45 M/UL — LOW (ref 4.2–5.8)
RBC # FLD: 17.6 % — HIGH (ref 10.3–14.5)
SODIUM SERPL-SCNC: 140 MMOL/L — SIGNIFICANT CHANGE UP (ref 135–145)
TIBC SERPL-MCNC: 198 UG/DL — LOW (ref 220–430)
UIBC SERPL-MCNC: 128 UG/DL — SIGNIFICANT CHANGE UP (ref 110–370)
WBC # BLD: 2.6 K/UL — LOW (ref 3.8–10.5)
WBC # FLD AUTO: 2.6 K/UL — LOW (ref 3.8–10.5)

## 2018-10-09 PROCEDURE — 99233 SBSQ HOSP IP/OBS HIGH 50: CPT

## 2018-10-09 RX ORDER — ENOXAPARIN SODIUM 100 MG/ML
40 INJECTION SUBCUTANEOUS DAILY
Qty: 0 | Refills: 0 | Status: DISCONTINUED | OUTPATIENT
Start: 2018-10-09 | End: 2018-10-11

## 2018-10-09 RX ORDER — DOCUSATE SODIUM 100 MG
100 CAPSULE ORAL
Qty: 0 | Refills: 0 | Status: DISCONTINUED | OUTPATIENT
Start: 2018-10-09 | End: 2018-10-11

## 2018-10-09 RX ORDER — POLYETHYLENE GLYCOL 3350 17 G/17G
17 POWDER, FOR SOLUTION ORAL DAILY
Qty: 0 | Refills: 0 | Status: DISCONTINUED | OUTPATIENT
Start: 2018-10-09 | End: 2018-10-11

## 2018-10-09 RX ADMIN — REPAGLINIDE 0.5 MILLIGRAM(S): 1 TABLET ORAL at 21:52

## 2018-10-09 RX ADMIN — LATANOPROST 1 DROP(S): 0.05 SOLUTION/ DROPS OPHTHALMIC; TOPICAL at 21:51

## 2018-10-09 RX ADMIN — Medication 325 MILLIGRAM(S): at 13:59

## 2018-10-09 RX ADMIN — Medication 325 MILLIGRAM(S): at 13:58

## 2018-10-09 RX ADMIN — DORZOLAMIDE HYDROCHLORIDE TIMOLOL MALEATE 1 DROP(S): 20; 5 SOLUTION/ DROPS OPHTHALMIC at 05:23

## 2018-10-09 RX ADMIN — REPAGLINIDE 0.5 MILLIGRAM(S): 1 TABLET ORAL at 14:40

## 2018-10-09 RX ADMIN — PANTOPRAZOLE SODIUM 40 MILLIGRAM(S): 20 TABLET, DELAYED RELEASE ORAL at 08:13

## 2018-10-09 RX ADMIN — REPAGLINIDE 0.5 MILLIGRAM(S): 1 TABLET ORAL at 08:14

## 2018-10-09 RX ADMIN — NYSTATIN CREAM 1 APPLICATION(S): 100000 CREAM TOPICAL at 05:24

## 2018-10-09 NOTE — PROGRESS NOTE ADULT - ASSESSMENT
89 yo Male w/ history of DM II, prostate cancer with mets to bone, HLD, HTN, glaucoma & CKD who p/w right hip/knee pain and deconditioning and was found to be anemic with Hg 6.0.    Pain of right lower extremity   - PT consult  - Percocet for pain  - Chronic appearing clots in the posterior tibial vein - given pt's anemia he is not a good candidate for AC - will consult Dr. Yadav for recommendation     Anemia, unspecified type   - Hgb 9.5 status post  2 units PRBC  - Iron, B12 and folate levels are WNL     HTN   - diet controlled    Prostate cancer w/ mets to bone  - c/w pain management  - consult hospice  - called placed to [facility] to discuss how aggressively they want the prostate cancer treated, but no one picked up, will try again tomorrow     Prophylaxis:  DVT: Lovenox  GI: Protonix    Pt will need 3 night stay before he can go to rehab.

## 2018-10-09 NOTE — DISCHARGE NOTE ADULT - PATIENT PORTAL LINK FT
You can access the [facility] Patient Portal, offered by [facility], by registering with the following website: [email or web address]

## 2018-10-09 NOTE — DISCHARGE NOTE ADULT - PLAN OF CARE
Pain management For rehab Continue medications as prescribed  Follow up with PMD  Low-sodium diet  AHA Recipes - [email or web address] s/p 2 units PRBC  Monitor H/H at rehab Follow up with urologist Continue medications as prescribed  Follow up with PMD  Low-carbohydrate diet  ADA Recipes - [email or web address] For rehab  L distal dvt, no anticoagulation indicated

## 2018-10-09 NOTE — PROGRESS NOTE ADULT - SUBJECTIVE AND OBJECTIVE BOX
89 yo Male w/ history of DM II, prostate cancer with mets to bone, HLD, HTN, glaucoma & CKD who p/w right hip/knee pain and deconditioning and was found to be anemic with Hg 6.0. He is lying in bed in NAD.    MEDICATIONS  (STANDING):  aspirin enteric coated 325 milliGRAM(s) Oral daily  docusate sodium 100 milliGRAM(s) Oral two times a day  dorzolamide 2%/timolol 0.5% Ophthalmic Solution 1 Drop(s) Both EYES two times a day  ferrous    sulfate 325 milliGRAM(s) Oral daily  latanoprost 0.005% Ophthalmic Solution 1 Drop(s) Both EYES at bedtime  nystatin Cream 1 Application(s) Topical two times a day  pantoprazole    Tablet 40 milliGRAM(s) Oral before breakfast  repaglinide 0.5 milliGRAM(s) Oral three times a day    MEDICATIONS  (PRN):  polyethylene glycol 3350 17 Gram(s) Oral daily PRN Constipation      Allergies    No Known Allergies    Intolerances        Vital Signs Last 24 Hrs  T(C): 36.7 (09 Oct 2018 17:04), Max: 36.7 (09 Oct 2018 17:04)  T(F): 98 (09 Oct 2018 17:04), Max: 98 (09 Oct 2018 17:04)  HR: 70 (09 Oct 2018 17:04) (64 - 70)  BP: 133/65 (09 Oct 2018 17:04) (104/53 - 133/65)  BP(mean): --  RR: 18 (09 Oct 2018 17:04) (16 - 18)  SpO2: 100% (09 Oct 2018 17:04) (98% - 100%)    PHYSICAL EXAM:  GENERAL: NAD, well-groomed, well-developed  HEAD:  Atraumatic, Normocephalic  EYES: EOMI, PERRLA   NECK: Supple   NERVOUS SYSTEM:  Alert    CHEST/LUNG: Clear to auscultation bilaterally; No rales, rhonchi, wheezing, or rubs  HEART: Regular rate and rhythm; No murmurs, rubs, or gallops  ABDOMEN: Soft, Nontender, Nondistended; Bowel sounds present  EXTREMITIES:  No clubbing, cyanosis, or edema      LABS:                        9.5    2.60  )-----------( 158      ( 09 Oct 2018 06:38 )             29.8     10-09    140  |  106  |  16  ----------------------------<  68<L>  3.9   |  23  |  0.88    Ca    8.3<L>      09 Oct 2018 06:38    TPro  6.0  /  Alb  2.4<L>  /  TBili  0.9  /  DBili  x   /  AST  29  /  ALT  7<L>  /  AlkPhos  1123<H>  10-09        CAPILLARY BLOOD GLUCOSE      POCT Blood Glucose.: 87 mg/dL (09 Oct 2018 21:49)      RADIOLOGY & ADDITIONAL TESTS:    10-08-18 @ 07:01  -  10-09-18 @ 07:00  --------------------------------------------------------  IN:    Packed Red Blood Cells: 615 mL  Total IN: 615 mL    OUT:  Total OUT: 0 mL    Total NET: 615 mL      10-09-18 @ 07:01  -  10-09-18 @ 22:14  --------------------------------------------------------  IN:    Oral Fluid: 240 mL  Total IN: 240 mL    OUT:  Total OUT: 0 mL    Total NET: 240 mL

## 2018-10-09 NOTE — DISCHARGE NOTE ADULT - MEDICATION SUMMARY - MEDICATIONS TO TAKE
I will START or STAY ON the medications listed below when I get home from the hospital:    Aspirin Enteric Coated 325 mg oral delayed release tablet  -- 1 tab(s) by mouth once a day  -- Indication: For Preventive measure    enoxaparin  -- 40 milligram(s) subcutaneous once a day  -- Indication: For Preventive measure    repaglinide 0.5 mg oral tablet  -- 1 tab(s) by mouth 3 times a day  -- Indication: For Dm    nystatin 100,000 units/g topical cream (obsolete)  -- Apply on skin to affected area 2 times a day  -- Indication: For Preventive measure    ciclopirox 0.77% topical cream  -- Apply on skin to affected area 2 times a day  -- Indication: For Preventive measure    ferrous sulfate 325 mg (65 mg elemental iron) oral tablet  -- 1 tab(s) by mouth once a day  -- Indication: For Preventive measure    docusate sodium 100 mg oral capsule  -- 1 cap(s) by mouth 2 times a day  -- Indication: For Preventive measure    latanoprost 0.005% ophthalmic solution  -- 1 drop(s) to each affected eye once a day (in the evening)  -- Indication: For Preventive measure    dorzolamide-timolol 2.23%-0.68% ophthalmic solution  -- 1 drop(s) to each affected eye 2 times a day  -- Indication: For Preventive measure    omeprazole 20 mg oral delayed release tablet  -- 1 tab(s) by mouth once a day   -- Obtain medical advice before taking any non-prescription drugs as some may affect the action of this medication.  Swallow whole.  Do not crush.    -- Indication: For Preventive measure    pantoprazole 40 mg oral delayed release tablet  -- 1 tab(s) by mouth once a day (before a meal)  -- Indication: For Preventive measure    B-12 1000 mcg oral tablet  -- 1 tab(s) by mouth once a day   -- Indication: For Preventive measure

## 2018-10-09 NOTE — DISCHARGE NOTE ADULT - CARE PLAN
Principal Discharge DX:	Pain of right lower extremity  Goal:	Pain management  Assessment and plan of treatment:	For rehab  Secondary Diagnosis:	Essential hypertension  Assessment and plan of treatment:	Continue medications as prescribed  Follow up with PMD  Low-sodium diet  AHA Recipes - [email or web address]  Secondary Diagnosis:	Anemia, unspecified type  Assessment and plan of treatment:	s/p 2 units PRBC  Monitor H/H at rehab  Secondary Diagnosis:	History of prostate cancer  Assessment and plan of treatment:	Follow up with urologist  Secondary Diagnosis:	Diabetes  Assessment and plan of treatment:	Continue medications as prescribed  Follow up with PMD  Low-carbohydrate diet  ADA Recipes - [email or web address] Principal Discharge DX:	Pain of right lower extremity  Goal:	Pain management  Assessment and plan of treatment:	For rehab  L distal dvt, no anticoagulation indicated  Secondary Diagnosis:	Essential hypertension  Assessment and plan of treatment:	Continue medications as prescribed  Follow up with PMD  Low-sodium diet  AHA Recipes - [email or web address]  Secondary Diagnosis:	Anemia, unspecified type  Assessment and plan of treatment:	s/p 2 units PRBC  Monitor H/H at rehab  Secondary Diagnosis:	History of prostate cancer  Assessment and plan of treatment:	Follow up with urologist  Secondary Diagnosis:	Diabetes  Assessment and plan of treatment:	Continue medications as prescribed  Follow up with PMD  Low-carbohydrate diet  ADA Recipes - [email or web address]

## 2018-10-09 NOTE — DISCHARGE NOTE ADULT - HOSPITAL COURSE
89 yo Male w/ history of DM II, prostate cancer with mets to bone, HLD, HTN, glaucoma & CKD who p/w right hip/knee pain and deconditioning and was found to be anemic with Hg 6.0.    Pain of right lower extremity   - PT consult  - Percocet for pain  - Chronic appearing clots in the posterior tibial vein - given pt's anemia he is not a good candidate for AC - discussed w vascular surgeon dr mcclain who agreed that AC in NOT recommended  Distal dvt     Anemia, unspecified type   - Hgb 9.5 status post  2 units PRBC  [facility]  B12 on the lowside will send mma/homocystine   supplement    HTN   - diet controlled    Prostate cancer w/ mets to bone  - c/w pain management     Prophylaxis:  DVT: Lovenox  GI: Protonix         Attending Attestation:   45 minutes spent on total dc encounter; more than 50% of the visit was spent counseling and/or coordinating care by the attending physician.

## 2018-10-10 DIAGNOSIS — E43 UNSPECIFIED SEVERE PROTEIN-CALORIE MALNUTRITION: ICD-10-CM

## 2018-10-10 DIAGNOSIS — Z71.89 OTHER SPECIFIED COUNSELING: ICD-10-CM

## 2018-10-10 LAB
ANION GAP SERPL CALC-SCNC: 9 MMOL/L — SIGNIFICANT CHANGE UP (ref 5–17)
BASOPHILS # BLD AUTO: 0.01 K/UL — SIGNIFICANT CHANGE UP (ref 0–0.2)
BASOPHILS NFR BLD AUTO: 0.3 % — SIGNIFICANT CHANGE UP (ref 0–2)
BUN SERPL-MCNC: 18 MG/DL — SIGNIFICANT CHANGE UP (ref 7–23)
CALCIUM SERPL-MCNC: 8.2 MG/DL — LOW (ref 8.5–10.1)
CHLORIDE SERPL-SCNC: 104 MMOL/L — SIGNIFICANT CHANGE UP (ref 96–108)
CO2 SERPL-SCNC: 25 MMOL/L — SIGNIFICANT CHANGE UP (ref 22–31)
CREAT SERPL-MCNC: 0.93 MG/DL — SIGNIFICANT CHANGE UP (ref 0.5–1.3)
EOSINOPHIL # BLD AUTO: 0.02 K/UL — SIGNIFICANT CHANGE UP (ref 0–0.5)
EOSINOPHIL NFR BLD AUTO: 0.6 % — SIGNIFICANT CHANGE UP (ref 0–6)
FERRITIN SERPL-MCNC: 1079 NG/ML — HIGH (ref 30–400)
FOLATE SERPL-MCNC: 19.4 NG/ML — SIGNIFICANT CHANGE UP
GLUCOSE SERPL-MCNC: 54 MG/DL — LOW (ref 70–99)
HCT VFR BLD CALC: 29.6 % — LOW (ref 39–50)
HGB BLD-MCNC: 9.5 G/DL — LOW (ref 13–17)
IMM GRANULOCYTES NFR BLD AUTO: 0.8 % — SIGNIFICANT CHANGE UP (ref 0–1.5)
LYMPHOCYTES # BLD AUTO: 0.74 K/UL — LOW (ref 1–3.3)
LYMPHOCYTES # BLD AUTO: 20.4 % — SIGNIFICANT CHANGE UP (ref 13–44)
MAGNESIUM SERPL-MCNC: 1.9 MG/DL — SIGNIFICANT CHANGE UP (ref 1.6–2.6)
MCHC RBC-ENTMCNC: 27.4 PG — SIGNIFICANT CHANGE UP (ref 27–34)
MCHC RBC-ENTMCNC: 32.1 GM/DL — SIGNIFICANT CHANGE UP (ref 32–36)
MCV RBC AUTO: 85.3 FL — SIGNIFICANT CHANGE UP (ref 80–100)
MONOCYTES # BLD AUTO: 0.6 K/UL — SIGNIFICANT CHANGE UP (ref 0–0.9)
MONOCYTES NFR BLD AUTO: 16.6 % — HIGH (ref 2–14)
NEUTROPHILS # BLD AUTO: 2.22 K/UL — SIGNIFICANT CHANGE UP (ref 1.8–7.4)
NEUTROPHILS NFR BLD AUTO: 61.3 % — SIGNIFICANT CHANGE UP (ref 43–77)
PHOSPHATE SERPL-MCNC: 2.7 MG/DL — SIGNIFICANT CHANGE UP (ref 2.5–4.5)
PLATELET # BLD AUTO: 175 K/UL — SIGNIFICANT CHANGE UP (ref 150–400)
POTASSIUM SERPL-MCNC: 3.3 MMOL/L — LOW (ref 3.5–5.3)
POTASSIUM SERPL-SCNC: 3.3 MMOL/L — LOW (ref 3.5–5.3)
RBC # BLD: 3.47 M/UL — LOW (ref 4.2–5.8)
RBC # FLD: 17.6 % — HIGH (ref 10.3–14.5)
SODIUM SERPL-SCNC: 138 MMOL/L — SIGNIFICANT CHANGE UP (ref 135–145)
VIT B12 SERPL-MCNC: 205 PG/ML — LOW (ref 232–1245)
WBC # BLD: 3.62 K/UL — LOW (ref 3.8–10.5)
WBC # FLD AUTO: 3.62 K/UL — LOW (ref 3.8–10.5)

## 2018-10-10 PROCEDURE — 99498 ADVNCD CARE PLAN ADDL 30 MIN: CPT

## 2018-10-10 PROCEDURE — 99497 ADVNCD CARE PLAN 30 MIN: CPT

## 2018-10-10 PROCEDURE — 99232 SBSQ HOSP IP/OBS MODERATE 35: CPT

## 2018-10-10 RX ORDER — POTASSIUM CHLORIDE 20 MEQ
40 PACKET (EA) ORAL EVERY 4 HOURS
Qty: 0 | Refills: 0 | Status: COMPLETED | OUTPATIENT
Start: 2018-10-10 | End: 2018-10-10

## 2018-10-10 RX ORDER — GLUCAGON INJECTION, SOLUTION 0.5 MG/.1ML
1 INJECTION, SOLUTION SUBCUTANEOUS ONCE
Qty: 0 | Refills: 0 | Status: DISCONTINUED | OUTPATIENT
Start: 2018-10-10 | End: 2018-10-11

## 2018-10-10 RX ORDER — DEXTROSE 50 % IN WATER 50 %
15 SYRINGE (ML) INTRAVENOUS ONCE
Qty: 0 | Refills: 0 | Status: DISCONTINUED | OUTPATIENT
Start: 2018-10-10 | End: 2018-10-11

## 2018-10-10 RX ORDER — DEXTROSE 50 % IN WATER 50 %
12.5 SYRINGE (ML) INTRAVENOUS ONCE
Qty: 0 | Refills: 0 | Status: COMPLETED | OUTPATIENT
Start: 2018-10-10 | End: 2018-10-10

## 2018-10-10 RX ORDER — DEXTROSE 50 % IN WATER 50 %
25 SYRINGE (ML) INTRAVENOUS ONCE
Qty: 0 | Refills: 0 | Status: DISCONTINUED | OUTPATIENT
Start: 2018-10-10 | End: 2018-10-11

## 2018-10-10 RX ORDER — SODIUM CHLORIDE 9 MG/ML
1000 INJECTION, SOLUTION INTRAVENOUS
Qty: 0 | Refills: 0 | Status: DISCONTINUED | OUTPATIENT
Start: 2018-10-10 | End: 2018-10-11

## 2018-10-10 RX ORDER — DEXTROSE 50 % IN WATER 50 %
12.5 SYRINGE (ML) INTRAVENOUS ONCE
Qty: 0 | Refills: 0 | Status: DISCONTINUED | OUTPATIENT
Start: 2018-10-10 | End: 2018-10-11

## 2018-10-10 RX ADMIN — REPAGLINIDE 0.5 MILLIGRAM(S): 1 TABLET ORAL at 05:23

## 2018-10-10 RX ADMIN — Medication 100 MILLIGRAM(S): at 05:23

## 2018-10-10 RX ADMIN — Medication 40 MILLIEQUIVALENT(S): at 09:31

## 2018-10-10 RX ADMIN — PANTOPRAZOLE SODIUM 40 MILLIGRAM(S): 20 TABLET, DELAYED RELEASE ORAL at 07:59

## 2018-10-10 RX ADMIN — ENOXAPARIN SODIUM 40 MILLIGRAM(S): 100 INJECTION SUBCUTANEOUS at 11:59

## 2018-10-10 RX ADMIN — Medication 325 MILLIGRAM(S): at 11:59

## 2018-10-10 RX ADMIN — NYSTATIN CREAM 1 APPLICATION(S): 100000 CREAM TOPICAL at 17:55

## 2018-10-10 RX ADMIN — LATANOPROST 1 DROP(S): 0.05 SOLUTION/ DROPS OPHTHALMIC; TOPICAL at 22:20

## 2018-10-10 RX ADMIN — DORZOLAMIDE HYDROCHLORIDE TIMOLOL MALEATE 1 DROP(S): 20; 5 SOLUTION/ DROPS OPHTHALMIC at 17:55

## 2018-10-10 RX ADMIN — Medication 12.5 GRAM(S): at 08:31

## 2018-10-10 RX ADMIN — REPAGLINIDE 0.5 MILLIGRAM(S): 1 TABLET ORAL at 22:19

## 2018-10-10 RX ADMIN — Medication 100 MILLIGRAM(S): at 17:53

## 2018-10-10 RX ADMIN — REPAGLINIDE 0.5 MILLIGRAM(S): 1 TABLET ORAL at 13:32

## 2018-10-10 RX ADMIN — Medication 40 MILLIEQUIVALENT(S): at 13:32

## 2018-10-10 RX ADMIN — NYSTATIN CREAM 1 APPLICATION(S): 100000 CREAM TOPICAL at 05:24

## 2018-10-10 RX ADMIN — DORZOLAMIDE HYDROCHLORIDE TIMOLOL MALEATE 1 DROP(S): 20; 5 SOLUTION/ DROPS OPHTHALMIC at 05:23

## 2018-10-10 NOTE — PROGRESS NOTE ADULT - SUBJECTIVE AND OBJECTIVE BOX
Patient is a 89y old  Male who presents with a chief complaint of Right leg pain (09 Oct 2018 22:14)      INTERVAL HPI/OVERNIGHT EVENTS: no events     MEDICATIONS  (STANDING):  aspirin enteric coated 325 milliGRAM(s) Oral daily  dextrose 5%. 1000 milliLiter(s) (50 mL/Hr) IV Continuous <Continuous>  dextrose 50% Injectable 12.5 Gram(s) IV Push once  dextrose 50% Injectable 25 Gram(s) IV Push once  dextrose 50% Injectable 25 Gram(s) IV Push once  docusate sodium 100 milliGRAM(s) Oral two times a day  dorzolamide 2%/timolol 0.5% Ophthalmic Solution 1 Drop(s) Both EYES two times a day  enoxaparin Injectable 40 milliGRAM(s) SubCutaneous daily  ferrous    sulfate 325 milliGRAM(s) Oral daily  latanoprost 0.005% Ophthalmic Solution 1 Drop(s) Both EYES at bedtime  nystatin Cream 1 Application(s) Topical two times a day  pantoprazole    Tablet 40 milliGRAM(s) Oral before breakfast  potassium chloride    Tablet ER 40 milliEquivalent(s) Oral every 4 hours  repaglinide 0.5 milliGRAM(s) Oral three times a day    MEDICATIONS  (PRN):  dextrose 40% Gel 15 Gram(s) Oral once PRN Blood Glucose LESS THAN 70 milliGRAM(s)/deciLiter  glucagon  Injectable 1 milliGRAM(s) IntraMuscular once PRN Glucose <70 milliGRAM(s)/deciLiter  polyethylene glycol 3350 17 Gram(s) Oral daily PRN Constipation      Allergies    No Known Allergies    Intolerances        REVIEW OF SYSTEMS:  CONSTITUTIONAL: No fever, weight loss, or fatigue  EYES: No eye pain, visual disturbances, or discharge  ENMT:  No difficulty hearing, tinnitus, vertigo; No sinus or throat pain  NECK: No pain or stiffness  BREASTS: No pain, masses, or nipple discharge  RESPIRATORY: No cough, wheezing, chills or hemoptysis; No shortness of breath  CARDIOVASCULAR: No chest pain, palpitations, dizziness, or leg swelling  GASTROINTESTINAL: No abdominal or epigastric pain. No nausea, vomiting, or hematemesis; No diarrhea or constipation. No melena or hematochezia.  GENITOURINARY: No dysuria, frequency, hematuria, or incontinence  NEUROLOGICAL: No headaches, memory loss, loss of strength, numbness, or tremors  SKIN: No itching, burning, rashes, or lesions   LYMPH NODES: No enlarged glands  ENDOCRINE: No heat or cold intolerance; No hair loss  MUSCULOSKELETAL: No joint pain or swelling; No muscle, back, or extremity pain  PSYCHIATRIC: No depression, anxiety, mood swings, or difficulty sleeping  HEME/LYMPH: No easy bruising, or bleeding gums  ALLERGY AND IMMUNOLOGIC: No hives or eczema    Vital Signs Last 24 Hrs  T(C): 36.6 (10 Oct 2018 04:35), Max: 36.7 (09 Oct 2018 17:04)  T(F): 97.8 (10 Oct 2018 04:35), Max: 98 (09 Oct 2018 17:04)  HR: 64 (10 Oct 2018 04:35) (64 - 72)  BP: 140/76 (10 Oct 2018 04:35) (113/49 - 140/76)  BP(mean): --  RR: 17 (10 Oct 2018 04:35) (16 - 18)  SpO2: 99% (10 Oct 2018 04:35) (99% - 100%)    PHYSICAL EXAM:  GENERAL: NAD, well-groomed, well-developed  HEAD:  Atraumatic, Normocephalic  EYES: EOMI, PERRLA, conjunctiva and sclera clear  ENMT: No tonsillar erythema, exudates, or enlargement; Moist mucous membranes, Good dentition, No lesions  NECK: Supple, No JVD, Normal thyroid  NERVOUS SYSTEM:  Alert & Oriented X3, Good concentration; Motor Strength 5/5 B/L upper and lower extremities; DTRs 2+ intact and symmetric  CHEST/LUNG: Clear to percussion bilaterally; No rales, rhonchi, wheezing, or rubs  HEART: Regular rate and rhythm; No murmurs, rubs, or gallops  ABDOMEN: Soft, Nontender, Nondistended; Bowel sounds present  EXTREMITIES:  2+ Peripheral Pulses, No clubbing, cyanosis, or edema  LYMPH: No lymphadenopathy noted  SKIN: No rashes or lesions    LABS:                        9.5    3.62  )-----------( 175      ( 10 Oct 2018 06:52 )             29.6     10-10    138  |  104  |  18  ----------------------------<  54<L>  3.3<L>   |  25  |  0.93    Ca    8.2<L>      10 Oct 2018 06:52  Phos  2.7     10-10  Mg     1.9     10-10    TPro  6.0  /  Alb  2.4<L>  /  TBili  0.9  /  DBili  x   /  AST  29  /  ALT  7<L>  /  AlkPhos  1123<H>  10-09        CAPILLARY BLOOD GLUCOSE      POCT Blood Glucose.: 168 mg/dL (10 Oct 2018 08:46)  POCT Blood Glucose.: 62 mg/dL (10 Oct 2018 08:02)  POCT Blood Glucose.: 57 mg/dL (10 Oct 2018 07:32)  POCT Blood Glucose.: 87 mg/dL (09 Oct 2018 21:49)      RADIOLOGY & ADDITIONAL TESTS:    Imaging Personally Reviewed:  [ X] YES  [ ] NO    Consultant(s) Notes Reviewed:  [ X] YES  [ ] NO    Care Discussed with Consultants/Other Providers [X ] YES  [ ] NO

## 2018-10-10 NOTE — CONSULT NOTE ADULT - ASSESSMENT
Family meeting on: DATE: 10/10/18 Met and spoke to patient who is alert and oriented x3 and discussed goals of care and advance care planning. Patient defer the discussions to his daughter as per him she is the one making the medical decisions. Called Vira Lee  and  and left message to call back for goals of care conversations.   Plan is D/C back home when medically stable.    RECOMMENDATIONS: CONSIDER DNR/ DNI, COMFORT CARE. PAIN AND SYMPTOM MANAGEMENT WITH HOSPICE CARE.

## 2018-10-10 NOTE — PROGRESS NOTE ADULT - ASSESSMENT
89 yo Male w/ history of DM II, prostate cancer with mets to bone, HLD, HTN, glaucoma & CKD who p/w right hip/knee pain and deconditioning and was found to be anemic with Hg 6.0.    Pain of right lower extremity   - PT consult  - Percocet for pain  - Chronic appearing clots in the posterior tibial vein - given pt's anemia he is not a good candidate for AC - will consult Dr. Yadav for recommendation     Anemia, unspecified type   - Hgb 9.5 status post  2 units PRBC  -maia anemia of chronic dz  B12 on the lowside will send mma/homocystine     HTN   - diet controlled    Prostate cancer w/ mets to bone  - c/w pain management  - consult hospice  - reaching out to daughter for more information    Prophylaxis:  DVT: Lovenox  GI: Protonix    Pt will need 3 night stay before he can go to rehab.

## 2018-10-11 VITALS
HEART RATE: 79 BPM | OXYGEN SATURATION: 79 % | RESPIRATION RATE: 17 BRPM | DIASTOLIC BLOOD PRESSURE: 51 MMHG | TEMPERATURE: 98 F | SYSTOLIC BLOOD PRESSURE: 106 MMHG

## 2018-10-11 LAB
ANION GAP SERPL CALC-SCNC: 8 MMOL/L — SIGNIFICANT CHANGE UP (ref 5–17)
BUN SERPL-MCNC: 23 MG/DL — SIGNIFICANT CHANGE UP (ref 7–23)
CALCIUM SERPL-MCNC: 8.1 MG/DL — LOW (ref 8.5–10.1)
CHLORIDE SERPL-SCNC: 107 MMOL/L — SIGNIFICANT CHANGE UP (ref 96–108)
CO2 SERPL-SCNC: 26 MMOL/L — SIGNIFICANT CHANGE UP (ref 22–31)
CREAT SERPL-MCNC: 0.97 MG/DL — SIGNIFICANT CHANGE UP (ref 0.5–1.3)
GLUCOSE SERPL-MCNC: 69 MG/DL — LOW (ref 70–99)
HCT VFR BLD CALC: 27.7 % — LOW (ref 39–50)
HGB BLD-MCNC: 8.9 G/DL — LOW (ref 13–17)
MCHC RBC-ENTMCNC: 27.6 PG — SIGNIFICANT CHANGE UP (ref 27–34)
MCHC RBC-ENTMCNC: 32.1 GM/DL — SIGNIFICANT CHANGE UP (ref 32–36)
MCV RBC AUTO: 85.8 FL — SIGNIFICANT CHANGE UP (ref 80–100)
NRBC # BLD: 0 /100 WBCS — SIGNIFICANT CHANGE UP (ref 0–0)
PLATELET # BLD AUTO: 171 K/UL — SIGNIFICANT CHANGE UP (ref 150–400)
POTASSIUM SERPL-MCNC: 4.2 MMOL/L — SIGNIFICANT CHANGE UP (ref 3.5–5.3)
POTASSIUM SERPL-SCNC: 4.2 MMOL/L — SIGNIFICANT CHANGE UP (ref 3.5–5.3)
RBC # BLD: 3.23 M/UL — LOW (ref 4.2–5.8)
RBC # FLD: 18.2 % — HIGH (ref 10.3–14.5)
SODIUM SERPL-SCNC: 141 MMOL/L — SIGNIFICANT CHANGE UP (ref 135–145)
WBC # BLD: 3.56 K/UL — LOW (ref 3.8–10.5)
WBC # FLD AUTO: 3.56 K/UL — LOW (ref 3.8–10.5)

## 2018-10-11 PROCEDURE — 99239 HOSP IP/OBS DSCHRG MGMT >30: CPT

## 2018-10-11 RX ORDER — PANTOPRAZOLE SODIUM 20 MG/1
1 TABLET, DELAYED RELEASE ORAL
Qty: 0 | Refills: 0 | DISCHARGE
Start: 2018-10-11

## 2018-10-11 RX ORDER — DOCUSATE SODIUM 100 MG
1 CAPSULE ORAL
Qty: 0 | Refills: 0 | DISCHARGE
Start: 2018-10-11

## 2018-10-11 RX ORDER — PREGABALIN 225 MG/1
1 CAPSULE ORAL
Qty: 14 | Refills: 0
Start: 2018-10-11 | End: 2018-10-24

## 2018-10-11 RX ORDER — ENOXAPARIN SODIUM 100 MG/ML
40 INJECTION SUBCUTANEOUS
Qty: 0 | Refills: 0 | DISCHARGE
Start: 2018-10-11

## 2018-10-11 RX ADMIN — PANTOPRAZOLE SODIUM 40 MILLIGRAM(S): 20 TABLET, DELAYED RELEASE ORAL at 05:48

## 2018-10-11 RX ADMIN — Medication 100 MILLIGRAM(S): at 05:48

## 2018-10-11 RX ADMIN — Medication 325 MILLIGRAM(S): at 11:57

## 2018-10-11 RX ADMIN — DORZOLAMIDE HYDROCHLORIDE TIMOLOL MALEATE 1 DROP(S): 20; 5 SOLUTION/ DROPS OPHTHALMIC at 05:48

## 2018-10-11 RX ADMIN — NYSTATIN CREAM 1 APPLICATION(S): 100000 CREAM TOPICAL at 05:48

## 2018-10-11 RX ADMIN — REPAGLINIDE 0.5 MILLIGRAM(S): 1 TABLET ORAL at 14:08

## 2018-10-11 RX ADMIN — REPAGLINIDE 0.5 MILLIGRAM(S): 1 TABLET ORAL at 05:48

## 2018-10-11 RX ADMIN — ENOXAPARIN SODIUM 40 MILLIGRAM(S): 100 INJECTION SUBCUTANEOUS at 11:57

## 2018-10-11 NOTE — CONSULT NOTE ADULT - SUBJECTIVE AND OBJECTIVE BOX
Vascular Attending:   The pt is seen and examined yesteray and today  for eval of the right leg DVT.  Pt has H/O prostae cancer an dmets to the Bones and had come for pain and on the venous Duplex was found to have old DVT in the right calf,      HPI:  89 y/o male hx dm prostate cancer with mets to bone, hld, c/o pain to right hip/knee over past several days. Poor historian. Seen for similar 1 month ago with xray showing mets, neg dvt u/s. Denies other pain. Denies CP, SOB, palpitations, headache, dizziness, fever, chills, sweats, abdominal pain, nausea, vomiting, diarrhea, constipation, dysuria, urgency or frequency. (08 Oct 2018 16:25)      PAST MEDICAL & SURGICAL HISTORY:  CKD (chronic kidney disease)  Hyperlipidemia  Glaucoma  Prostate cancer  HTN (hypertension)  Diabetes  H/O abdominal surgery        MEDICATIONS  (STANDING):  aspirin enteric coated 325 milliGRAM(s) Oral daily  dextrose 5%. 1000 milliLiter(s) (50 mL/Hr) IV Continuous <Continuous>  dextrose 50% Injectable 12.5 Gram(s) IV Push once  dextrose 50% Injectable 25 Gram(s) IV Push once  dextrose 50% Injectable 25 Gram(s) IV Push once  docusate sodium 100 milliGRAM(s) Oral two times a day  dorzolamide 2%/timolol 0.5% Ophthalmic Solution 1 Drop(s) Both EYES two times a day  enoxaparin Injectable 40 milliGRAM(s) SubCutaneous daily  ferrous    sulfate 325 milliGRAM(s) Oral daily  latanoprost 0.005% Ophthalmic Solution 1 Drop(s) Both EYES at bedtime  nystatin Cream 1 Application(s) Topical two times a day  pantoprazole    Tablet 40 milliGRAM(s) Oral before breakfast  repaglinide 0.5 milliGRAM(s) Oral three times a day    MEDICATIONS  (PRN):  dextrose 40% Gel 15 Gram(s) Oral once PRN Blood Glucose LESS THAN 70 milliGRAM(s)/deciLiter  glucagon  Injectable 1 milliGRAM(s) IntraMuscular once PRN Glucose <70 milliGRAM(s)/deciLiter  polyethylene glycol 3350 17 Gram(s) Oral daily PRN Constipation      Allergies    No Known Allergies    Intolerances        SOCIAL HISTORY:      Vital Signs Last 24 Hrs  T(C): 36.4 (11 Oct 2018 05:09), Max: 37.1 (10 Oct 2018 23:16)  T(F): 97.5 (11 Oct 2018 05:09), Max: 98.7 (10 Oct 2018 23:16)  HR: 63 (11 Oct 2018 05:09) (63 - 78)  BP: 129/59 (11 Oct 2018 05:09) (110/62 - 131/69)  BP(mean): --  RR: 16 (11 Oct 2018 05:09) (16 - 17)  SpO2: 98% (11 Oct 2018 05:09) (96% - 98%)    P/E:- Awke, responsive, appropriate s  CAROTIDS:- Bilateral carotids with no Bruits. No scars of previous catheterisation.  UPPER EXTREMITIES:- Bilateral radial artery pulses are normal and no ischemia of the Hands. No edema of the arms.  ABDOMEN:- No pulsatile mass in the abdomen and no ascites.  LOWER EXTREMITIES:- Bilateral LE with No Edema and no CVI, No varicose veins, no ulcers. The calf is non tender Bilateraly and no infn and n cellulitis and there is no evidence of SVT      LABS:                        8.9    3.56  )-----------( 171      ( 11 Oct 2018 07:21 )             27.7     10-11    141  |  107  |  23  ----------------------------<  69<L>  4.2   |  26  |  0.97    Ca    8.1<L>      11 Oct 2018 07:21  Phos  2.7     10-10  Mg     1.9     10-10            R  EXAM:  US DPLX LWR EXT VEINS LTD RT                            PROCEDURE DATE:  10/08/2018          INTERPRETATION:    Date of study: 10/8/18.    CLINICAL INFORMATION: 89-yo-male with right leg pain - r/o DVT.    COMPARISON: 9/9/18 right leg venousDuplex scan was negative.    TECHNIQUE: Duplex sonography of the RIGHT LOWER extremity with color and   spectral Doppler, with and without compression.      FINDINGS:    There is normal compressibility of the right common femoral, femoral and   popliteal veins. There are small, chronic appearing clots in the mid and   distal aspects of the posterior tibial vein - the proximal portion of the   posterior tibial vein shows no thrombus. The peroneal vein is   unremarkable.     Doppler examination shows normal spontaneous and phasic flow.    IMPRESSION:   Chronic appearing clots in the posterior tibial vein, as above.  No acute right leg venous thrombosis noted.  Discussed with Dr. JACINTO Love in ER at 2:40PM on 10/8/18.                        ARYAN MARTINEZ M.D., ATTENDING RADIOLOGIST  This document has been electronically signed. Oct  8 2018  2:47PM      Impression and Plan:  The pt has old DVT in the right calf and aymptomatic and does not need to be treated withAC. pt cleared for DC from vascular standpoint.

## 2018-10-14 LAB
HOMOCYSTEINE LEVEL: 13.7 UMOL/L — HIGH
METHYLMALONIC ACID LEVEL: 694 NMOL/L — HIGH (ref 87–318)

## 2018-10-23 DIAGNOSIS — M79.604 PAIN IN RIGHT LEG: ICD-10-CM

## 2018-10-23 DIAGNOSIS — I12.9 HYPERTENSIVE CHRONIC KIDNEY DISEASE WITH STAGE 1 THROUGH STAGE 4 CHRONIC KIDNEY DISEASE, OR UNSPECIFIED CHRONIC KIDNEY DISEASE: ICD-10-CM

## 2018-10-23 DIAGNOSIS — E43 UNSPECIFIED SEVERE PROTEIN-CALORIE MALNUTRITION: ICD-10-CM

## 2018-10-23 DIAGNOSIS — Z66 DO NOT RESUSCITATE: ICD-10-CM

## 2018-10-23 DIAGNOSIS — N18.9 CHRONIC KIDNEY DISEASE, UNSPECIFIED: ICD-10-CM

## 2018-10-23 DIAGNOSIS — C79.51 SECONDARY MALIGNANT NEOPLASM OF BONE: ICD-10-CM

## 2018-10-23 DIAGNOSIS — E78.5 HYPERLIPIDEMIA, UNSPECIFIED: ICD-10-CM

## 2018-10-23 DIAGNOSIS — Z79.899 OTHER LONG TERM (CURRENT) DRUG THERAPY: ICD-10-CM

## 2018-10-23 DIAGNOSIS — C61 MALIGNANT NEOPLASM OF PROSTATE: ICD-10-CM

## 2018-10-23 DIAGNOSIS — Z51.5 ENCOUNTER FOR PALLIATIVE CARE: ICD-10-CM

## 2018-10-23 DIAGNOSIS — E11.22 TYPE 2 DIABETES MELLITUS WITH DIABETIC CHRONIC KIDNEY DISEASE: ICD-10-CM

## 2018-10-23 DIAGNOSIS — Z79.82 LONG TERM (CURRENT) USE OF ASPIRIN: ICD-10-CM

## 2018-10-23 DIAGNOSIS — I82.541 CHRONIC EMBOLISM AND THROMBOSIS OF RIGHT TIBIAL VEIN: ICD-10-CM

## 2018-10-23 DIAGNOSIS — Z85.46 PERSONAL HISTORY OF MALIGNANT NEOPLASM OF PROSTATE: ICD-10-CM

## 2018-10-23 DIAGNOSIS — H40.9 UNSPECIFIED GLAUCOMA: ICD-10-CM

## 2018-10-23 DIAGNOSIS — D63.0 ANEMIA IN NEOPLASTIC DISEASE: ICD-10-CM

## 2018-10-23 DIAGNOSIS — E53.8 DEFICIENCY OF OTHER SPECIFIED B GROUP VITAMINS: ICD-10-CM

## 2018-10-23 DIAGNOSIS — Z28.21 IMMUNIZATION NOT CARRIED OUT BECAUSE OF PATIENT REFUSAL: ICD-10-CM

## 2019-02-05 NOTE — PHYSICAL THERAPY INITIAL EVALUATION ADULT - PERSONAL SAFETY AND JUDGMENT, REHAB EVAL
Pt A&Ox4, VSS on RA. Up with SBA and gait belt. NPO except meds and ice chips. L foot heel and big toe ulcerated, black.  Podiatry and vascular following. LR running at 75. IV vanco. Freestyle scan for ,164, 133. Heel debridement scheduled for 1630. Will continue to monitor.      impaired/bed alarm

## 2019-02-20 NOTE — PATIENT PROFILE ADULT. - NS PRO ABUSE SCREEN SUSPICION NEGLECT YN
no Pt scheduled for cone biopsy on 3/6/2019.  labs done results pending, urine cup provided.  Preop teaching done, pt able to verbalize understanding

## 2019-06-08 ENCOUNTER — INPATIENT (INPATIENT)
Facility: HOSPITAL | Age: 84
LOS: 9 days | Discharge: NURSING, REHAB OR LONG-TERM CARE | End: 2019-06-18
Attending: INTERNAL MEDICINE | Admitting: INTERNAL MEDICINE
Payer: MEDICARE

## 2019-06-08 ENCOUNTER — RESULT REVIEW (OUTPATIENT)
Age: 84
End: 2019-06-08

## 2019-06-08 VITALS
HEART RATE: 93 BPM | SYSTOLIC BLOOD PRESSURE: 95 MMHG | DIASTOLIC BLOOD PRESSURE: 48 MMHG | WEIGHT: 104.06 LBS | RESPIRATION RATE: 18 BRPM | OXYGEN SATURATION: 99 % | TEMPERATURE: 98 F

## 2019-06-08 DIAGNOSIS — Z98.890 OTHER SPECIFIED POSTPROCEDURAL STATES: Chronic | ICD-10-CM

## 2019-06-08 DIAGNOSIS — E11.9 TYPE 2 DIABETES MELLITUS WITHOUT COMPLICATIONS: ICD-10-CM

## 2019-06-08 DIAGNOSIS — N18.9 CHRONIC KIDNEY DISEASE, UNSPECIFIED: ICD-10-CM

## 2019-06-08 DIAGNOSIS — C61 MALIGNANT NEOPLASM OF PROSTATE: ICD-10-CM

## 2019-06-08 DIAGNOSIS — R31.9 HEMATURIA, UNSPECIFIED: ICD-10-CM

## 2019-06-08 DIAGNOSIS — D64.9 ANEMIA, UNSPECIFIED: ICD-10-CM

## 2019-06-08 DIAGNOSIS — I10 ESSENTIAL (PRIMARY) HYPERTENSION: ICD-10-CM

## 2019-06-08 LAB
ALBUMIN SERPL ELPH-MCNC: 2.6 G/DL — LOW (ref 3.3–5)
ALP SERPL-CCNC: 1342 U/L — HIGH (ref 40–120)
ALT FLD-CCNC: 8 U/L — LOW (ref 12–78)
ANION GAP SERPL CALC-SCNC: 8 MMOL/L — SIGNIFICANT CHANGE UP (ref 5–17)
ANISOCYTOSIS BLD QL: SLIGHT — SIGNIFICANT CHANGE UP
APPEARANCE UR: ABNORMAL
APTT BLD: 28.3 SEC — SIGNIFICANT CHANGE UP (ref 27.5–36.3)
AST SERPL-CCNC: 6 U/L — LOW (ref 15–37)
BACTERIA # UR AUTO: ABNORMAL
BASOPHILS # BLD AUTO: 0.01 K/UL — SIGNIFICANT CHANGE UP (ref 0–0.2)
BASOPHILS NFR BLD AUTO: 0.1 % — SIGNIFICANT CHANGE UP (ref 0–2)
BILIRUB SERPL-MCNC: 0.5 MG/DL — SIGNIFICANT CHANGE UP (ref 0.2–1.2)
BILIRUB UR-MCNC: NEGATIVE — SIGNIFICANT CHANGE UP
BLD GP AB SCN SERPL QL: SIGNIFICANT CHANGE UP
BUN SERPL-MCNC: 37 MG/DL — HIGH (ref 7–23)
CALCIUM SERPL-MCNC: 8.7 MG/DL — SIGNIFICANT CHANGE UP (ref 8.5–10.1)
CHLORIDE SERPL-SCNC: 102 MMOL/L — SIGNIFICANT CHANGE UP (ref 96–108)
CO2 SERPL-SCNC: 25 MMOL/L — SIGNIFICANT CHANGE UP (ref 22–31)
COLOR SPEC: ABNORMAL
CREAT SERPL-MCNC: 1.18 MG/DL — SIGNIFICANT CHANGE UP (ref 0.5–1.3)
DIFF PNL FLD: ABNORMAL
EOSINOPHIL # BLD AUTO: 0.02 K/UL — SIGNIFICANT CHANGE UP (ref 0–0.5)
EOSINOPHIL NFR BLD AUTO: 0.3 % — SIGNIFICANT CHANGE UP (ref 0–6)
GLUCOSE BLDC GLUCOMTR-MCNC: 258 MG/DL — HIGH (ref 70–99)
GLUCOSE BLDC GLUCOMTR-MCNC: 59 MG/DL — LOW (ref 70–99)
GLUCOSE BLDC GLUCOMTR-MCNC: 61 MG/DL — LOW (ref 70–99)
GLUCOSE BLDC GLUCOMTR-MCNC: 76 MG/DL — SIGNIFICANT CHANGE UP (ref 70–99)
GLUCOSE BLDC GLUCOMTR-MCNC: 91 MG/DL — SIGNIFICANT CHANGE UP (ref 70–99)
GLUCOSE SERPL-MCNC: 317 MG/DL — HIGH (ref 70–99)
GLUCOSE UR QL: NEGATIVE MG/DL — SIGNIFICANT CHANGE UP
HCT VFR BLD CALC: 20.8 % — CRITICAL LOW (ref 39–50)
HCT VFR BLD CALC: 26.1 % — LOW (ref 39–50)
HGB BLD-MCNC: 6.3 G/DL — CRITICAL LOW (ref 13–17)
HGB BLD-MCNC: 8.1 G/DL — LOW (ref 13–17)
HYPOCHROMIA BLD QL: SLIGHT — SIGNIFICANT CHANGE UP
IMM GRANULOCYTES NFR BLD AUTO: 0.6 % — SIGNIFICANT CHANGE UP (ref 0–1.5)
INR BLD: 1.43 RATIO — HIGH (ref 0.88–1.16)
INR BLD: 1.46 RATIO — HIGH (ref 0.88–1.16)
KETONES UR-MCNC: ABNORMAL
LACTATE SERPL-SCNC: 1 MMOL/L — SIGNIFICANT CHANGE UP (ref 0.7–2)
LEUKOCYTE ESTERASE UR-ACNC: ABNORMAL
LYMPHOCYTES # BLD AUTO: 1.02 K/UL — SIGNIFICANT CHANGE UP (ref 1–3.3)
LYMPHOCYTES # BLD AUTO: 15 % — SIGNIFICANT CHANGE UP (ref 13–44)
MANUAL SMEAR VERIFICATION: YES — SIGNIFICANT CHANGE UP
MCHC RBC-ENTMCNC: 26.8 PG — LOW (ref 27–34)
MCHC RBC-ENTMCNC: 27.6 PG — SIGNIFICANT CHANGE UP (ref 27–34)
MCHC RBC-ENTMCNC: 30.3 GM/DL — LOW (ref 32–36)
MCHC RBC-ENTMCNC: 31 GM/DL — LOW (ref 32–36)
MCV RBC AUTO: 88.5 FL — SIGNIFICANT CHANGE UP (ref 80–100)
MCV RBC AUTO: 89.1 FL — SIGNIFICANT CHANGE UP (ref 80–100)
MICROCYTES BLD QL: SLIGHT — SIGNIFICANT CHANGE UP
MONOCYTES # BLD AUTO: 1.17 K/UL — HIGH (ref 0–0.9)
MONOCYTES NFR BLD AUTO: 17.2 % — HIGH (ref 2–14)
NEUTROPHILS # BLD AUTO: 4.56 K/UL — SIGNIFICANT CHANGE UP (ref 1.8–7.4)
NEUTROPHILS NFR BLD AUTO: 66.8 % — SIGNIFICANT CHANGE UP (ref 43–77)
NITRITE UR-MCNC: POSITIVE
NRBC # BLD: 0 /100 WBCS — SIGNIFICANT CHANGE UP (ref 0–0)
NRBC # BLD: 0 /100 WBCS — SIGNIFICANT CHANGE UP (ref 0–0)
PH UR: 5 — SIGNIFICANT CHANGE UP (ref 5–8)
PLAT MORPH BLD: NORMAL — SIGNIFICANT CHANGE UP
PLATELET # BLD AUTO: 183 K/UL — SIGNIFICANT CHANGE UP (ref 150–400)
PLATELET # BLD AUTO: 194 K/UL — SIGNIFICANT CHANGE UP (ref 150–400)
POTASSIUM SERPL-MCNC: 4.2 MMOL/L — SIGNIFICANT CHANGE UP (ref 3.5–5.3)
POTASSIUM SERPL-SCNC: 4.2 MMOL/L — SIGNIFICANT CHANGE UP (ref 3.5–5.3)
PROT SERPL-MCNC: 6.1 GM/DL — SIGNIFICANT CHANGE UP (ref 6–8.3)
PROT UR-MCNC: 500 MG/DL
PROTHROM AB SERPL-ACNC: 16.2 SEC — HIGH (ref 10–12.9)
PROTHROM AB SERPL-ACNC: 16.6 SEC — HIGH (ref 10–12.9)
RBC # BLD: 2.35 M/UL — LOW (ref 4.2–5.8)
RBC # BLD: 2.93 M/UL — LOW (ref 4.2–5.8)
RBC # FLD: 15.8 % — HIGH (ref 10.3–14.5)
RBC # FLD: 16.2 % — HIGH (ref 10.3–14.5)
RBC BLD AUTO: ABNORMAL
RBC CASTS # UR COMP ASSIST: >50 /HPF (ref 0–4)
SODIUM SERPL-SCNC: 135 MMOL/L — SIGNIFICANT CHANGE UP (ref 135–145)
SP GR SPEC: 1.01 — SIGNIFICANT CHANGE UP (ref 1.01–1.02)
T3 SERPL-MCNC: 38 NG/DL — LOW (ref 80–200)
T4 AB SER-ACNC: 4.1 UG/DL — LOW (ref 4.6–12)
TSH SERPL-MCNC: 0.99 UU/ML — SIGNIFICANT CHANGE UP (ref 0.36–3.74)
UROBILINOGEN FLD QL: NEGATIVE MG/DL — SIGNIFICANT CHANGE UP
WBC # BLD: 6.42 K/UL — SIGNIFICANT CHANGE UP (ref 3.8–10.5)
WBC # BLD: 6.82 K/UL — SIGNIFICANT CHANGE UP (ref 3.8–10.5)
WBC # FLD AUTO: 6.42 K/UL — SIGNIFICANT CHANGE UP (ref 3.8–10.5)
WBC # FLD AUTO: 6.82 K/UL — SIGNIFICANT CHANGE UP (ref 3.8–10.5)
WBC UR QL: >50

## 2019-06-08 PROCEDURE — 88112 CYTOPATH CELL ENHANCE TECH: CPT | Mod: 26

## 2019-06-08 PROCEDURE — 74177 CT ABD & PELVIS W/CONTRAST: CPT | Mod: 26

## 2019-06-08 PROCEDURE — 99285 EMERGENCY DEPT VISIT HI MDM: CPT

## 2019-06-08 RX ORDER — DORZOLAMIDE HYDROCHLORIDE TIMOLOL MALEATE 20; 5 MG/ML; MG/ML
1 SOLUTION/ DROPS OPHTHALMIC
Refills: 0 | Status: DISCONTINUED | OUTPATIENT
Start: 2019-06-08 | End: 2019-06-14

## 2019-06-08 RX ORDER — CICLOPIROX OLAMINE 7.7 MG/G
1 CREAM TOPICAL
Qty: 0 | Refills: 0 | DISCHARGE

## 2019-06-08 RX ORDER — NYSTATIN 500MM UNIT
2 POWDER (EA) MISCELLANEOUS
Qty: 0 | Refills: 0 | DISCHARGE

## 2019-06-08 RX ORDER — LATANOPROST 0.05 MG/ML
1 SOLUTION/ DROPS OPHTHALMIC; TOPICAL
Qty: 0 | Refills: 0 | DISCHARGE

## 2019-06-08 RX ORDER — DORZOLAMIDE HYDROCHLORIDE TIMOLOL MALEATE 20; 5 MG/ML; MG/ML
1 SOLUTION/ DROPS OPHTHALMIC
Qty: 0 | Refills: 0 | DISCHARGE

## 2019-06-08 RX ORDER — DEXTROSE 50 % IN WATER 50 %
25 SYRINGE (ML) INTRAVENOUS ONCE
Refills: 0 | Status: DISCONTINUED | OUTPATIENT
Start: 2019-06-08 | End: 2019-06-14

## 2019-06-08 RX ORDER — REPAGLINIDE 1 MG/1
0.5 TABLET ORAL THREE TIMES A DAY
Refills: 0 | Status: DISCONTINUED | OUTPATIENT
Start: 2019-06-08 | End: 2019-06-14

## 2019-06-08 RX ORDER — FERROUS SULFATE 325(65) MG
1 TABLET ORAL
Qty: 0 | Refills: 0 | DISCHARGE

## 2019-06-08 RX ORDER — CEFTRIAXONE 500 MG/1
1 INJECTION, POWDER, FOR SOLUTION INTRAMUSCULAR; INTRAVENOUS ONCE
Refills: 0 | Status: COMPLETED | OUTPATIENT
Start: 2019-06-08 | End: 2019-06-08

## 2019-06-08 RX ORDER — BICALUTAMIDE 50 MG/1
50 TABLET, FILM COATED ORAL DAILY
Refills: 0 | Status: DISCONTINUED | OUTPATIENT
Start: 2019-06-08 | End: 2019-06-14

## 2019-06-08 RX ORDER — PANTOPRAZOLE SODIUM 20 MG/1
40 TABLET, DELAYED RELEASE ORAL
Refills: 0 | Status: DISCONTINUED | OUTPATIENT
Start: 2019-06-08 | End: 2019-06-14

## 2019-06-08 RX ORDER — FERROUS SULFATE 325(65) MG
325 TABLET ORAL DAILY
Refills: 0 | Status: DISCONTINUED | OUTPATIENT
Start: 2019-06-08 | End: 2019-06-14

## 2019-06-08 RX ORDER — DEXTROSE 50 % IN WATER 50 %
15 SYRINGE (ML) INTRAVENOUS ONCE
Refills: 0 | Status: DISCONTINUED | OUTPATIENT
Start: 2019-06-08 | End: 2019-06-14

## 2019-06-08 RX ORDER — DEXTROSE 50 % IN WATER 50 %
15 SYRINGE (ML) INTRAVENOUS ONCE
Refills: 0 | Status: COMPLETED | OUTPATIENT
Start: 2019-06-08 | End: 2019-06-08

## 2019-06-08 RX ORDER — DEXTROSE 50 % IN WATER 50 %
12.5 SYRINGE (ML) INTRAVENOUS ONCE
Refills: 0 | Status: DISCONTINUED | OUTPATIENT
Start: 2019-06-08 | End: 2019-06-14

## 2019-06-08 RX ORDER — SODIUM CHLORIDE 9 MG/ML
1000 INJECTION INTRAMUSCULAR; INTRAVENOUS; SUBCUTANEOUS ONCE
Refills: 0 | Status: COMPLETED | OUTPATIENT
Start: 2019-06-08 | End: 2019-06-08

## 2019-06-08 RX ORDER — SODIUM CHLORIDE 9 MG/ML
1000 INJECTION, SOLUTION INTRAVENOUS
Refills: 0 | Status: DISCONTINUED | OUTPATIENT
Start: 2019-06-08 | End: 2019-06-14

## 2019-06-08 RX ORDER — INSULIN LISPRO 100/ML
VIAL (ML) SUBCUTANEOUS
Refills: 0 | Status: DISCONTINUED | OUTPATIENT
Start: 2019-06-08 | End: 2019-06-14

## 2019-06-08 RX ORDER — GLUCAGON INJECTION, SOLUTION 0.5 MG/.1ML
1 INJECTION, SOLUTION SUBCUTANEOUS ONCE
Refills: 0 | Status: DISCONTINUED | OUTPATIENT
Start: 2019-06-08 | End: 2019-06-14

## 2019-06-08 RX ORDER — LATANOPROST 0.05 MG/ML
1 SOLUTION/ DROPS OPHTHALMIC; TOPICAL AT BEDTIME
Refills: 0 | Status: DISCONTINUED | OUTPATIENT
Start: 2019-06-08 | End: 2019-06-14

## 2019-06-08 RX ADMIN — REPAGLINIDE 0.5 MILLIGRAM(S): 1 TABLET ORAL at 21:56

## 2019-06-08 RX ADMIN — Medication 3: at 11:46

## 2019-06-08 RX ADMIN — CEFTRIAXONE 100 GRAM(S): 500 INJECTION, POWDER, FOR SOLUTION INTRAMUSCULAR; INTRAVENOUS at 03:46

## 2019-06-08 RX ADMIN — DORZOLAMIDE HYDROCHLORIDE TIMOLOL MALEATE 1 DROP(S): 20; 5 SOLUTION/ DROPS OPHTHALMIC at 17:25

## 2019-06-08 RX ADMIN — LATANOPROST 1 DROP(S): 0.05 SOLUTION/ DROPS OPHTHALMIC; TOPICAL at 21:56

## 2019-06-08 RX ADMIN — Medication 15 GRAM(S): at 21:55

## 2019-06-08 RX ADMIN — SODIUM CHLORIDE 1000 MILLILITER(S): 9 INJECTION INTRAMUSCULAR; INTRAVENOUS; SUBCUTANEOUS at 07:43

## 2019-06-08 RX ADMIN — Medication 325 MILLIGRAM(S): at 11:47

## 2019-06-08 RX ADMIN — REPAGLINIDE 0.5 MILLIGRAM(S): 1 TABLET ORAL at 14:58

## 2019-06-08 NOTE — H&P ADULT - NSICDXPASTMEDICALHX_GEN_ALL_CORE_FT
PAST MEDICAL HISTORY:  CKD (chronic kidney disease)     Diabetes     Glaucoma     HTN (hypertension)     Hyperlipidemia     Prostate cancer

## 2019-06-08 NOTE — ED ADULT NURSE NOTE - NSIMPLEMENTINTERV_GEN_ALL_ED
Implemented All Fall with Harm Risk Interventions:  [place] to call system. Call bell, personal items and telephone within reach. Instruct patient to call for assistance. Room bathroom lighting operational. Non-slip footwear when patient is off stretcher. Physically safe environment: no spills, clutter or unnecessary equipment. Stretcher in lowest position, wheels locked, appropriate side rails in place. Provide visual cue, wrist band, yellow gown, etc. Monitor gait and stability. Monitor for mental status changes and reorient to person, place, and time. Review medications for side effects contributing to fall risk. Reinforce activity limits and safety measures with patient and family. Provide visual clues: red socks.

## 2019-06-08 NOTE — ED PROVIDER NOTE - OBJECTIVE STATEMENT
Pertinent PMH/PSH/FHx/SHx and Review of Systems contained within:  Patient presents to the ED for hematuria.  Patient comes from [facility] had been having blood in diaper and not urinating.  Patient seems pleasant, says that he is unable to urinate but denies any abdominal or flank pain.  Patient is on lovenox, also has prostate cancer.  Per chart, patient is DNR and only requires admission if he is to be transfused, last Hgb was 6.2 and patient hypotensive to 82/42 at [facility].  Per chart this is not the first time he's had hematuria and has known low hemoglobin.      ROS: No fever/chills, No headache/photophobia/eye pain/changes in vision, No ear pain/sore throat/dysphagia, No chest pain/palpitations, no SOB/cough/wheeze/stridor, No abdominal pain, No N/V/D/melena, No neck/back pain, no rash, no changes in neurological status/function.

## 2019-06-08 NOTE — CONSULT NOTE ADULT - SUBJECTIVE AND OBJECTIVE BOX
HPI:  patient  presented  with gross hematuria  difficulty  urinating  hypotension fatigue  at  [facility]  evaluated  in  ER  given  IVF  transfusion  started  admitted  for  further w/u  and  treatment (2019 07:20)    CT scan: Chronic Right hydronephrosis with blood in the renal pelvis      PAST MEDICAL & SURGICAL HISTORY:  CKD (chronic kidney disease)  Hyperlipidemia  Glaucoma  Prostate cancer s/p radiation Brachytherapy.  HTN (hypertension)  Diabetes  H/O abdominal surgery      Allergies    No Known Allergies    FAMILY HISTORY:  No pertinent family history in first degree relatives      Home Medications:  Aspirin Enteric Coated 325 mg oral delayed release tablet: 1 tab(s) orally once a day (2019 04:19)  ciclopirox 0.77% topical cream: Apply topically to affected area 2 times a day (2019 04:19)  docusate sodium 100 mg oral capsule: 1 cap(s) orally 2 times a day (2019 04:19)  dorzolamide-timolol 2.23%-0.68% ophthalmic solution: 1 drop(s) to each affected eye 2 times a day (2019 04:19)  enoxaparin: 40 milligram(s) subcutaneous once a day (2019 04:19)  ferrous sulfate 325 mg (65 mg elemental iron) oral tablet: 1 tab(s) orally once a day (2019 04:19)  latanoprost 0.005% ophthalmic solution: 1 drop(s) to each affected eye once a day (in the evening) (2019 04:19)  nystatin 100,000 units/g topical cream (obsolete): Apply topically to affected area 2 times a day (2019 04:19)  pantoprazole 40 mg oral delayed release tablet: 1 tab(s) orally once a day (before a meal) (2019 04:19)      MEDICATIONS  (STANDING):  dextrose 5%. 1000 milliLiter(s) (50 mL/Hr) IV Continuous <Continuous>  dextrose 50% Injectable 12.5 Gram(s) IV Push once  dextrose 50% Injectable 25 Gram(s) IV Push once  dextrose 50% Injectable 25 Gram(s) IV Push once  dorzolamide 2%/timolol 0.5% Ophthalmic Solution 1 Drop(s) Both EYES two times a day  ferrous    sulfate 325 milliGRAM(s) Oral daily  insulin lispro (HumaLOG) corrective regimen sliding scale   SubCutaneous three times a day before meals  latanoprost 0.005% Ophthalmic Solution 1 Drop(s) Both EYES at bedtime  pantoprazole    Tablet 40 milliGRAM(s) Oral before breakfast  repaglinide 0.5 milliGRAM(s) Oral three times a day    MEDICATIONS  (PRN):  dextrose 40% Gel 15 Gram(s) Oral once PRN Blood Glucose LESS THAN 70 milliGRAM(s)/deciliter  glucagon  Injectable 1 milliGRAM(s) IntraMuscular once PRN Glucose LESS THAN 70 milligrams/deciliter      ROS:    pt is confused, not oriented to date. Oriented to place.      Physical Exam:    Vital Signs:  Vital Signs Last 24 Hrs  T(C): 35.9 (2019 10:28), Max: 36.9 (2019 01:50)  T(F): 96.7 (2019 10:28), Max: 98.5 (2019 01:50)  HR: 75 (2019 10:28) (75 - 93)  BP: 119/72 (2019 10:28) (92/42 - 119/72)  BP(mean): --  RR: 19 (2019 10:28) (16 - 19)  SpO2: 99% (2019 10:28) (96% - 100%)  Daily     Daily Weight in k.3 (2019 10:28)  I&O's Summary      General:  Appears stated age,  well-nourished, no distress  HEENT:  NC/AT, patent nares w/ pink mucosa, OP moist and pink,   conjunctivae clear  Chest:  Full & symmetric excursion, no increased effort.   Abdomen:  Soft, non-tender, non-distended, normoactive bowel sounds.  Genitalia: Uncircumcised Phallus, Adequate meatus, no hypospadias. Both testicles descended, non tender, no mass. No epididymitis or epididymal mass. No hydrocele. Ibarra draining hematuric urine. Catheter in the urethra. Bladder not distended.  Rectal Examination: Deferred.  Extremities:  no edema, pedal pusation are present, no calf tenderness.  Skin:  No rash/erythema  Neuro/Psych:  Alert and conscious. Grossly intact and symmetrical.      LABS:                        6.3    6.82  )-----------( 194      ( 2019 03:39 )             20.8     06-08    135  |  102  |  37<H>  ----------------------------<  317<H>  4.2   |  25  |  1.18    Ca    8.7      2019 03:39    TPro  6.1  /  Alb  2.6<L>  /  TBili  0.5  /  DBili  x   /  AST  6<L>  /  ALT  8<L>  /  AlkPhos  1342<H>      PT/INR - ( 2019 08:32 )   PT: 16.6 sec;   INR: 1.46 ratio         PTT - ( 2019 03:39 )  PTT:28.3 sec  Urinalysis Basic - ( 2019 03:39 )    Color: Brown / Appearance: very cloudy / S.015 / pH: x  Gluc: x / Ketone: Trace  / Bili: Negative / Urobili: Negative mg/dL   Blood: x / Protein: 500 mg/dL / Nitrite: Positive   Leuk Esterase: Moderate / RBC: >50 /HPF / WBC >50   Sq Epi: x / Non Sq Epi: x / Bacteria: Many          RADIOLOGY & ADDITIONAL STUDIES:    < from: CT Abdomen and Pelvis w/ IV Cont (19 @ 05:30) >  EXAM:  CT ABDOMEN AND PELVIS IC                            PROCEDURE DATE:  2019          INTERPRETATION:  Abdominal/Pelvic CT    2019 5:33 AM    Indication: Hematuria    Technique: Axial images were obtained following IV contrast from the lung   bases through pubic symphysis.  85 cc of Omnipaque 350 was administered   intravenously without complication and 15 cc was discarded.  Reformatted   coronal and sagittal images are submitted.    Comparison: None    FINDINGS:    LUNG BASES:  There are no pleural effusions.  PERITONEUM:  There is no free air or focal collection.  No free fluid.  LIVER: Normal.  SPLEEN: Normal.  GALLBLADDER: Unremarkable.  BILIARY TREE: Unremarkable.  PANCREAS: Atrophic.  ADRENAL GLANDS: Normal.  KIDNEYS: Mild areas of left renal cortical scarring without   hydronephrosis. The right kidney demonstrates moderate cortical thinning.   There is severe right-sided hydronephrosis with blood products seen in   the right renal pelvis. There is a transition to mild to moderate   hydroureter at the ureteropelvic junction. There is diffuse right   urothelial enhancement without a visualized obstructing stone or mass.  BOWEL: The stomach is incompletely distended.  There is no small bowel   obstruction, focal bowel wall thickening or diverticulitis. The appendix   is unremarkable.    URINARY BLADDER: No normal urinary bladder is visualized. 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  PELVIC ORGANS: There is a Ibarra catheter with the balloon inflated into   the bulbous/membranous urethra.    There is no significant adenopathy.  VASCULATURE: Unremarkable.  RETROPERITONEUM:  There is no mass.  BONES: Heterogeneous sclerosis of the bones diffusely indicative of   osseous metastatic disease.  ABDOMINAL WALL: Bilateral asymmetric gynecomastia.    IMPRESSION:    Severe right hydronephrosis with blood products seen in the right renal   pelvis.  Moderate right renal cortical thinning indicative of chronic   obstruction.  Underlying malignancy cannot be excluded. Mild to moderate   right hydroureter with urothelial enhancement suggesting ascending   ureteritis.  The urinary bladder is inseparable from colon. 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 A colovesical fistula cannot be excluded.  Essentially normal appearance of the left kidney.  The Ibarra catheter balloon catheter is inflated in the bulbous/membranous   urethra below the pubic symphysis.                DEWAYNE DOYLE M.D, ATTENDING RADIOLOGIST  This document has been electronically signed. 2019  5:59AM

## 2019-06-08 NOTE — ED ADULT TRIAGE NOTE - CHIEF COMPLAINT QUOTE
Pt presents via ems for hematuria and low h/h from [facility]. Pt at baseline mentation. Offers no complaints. hypotensive in triage.

## 2019-06-08 NOTE — ED ADULT NURSE NOTE - CHIEF COMPLAINT QUOTE
Pt presents via ems for hematuria and low h/h from [facility]. Pt at baseline mentation. Offers no complaints. hypotensive in triage.

## 2019-06-08 NOTE — CONSULT NOTE ADULT - ASSESSMENT
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 Will start on Casodex    right chronic hydronephrosis with bleeding form right kidney.: Urine for cytology    urine culture     Ibarra catheter removed.    will measure PVR of urine

## 2019-06-08 NOTE — H&P ADULT - HISTORY OF PRESENT ILLNESS
patient  presented  with gross hematuria  difficulty  urinating  hypotension fatigue  at  [facility]  evaluated  in  ER  given  IVF  transfusion  started  admitted  for  further w/u  and  treatment

## 2019-06-08 NOTE — H&P ADULT - NSHPPHYSICALEXAM_GEN_ALL_CORE
PHYSICAL EXAM:     GENERAL:   thin  nAD  HEAD:  Atraumatic, Normocephalic  EYES: EOMI, PERRLA, conjunctiva and sclera clear  ENMT: No tonsillar erythema, exudates, or enlargement; Moist mucous membranes, , No lesions  NECK: Supple, No JVD, Normal thyroid  NERVOUS SYSTEM:  Alert & Oriented X2, ; no  focal  deficits  CHEST/LUNG: Clear  bilaterally; No rales, rhonchi, wheezing, or rubs  HEART: Regular rate and rhythm; No murmurs, rubs, or gallops  ABDOMEN: Soft, Nontender, Nondistended; no  masses Bowel sounds present  EXTREMITIES:  + Peripheral Pulses, No clubbing, cyanosis, or edema  LYMPH: No lymphadenopathy noted   RECTAL: deferred    SKIN: No rashes or lesions

## 2019-06-08 NOTE — ED PROVIDER NOTE - CLINICAL SUMMARY MEDICAL DECISION MAKING FREE TEXT BOX
Patient presents for hematuria, urinary retention.  VSS.  Ibarra cath was placed with difficulty, unable to advance further.  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  Labs show Hgb 6.3, patient verbally consents to transfusioUrology aware. Patient is to be admitted to the hospital and the case was discussed with the admitting physician.  Any changes in plan, additional imaging/labs, and further work up will be at the discretion of the admitting physician.

## 2019-06-08 NOTE — H&P ADULT - NSHPLABSRESULTS_GEN_ALL_CORE
LABS:                        6.3    6.82  )-----------( 194      ( 2019 03:39 )             20.8         135  |  102  |  37<H>  ----------------------------<  317<H>  4.2   |  25  |  1.18    Ca    8.7      2019 03:39    TPro  6.1  /  Alb  2.6<L>  /  TBili  0.5  /  DBili  x   /  AST  6<L>  /  ALT  8<L>  /  AlkPhos  1342<H>      PT/INR - ( 2019 03:39 )   PT: 16.2 sec;   INR: 1.43 ratio         PTT - ( 2019 03:39 )  PTT:28.3 sec  Urinalysis Basic - ( 2019 03:39 )    Color: Brown / Appearance: very cloudy / S.015 / pH: x  Gluc: x / Ketone: Trace  / Bili: Negative / Urobili: Negative mg/dL   Blood: x / Protein: 500 mg/dL / Nitrite: Positive   Leuk Esterase: Moderate / RBC: >50 /HPF / WBC >50   Sq Epi: x / Non Sq Epi: x / Bacteria: Many

## 2019-06-08 NOTE — ED PROVIDER NOTE - PHYSICAL EXAMINATION
Gen: Alert, NAD, we  Head: NC, AT, PERRL, EOMI, normal lids/conjunctiva  ENT: normal hearing, patent oropharynx without erythema/exudate, uvula midline  Neck: +supple, no tenderness/meningismus/JVD, +Trachea midline  Pulm: Bilateral BS, normal resp effort, no wheeze/stridor/retractions  CV: RRR, no M/R/G, +dist pulses  Abd: soft, NT/ND, +BS, no palpable masses  Mskel: no edema/erythema/cyanosis  Skin: no rash, warm/dry  Neuro: AAOx3, no apparent sensory/motor deficits, coordination intact Gen: Alert, NAD, frail   Head: NC, AT, PERRL, EOMI, normal lids/conjunctiva  ENT: normal hearing, patent oropharynx without erythema/exudate, uvula midline  Neck: +supple, no tenderness/meningismus/JVD, +Trachea midline  Pulm: Bilateral BS, normal resp effort, no wheeze/stridor/retractions  CV: RRR, no M/R/G, +dist pulses  Abd: soft, NT/ND, +BS, no palpable masses  Mskel: no edema/erythema/cyanosis  Skin: no rash, warm/dry  Neuro: AAOx3, no apparent sensory/motor deficits, coordination intact

## 2019-06-09 LAB
ALBUMIN SERPL ELPH-MCNC: 2.5 G/DL — LOW (ref 3.3–5)
ALP SERPL-CCNC: 1145 U/L — HIGH (ref 40–120)
ALT FLD-CCNC: 8 U/L — LOW (ref 12–78)
ANION GAP SERPL CALC-SCNC: 7 MMOL/L — SIGNIFICANT CHANGE UP (ref 5–17)
AST SERPL-CCNC: 11 U/L — LOW (ref 15–37)
BILIRUB SERPL-MCNC: 0.5 MG/DL — SIGNIFICANT CHANGE UP (ref 0.2–1.2)
BUN SERPL-MCNC: 37 MG/DL — HIGH (ref 7–23)
CALCIUM SERPL-MCNC: 8.5 MG/DL — SIGNIFICANT CHANGE UP (ref 8.5–10.1)
CHLORIDE SERPL-SCNC: 105 MMOL/L — SIGNIFICANT CHANGE UP (ref 96–108)
CO2 SERPL-SCNC: 24 MMOL/L — SIGNIFICANT CHANGE UP (ref 22–31)
CREAT SERPL-MCNC: 0.93 MG/DL — SIGNIFICANT CHANGE UP (ref 0.5–1.3)
CULTURE RESULTS: SIGNIFICANT CHANGE UP
FOLATE SERPL-MCNC: >20 NG/ML — SIGNIFICANT CHANGE UP
GLUCOSE BLDC GLUCOMTR-MCNC: 153 MG/DL — HIGH (ref 70–99)
GLUCOSE BLDC GLUCOMTR-MCNC: 186 MG/DL — HIGH (ref 70–99)
GLUCOSE BLDC GLUCOMTR-MCNC: 44 MG/DL — CRITICAL LOW (ref 70–99)
GLUCOSE BLDC GLUCOMTR-MCNC: 54 MG/DL — LOW (ref 70–99)
GLUCOSE BLDC GLUCOMTR-MCNC: 69 MG/DL — LOW (ref 70–99)
GLUCOSE BLDC GLUCOMTR-MCNC: 76 MG/DL — SIGNIFICANT CHANGE UP (ref 70–99)
GLUCOSE BLDC GLUCOMTR-MCNC: 99 MG/DL — SIGNIFICANT CHANGE UP (ref 70–99)
GLUCOSE SERPL-MCNC: 47 MG/DL — LOW (ref 70–99)
HBA1C BLD-MCNC: 9.5 % — HIGH (ref 4–5.6)
HCT VFR BLD CALC: 24.2 % — LOW (ref 39–50)
HCT VFR BLD CALC: 27.3 % — LOW (ref 39–50)
HGB BLD-MCNC: 7.6 G/DL — LOW (ref 13–17)
HGB BLD-MCNC: 8.6 G/DL — LOW (ref 13–17)
IRON SATN MFR SERPL: 28 % — SIGNIFICANT CHANGE UP (ref 16–55)
IRON SATN MFR SERPL: 46 UG/DL — SIGNIFICANT CHANGE UP (ref 45–165)
MCHC RBC-ENTMCNC: 27.3 PG — SIGNIFICANT CHANGE UP (ref 27–34)
MCHC RBC-ENTMCNC: 27.3 PG — SIGNIFICANT CHANGE UP (ref 27–34)
MCHC RBC-ENTMCNC: 31.4 GM/DL — LOW (ref 32–36)
MCHC RBC-ENTMCNC: 31.5 GM/DL — LOW (ref 32–36)
MCV RBC AUTO: 86.7 FL — SIGNIFICANT CHANGE UP (ref 80–100)
MCV RBC AUTO: 87.1 FL — SIGNIFICANT CHANGE UP (ref 80–100)
NRBC # BLD: 0 /100 WBCS — SIGNIFICANT CHANGE UP (ref 0–0)
NRBC # BLD: 0 /100 WBCS — SIGNIFICANT CHANGE UP (ref 0–0)
PLATELET # BLD AUTO: 202 K/UL — SIGNIFICANT CHANGE UP (ref 150–400)
PLATELET # BLD AUTO: 206 K/UL — SIGNIFICANT CHANGE UP (ref 150–400)
POTASSIUM SERPL-MCNC: 3.8 MMOL/L — SIGNIFICANT CHANGE UP (ref 3.5–5.3)
POTASSIUM SERPL-SCNC: 3.8 MMOL/L — SIGNIFICANT CHANGE UP (ref 3.5–5.3)
PROT SERPL-MCNC: 5.9 GM/DL — LOW (ref 6–8.3)
PSA FLD-MCNC: 157 NG/ML — HIGH (ref 0–4)
RBC # BLD: 2.78 M/UL — LOW (ref 4.2–5.8)
RBC # BLD: 3.15 M/UL — LOW (ref 4.2–5.8)
RBC # FLD: 16.4 % — HIGH (ref 10.3–14.5)
RBC # FLD: 16.4 % — HIGH (ref 10.3–14.5)
SODIUM SERPL-SCNC: 136 MMOL/L — SIGNIFICANT CHANGE UP (ref 135–145)
SPECIMEN SOURCE: SIGNIFICANT CHANGE UP
TIBC SERPL-MCNC: 167 UG/DL — LOW (ref 220–430)
UIBC SERPL-MCNC: 121 UG/DL — SIGNIFICANT CHANGE UP (ref 110–370)
VIT B12 SERPL-MCNC: 673 PG/ML — SIGNIFICANT CHANGE UP (ref 232–1245)
WBC # BLD: 4.31 K/UL — SIGNIFICANT CHANGE UP (ref 3.8–10.5)
WBC # BLD: 4.31 K/UL — SIGNIFICANT CHANGE UP (ref 3.8–10.5)
WBC # FLD AUTO: 4.31 K/UL — SIGNIFICANT CHANGE UP (ref 3.8–10.5)
WBC # FLD AUTO: 4.31 K/UL — SIGNIFICANT CHANGE UP (ref 3.8–10.5)

## 2019-06-09 RX ORDER — SODIUM CHLORIDE 9 MG/ML
1000 INJECTION INTRAMUSCULAR; INTRAVENOUS; SUBCUTANEOUS
Refills: 0 | Status: DISCONTINUED | OUTPATIENT
Start: 2019-06-09 | End: 2019-06-18

## 2019-06-09 RX ORDER — DEXTROSE 50 % IN WATER 50 %
25 SYRINGE (ML) INTRAVENOUS ONCE
Refills: 0 | Status: COMPLETED | OUTPATIENT
Start: 2019-06-09 | End: 2019-06-09

## 2019-06-09 RX ORDER — MIDODRINE HYDROCHLORIDE 2.5 MG/1
5 TABLET ORAL THREE TIMES A DAY
Refills: 0 | Status: DISCONTINUED | OUTPATIENT
Start: 2019-06-09 | End: 2019-06-14

## 2019-06-09 RX ADMIN — BICALUTAMIDE 50 MILLIGRAM(S): 50 TABLET, FILM COATED ORAL at 12:20

## 2019-06-09 RX ADMIN — Medication 25 GRAM(S): at 08:06

## 2019-06-09 RX ADMIN — DORZOLAMIDE HYDROCHLORIDE TIMOLOL MALEATE 1 DROP(S): 20; 5 SOLUTION/ DROPS OPHTHALMIC at 05:21

## 2019-06-09 RX ADMIN — Medication 325 MILLIGRAM(S): at 12:20

## 2019-06-09 RX ADMIN — Medication 1: at 12:20

## 2019-06-09 RX ADMIN — REPAGLINIDE 0.5 MILLIGRAM(S): 1 TABLET ORAL at 14:13

## 2019-06-09 RX ADMIN — SODIUM CHLORIDE 250 MILLILITER(S): 9 INJECTION INTRAMUSCULAR; INTRAVENOUS; SUBCUTANEOUS at 08:06

## 2019-06-09 RX ADMIN — PANTOPRAZOLE SODIUM 40 MILLIGRAM(S): 20 TABLET, DELAYED RELEASE ORAL at 08:07

## 2019-06-09 RX ADMIN — DORZOLAMIDE HYDROCHLORIDE TIMOLOL MALEATE 1 DROP(S): 20; 5 SOLUTION/ DROPS OPHTHALMIC at 17:49

## 2019-06-09 RX ADMIN — REPAGLINIDE 0.5 MILLIGRAM(S): 1 TABLET ORAL at 22:16

## 2019-06-09 RX ADMIN — LATANOPROST 1 DROP(S): 0.05 SOLUTION/ DROPS OPHTHALMIC; TOPICAL at 22:16

## 2019-06-09 NOTE — PROGRESS NOTE ADULT - SUBJECTIVE AND OBJECTIVE BOX
INTERVAL HPI/OVERNIGHT EVENTS:    continues  with  hematuria    REVIEW OF SYSTEMS:  CONSTITUTIONAL:  no  complaints    NECK: No pain or stiffnes  RESPIRATORY: No SOB   CARDIOVASCULAR: No chest pain, palpitations, dizziness,   GASTROINTESTINAL: No abdominal pain. No nausea, vomiting,   NEUROLOGICAL: No headaches, no  blurry  vision no  dizziness  SKIN: No itching,   MUSCULOSKELETAL: No pain    MEDICATION:  bicalutamide 50 milliGRAM(s) Oral daily  dextrose 40% Gel 15 Gram(s) Oral once PRN  dextrose 5%. 1000 milliLiter(s) IV Continuous <Continuous>  dextrose 50% Injectable 12.5 Gram(s) IV Push once  dextrose 50% Injectable 25 Gram(s) IV Push once  dextrose 50% Injectable 25 Gram(s) IV Push once  dorzolamide 2%/timolol 0.5% Ophthalmic Solution 1 Drop(s) Both EYES two times a day  ferrous    sulfate 325 milliGRAM(s) Oral daily  glucagon  Injectable 1 milliGRAM(s) IntraMuscular once PRN  insulin lispro (HumaLOG) corrective regimen sliding scale   SubCutaneous three times a day before meals  latanoprost 0.005% Ophthalmic Solution 1 Drop(s) Both EYES at bedtime  midodrine 5 milliGRAM(s) Oral three times a day  pantoprazole    Tablet 40 milliGRAM(s) Oral before breakfast  repaglinide 0.5 milliGRAM(s) Oral three times a day  sodium chloride 0.9%. 1000 milliLiter(s) IV Continuous <Continuous>    Vital Signs Last 24 Hrs  T(C): 35.1 (2019 00:20), Max: 36.8 (2019 17:20)  T(F): 95.1 (2019 00:20), Max: 98.2 (2019 17:20)  HR: 54 (2019 06:13) (54 - 83)  BP: 88/39 (2019 06:13) (88/39 - 122/66)  BP(mean): --  RR: 18 (2019 06:13) (18 - 19)  SpO2: 98% (2019 06:13) (98% - 100%)    PHYSICAL EXAM:  GENERAL: NAD, well-groomed, well-developed  EYES:  conjunctiva and sclera clear  ENMT:  Moist mucous membranes,   NECK: Supple, No JVD, Normal thyroid  NERVOUS SYSTEM:  Alert confused   no  focal  deficits;   CHEST/LUNG: Clear    HEART: Regular rate and rhythm; No murmurs, rubs, or gallops  ABDOMEN: Soft, Nontender, Nondistended; Bowel sounds present  EXTREMITIES:  no  edema no  tenderness  SKIN: No rashes   LABS:                        7.6    4.31  )-----------( 206      ( 2019 07:24 )             24.2         136  |  105  |  37<H>  ----------------------------<  47<L>  3.8   |  24  |  0.93    Ca    8.5      2019 07:24    TPro  5.9<L>  /  Alb  2.5<L>  /  TBili  0.5  /  DBili  x   /  AST  11<L>  /  ALT  8<L>  /  AlkPhos  1145<H>      PT/INR - ( 2019 08:32 )   PT: 16.6 sec;   INR: 1.46 ratio         PTT - ( 2019 03:39 )  PTT:28.3 sec  Urinalysis Basic - ( 2019 03:39 )    Color: Brown / Appearance: very cloudy / S.015 / pH: x  Gluc: x / Ketone: Trace  / Bili: Negative / Urobili: Negative mg/dL   Blood: x / Protein: 500 mg/dL / Nitrite: Positive   Leuk Esterase: Moderate / RBC: >50 /HPF / WBC >50   Sq Epi: x / Non Sq Epi: x / Bacteria: Many      CAPILLARY BLOOD GLUCOSE      POCT Blood Glucose.: 153 mg/dL (2019 09:22)  POCT Blood Glucose.: 54 mg/dL (2019 07:55)  POCT Blood Glucose.: 44 mg/dL (2019 07:30)  POCT Blood Glucose.: 76 mg/dL (2019 22:23)  POCT Blood Glucose.: 61 mg/dL (2019 21:52)  POCT Blood Glucose.: 59 mg/dL (2019 21:47)  POCT Blood Glucose.: 91 mg/dL (2019 16:33)  POCT Blood Glucose.: 258 mg/dL (2019 11:15)      RADIOLOGY & ADDITIONAL TESTS:    Imaging reports  Personally Reviewed:  [x ] YES  [ ] NO    Consultant(s) Notes Reviewed:  [ x] YES  [ ] NO    Care Discussed with Consultants/Other Providers [ x] YES  [ ] NO  Assessment and Plan:   Problem/Plan - 1:  ·  Problem: Symptomatic anemia.  Plan: IVF  monitor  labs    Problem/Plan - 2:  ·  Problem: Hematuria.  Plan: as  above  urology  eval.     Problem/Plan - 3:  ·  Problem: Diabetes.  Plan: fs  monitor.     Problem/Plan - 4:  ·  Problem: Prostate cancer.  Plan: observation. on  casidex    Problem/Plan - 5:  ·  Problem: CKD (chronic kidney disease).  Plan: hydration.     Problem/Plan - 6:  Problem: HTN (hypertension). Plan: observation  off  meds patient  hypotensive.

## 2019-06-09 NOTE — PROGRESS NOTE ADULT - SUBJECTIVE AND OBJECTIVE BOX
Patient seen and examined bedside resting comfortably.  Confused  Has [place] catheter on, Per RN, blood tinged urine  Denies nausea and vomiting. Tolerating diet.  Denies chest pain, dyspnea, cough.    T(F): 94.5 (06-09-19 @ 12:02), Max: 98.2 (06-08-19 @ 17:20)  HR: 90 (06-09-19 @ 12:02) (54 - 90)  BP: 146/90 (06-09-19 @ 12:02) (88/39 - 146/90)  RR: 18 (06-09-19 @ 12:02) (18 - 18)  SpO2: 100% (06-09-19 @ 12:02) (98% - 100%)  Wt(kg): --  CAPILLARY BLOOD GLUCOSE      POCT Blood Glucose.: 99 mg/dL (09 Jun 2019 15:44)  POCT Blood Glucose.: 186 mg/dL (09 Jun 2019 11:30)  POCT Blood Glucose.: 153 mg/dL (09 Jun 2019 09:22)  POCT Blood Glucose.: 54 mg/dL (09 Jun 2019 07:55)  POCT Blood Glucose.: 44 mg/dL (09 Jun 2019 07:30)  POCT Blood Glucose.: 76 mg/dL (08 Jun 2019 22:23)  POCT Blood Glucose.: 61 mg/dL (08 Jun 2019 21:52)  POCT Blood Glucose.: 59 mg/dL (08 Jun 2019 21:47)  POCT Blood Glucose.: 91 mg/dL (08 Jun 2019 16:33)      PHYSICAL EXAM:  General: NAD, alert and awake  HEENT: NCAT, EOMI, conjunctiva clear  Chest: nonlabored respirations, good inspiratory effort  Abdomen: soft, NTND.   Extremities: no pedal edema or calf tenderness noted   : No CVA or SP tenderness. Uncircumcised Phallus, Adequate meatus, no hypospadias    LABS:                        8.6    4.31  )-----------( 202      ( 09 Jun 2019 15:19 )             27.3   06-09    136  |  105  |  37<H>  ----------------------------<  47<L>  3.8   |  24  |  0.93    Ca    8.5      09 Jun 2019 07:24    TPro  5.9<L>  /  Alb  2.5<L>  /  TBili  0.5  /  DBili  x   /  AST  11<L>  /  ALT  8<L>  /  AlkPhos  1145<H>  06-09  PT/INR - ( 08 Jun 2019 08:32 )   PT: 16.6 sec;   INR: 1.46 ratio         PTT - ( 08 Jun 2019 03:39 )  PTT:28.3 sec  I&O's Detail    08 Jun 2019 07:01  -  09 Jun 2019 07:00  --------------------------------------------------------  IN:    Oral Fluid: 420 mL  Total IN: 420 mL    OUT:  Total OUT: 0 mL    Total NET: 420 mL          Impression: 89y Male admitted with 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

## 2019-06-09 NOTE — PHARMACY COMMUNICATION NOTE - COMMENTS
Medication was suspended from last night and called PA today to unsuspend. PA agreed to give medication.

## 2019-06-10 ENCOUNTER — TRANSCRIPTION ENCOUNTER (OUTPATIENT)
Age: 84
End: 2019-06-10

## 2019-06-10 LAB
ANION GAP SERPL CALC-SCNC: 8 MMOL/L — SIGNIFICANT CHANGE UP (ref 5–17)
BLD GP AB SCN SERPL QL: SIGNIFICANT CHANGE UP
BUN SERPL-MCNC: 40 MG/DL — HIGH (ref 7–23)
CALCIUM SERPL-MCNC: 8.6 MG/DL — SIGNIFICANT CHANGE UP (ref 8.5–10.1)
CHLORIDE SERPL-SCNC: 105 MMOL/L — SIGNIFICANT CHANGE UP (ref 96–108)
CO2 SERPL-SCNC: 23 MMOL/L — SIGNIFICANT CHANGE UP (ref 22–31)
CREAT SERPL-MCNC: 1.09 MG/DL — SIGNIFICANT CHANGE UP (ref 0.5–1.3)
GLUCOSE BLDC GLUCOMTR-MCNC: 128 MG/DL — HIGH (ref 70–99)
GLUCOSE BLDC GLUCOMTR-MCNC: 285 MG/DL — HIGH (ref 70–99)
GLUCOSE BLDC GLUCOMTR-MCNC: 54 MG/DL — LOW (ref 70–99)
GLUCOSE BLDC GLUCOMTR-MCNC: 56 MG/DL — LOW (ref 70–99)
GLUCOSE BLDC GLUCOMTR-MCNC: 57 MG/DL — LOW (ref 70–99)
GLUCOSE BLDC GLUCOMTR-MCNC: 61 MG/DL — LOW (ref 70–99)
GLUCOSE BLDC GLUCOMTR-MCNC: 68 MG/DL — LOW (ref 70–99)
GLUCOSE BLDC GLUCOMTR-MCNC: 83 MG/DL — SIGNIFICANT CHANGE UP (ref 70–99)
GLUCOSE SERPL-MCNC: 83 MG/DL — SIGNIFICANT CHANGE UP (ref 70–99)
HCT VFR BLD CALC: 26.1 % — LOW (ref 39–50)
HGB BLD-MCNC: 8.1 G/DL — LOW (ref 13–17)
MCHC RBC-ENTMCNC: 27 PG — SIGNIFICANT CHANGE UP (ref 27–34)
MCHC RBC-ENTMCNC: 31 GM/DL — LOW (ref 32–36)
MCV RBC AUTO: 87 FL — SIGNIFICANT CHANGE UP (ref 80–100)
NRBC # BLD: 0 /100 WBCS — SIGNIFICANT CHANGE UP (ref 0–0)
PLATELET # BLD AUTO: 197 K/UL — SIGNIFICANT CHANGE UP (ref 150–400)
POTASSIUM SERPL-MCNC: 3.5 MMOL/L — SIGNIFICANT CHANGE UP (ref 3.5–5.3)
POTASSIUM SERPL-SCNC: 3.5 MMOL/L — SIGNIFICANT CHANGE UP (ref 3.5–5.3)
RBC # BLD: 3 M/UL — LOW (ref 4.2–5.8)
RBC # FLD: 16 % — HIGH (ref 10.3–14.5)
SODIUM SERPL-SCNC: 136 MMOL/L — SIGNIFICANT CHANGE UP (ref 135–145)
WBC # BLD: 4.45 K/UL — SIGNIFICANT CHANGE UP (ref 3.8–10.5)
WBC # FLD AUTO: 4.45 K/UL — SIGNIFICANT CHANGE UP (ref 3.8–10.5)

## 2019-06-10 RX ORDER — MIDODRINE HYDROCHLORIDE 2.5 MG/1
1 TABLET ORAL
Qty: 0 | Refills: 0 | DISCHARGE
Start: 2019-06-10

## 2019-06-10 RX ORDER — DEXTROSE 50 % IN WATER 50 %
15 SYRINGE (ML) INTRAVENOUS ONCE
Refills: 0 | Status: COMPLETED | OUTPATIENT
Start: 2019-06-10 | End: 2019-06-10

## 2019-06-10 RX ORDER — ASPIRIN/CALCIUM CARB/MAGNESIUM 324 MG
1 TABLET ORAL
Qty: 0 | Refills: 0 | DISCHARGE

## 2019-06-10 RX ORDER — BICALUTAMIDE 50 MG/1
1 TABLET, FILM COATED ORAL
Qty: 0 | Refills: 0 | DISCHARGE
Start: 2019-06-10

## 2019-06-10 RX ORDER — DEXTROSE 50 % IN WATER 50 %
12.5 SYRINGE (ML) INTRAVENOUS ONCE
Refills: 0 | Status: COMPLETED | OUTPATIENT
Start: 2019-06-10 | End: 2019-06-10

## 2019-06-10 RX ADMIN — PANTOPRAZOLE SODIUM 40 MILLIGRAM(S): 20 TABLET, DELAYED RELEASE ORAL at 06:06

## 2019-06-10 RX ADMIN — REPAGLINIDE 0.5 MILLIGRAM(S): 1 TABLET ORAL at 21:37

## 2019-06-10 RX ADMIN — Medication 12.5 GRAM(S): at 10:40

## 2019-06-10 RX ADMIN — Medication 15 GRAM(S): at 08:49

## 2019-06-10 RX ADMIN — DORZOLAMIDE HYDROCHLORIDE TIMOLOL MALEATE 1 DROP(S): 20; 5 SOLUTION/ DROPS OPHTHALMIC at 17:48

## 2019-06-10 RX ADMIN — LATANOPROST 1 DROP(S): 0.05 SOLUTION/ DROPS OPHTHALMIC; TOPICAL at 21:37

## 2019-06-10 RX ADMIN — DORZOLAMIDE HYDROCHLORIDE TIMOLOL MALEATE 1 DROP(S): 20; 5 SOLUTION/ DROPS OPHTHALMIC at 06:07

## 2019-06-10 RX ADMIN — REPAGLINIDE 0.5 MILLIGRAM(S): 1 TABLET ORAL at 06:06

## 2019-06-10 RX ADMIN — BICALUTAMIDE 50 MILLIGRAM(S): 50 TABLET, FILM COATED ORAL at 11:35

## 2019-06-10 RX ADMIN — REPAGLINIDE 0.5 MILLIGRAM(S): 1 TABLET ORAL at 13:50

## 2019-06-10 RX ADMIN — MIDODRINE HYDROCHLORIDE 5 MILLIGRAM(S): 2.5 TABLET ORAL at 21:37

## 2019-06-10 RX ADMIN — Medication 325 MILLIGRAM(S): at 11:35

## 2019-06-10 RX ADMIN — Medication 3: at 11:35

## 2019-06-10 RX ADMIN — MIDODRINE HYDROCHLORIDE 5 MILLIGRAM(S): 2.5 TABLET ORAL at 13:50

## 2019-06-10 NOTE — DISCHARGE NOTE PROVIDER - HOSPITAL COURSE
patient  treated  with IVF  transfused  2  UPRBCs asa and  lovenox  d/cd   evaluated  by  Urology started  on  casodex  was  also  started  on  midrodine  for  hypotension  clinically  improved  appetite  good  discharged    back  to  [facility] patient  treated  with IVF  transfused  2  UPRBCs asa and  lovenox  d/cd   evaluated  by  Urology started  on  casodex  was  also  started  on  midrodine  for  hypotension  underwent Cystoscopy  TURB    steen  was found  to  rt  ureteral  obstruction  nephrostomy  by  IR  entertained  was  not  done  as  patient  with  stble  renal  function H/H  urinating  well    to  have  PVR  assesment  if  no  significant  residual  patient  to  go  back  to NH  if  not  to  have  suprapubic  cathater  placement

## 2019-06-10 NOTE — PHYSICAL THERAPY INITIAL EVALUATION ADULT - TRANSFER TRAINING, PT EVAL
Pt will be able to do sit to stand , stand pivot transfer using rolling walker with CGA in 2 to 3 weeks

## 2019-06-10 NOTE — PROGRESS NOTE ADULT - SUBJECTIVE AND OBJECTIVE BOX
INTERVAL HPI/OVERNIGHT EVENTS:        REVIEW OF SYSTEMS:  CONSTITUTIONAL: feell  great!  no  complaints appetite  good  this  am    NECK: No pain or stiffnes  RESPIRATORY: No SOB   CARDIOVASCULAR: No chest pain, palpitations, dizziness,   GASTROINTESTINAL: No abdominal pain. No nausea, vomiting,   NEUROLOGICAL: No headaches, no  blurry  vision no  dizziness  SKIN: No itching,   MUSCULOSKELETAL: No pain    MEDICATION:  bicalutamide 50 milliGRAM(s) Oral daily  dextrose 40% Gel 15 Gram(s) Oral once PRN  dextrose 5%. 1000 milliLiter(s) IV Continuous <Continuous>  dextrose 50% Injectable 12.5 Gram(s) IV Push once  dextrose 50% Injectable 25 Gram(s) IV Push once  dextrose 50% Injectable 25 Gram(s) IV Push once  dorzolamide 2%/timolol 0.5% Ophthalmic Solution 1 Drop(s) Both EYES two times a day  ferrous    sulfate 325 milliGRAM(s) Oral daily  glucagon  Injectable 1 milliGRAM(s) IntraMuscular once PRN  insulin lispro (HumaLOG) corrective regimen sliding scale   SubCutaneous three times a day before meals  latanoprost 0.005% Ophthalmic Solution 1 Drop(s) Both EYES at bedtime  midodrine 5 milliGRAM(s) Oral three times a day  pantoprazole    Tablet 40 milliGRAM(s) Oral before breakfast  repaglinide 0.5 milliGRAM(s) Oral three times a day  sodium chloride 0.9%. 1000 milliLiter(s) IV Continuous <Continuous>    Vital Signs Last 24 Hrs  T(C): 35.1 (10 Kevin 2019 05:19), Max: 36.1 (09 Jun 2019 23:45)  T(F): 95.2 (10 Kevin 2019 05:19), Max: 96.9 (09 Jun 2019 23:45)  HR: 85 (10 Kevin 2019 05:19) (73 - 90)  BP: 133/63 (10 Kevin 2019 05:19) (116/66 - 146/90)  BP(mean): --  RR: 18 (10 Kevin 2019 05:19) (17 - 18)  SpO2: 100% (10 Kevin 2019 05:19) (96% - 100%)    PHYSICAL EXAM:  GENERAL: NAD, well-groomed, well-developed  EYES:  conjunctiva and sclera clear  ENMT:  Moist mucous membranes,   NECK: Supple, No JVD, Normal thyroid  NERVOUS SYSTEM:  Alert oriented   no  focal  deficits;   CHEST/LUNG: Clear    HEART: Regular rate and rhythm; No murmurs, rubs, or gallops  ABDOMEN: Soft, Nontender, Nondistended; Bowel sounds present  EXTREMITIES:  no  edema no  tenderness  SKIN: No rashes   LABS:                        8.1    4.45  )-----------( 197      ( 10 Kevin 2019 07:20 )             26.1     06-10    136  |  105  |  40<H>  ----------------------------<  83  3.5   |  23  |  1.09    Ca    8.6      10 Kevin 2019 07:20    TPro  5.9<L>  /  Alb  2.5<L>  /  TBili  0.5  /  DBili  x   /  AST  11<L>  /  ALT  8<L>  /  AlkPhos  1145<H>  06-09        CAPILLARY BLOOD GLUCOSE      POCT Blood Glucose.: 61 mg/dL (10 Kevin 2019 08:17)  POCT Blood Glucose.: 56 mg/dL (10 Kevin 2019 08:16)  POCT Blood Glucose.: 57 mg/dL (10 Kevin 2019 07:50)  POCT Blood Glucose.: 54 mg/dL (10 Kevin 2019 07:48)  POCT Blood Glucose.: 76 mg/dL (09 Jun 2019 22:09)  POCT Blood Glucose.: 69 mg/dL (09 Jun 2019 22:05)  POCT Blood Glucose.: 99 mg/dL (09 Jun 2019 15:44)  POCT Blood Glucose.: 186 mg/dL (09 Jun 2019 11:30)  POCT Blood Glucose.: 153 mg/dL (09 Jun 2019 09:22)      RADIOLOGY & ADDITIONAL TESTS:    Imaging reports  Personally Reviewed:  [ x] YES  [ ] NO    Consultant(s) Notes Reviewed:  [x ] YES  [ ] NO    Care Discussed with Consultants/Other Providers [ x] YES  [ ] NO  Assessment and Plan:   Problem/Plan - 1:  ·  Problem: Symptomatic anemia.  Plan: IVF  monitor  labs    Problem/Plan - 2:  ·  Problem: Hematuria.  Plan: as  above     Problem/Plan - 3:  ·  Problem: Diabetes.  Plan: fs  monitor.     Problem/Plan - 4:  ·  Problem: Prostate cancer.  Plan: observation. on  casodex    Problem/Plan - 5:  ·  Problem: CKD (chronic kidney disease).  Plan: hydration.     Problem/Plan - 6:  Problem: HTN (hypertension). Plan: observation  off  meds patient  hypotensive.  discharge back  to  NH

## 2019-06-10 NOTE — PROGRESS NOTE ADULT - SUBJECTIVE AND OBJECTIVE BOX
Patient seen and examined bedside resting comfortably.  Voiding spontaenously without difficulty.  Gross hematuria      T(F): 95.8 (06-10-19 @ 12:07), Max: 96.9 (06-09-19 @ 23:45)  HR: 72 (06-10-19 @ 12:07) (72 - 85)  BP: 132/54 (06-10-19 @ 12:07) (116/66 - 133/63)  RR: 20 (06-10-19 @ 12:07) (17 - 20)  SpO2: 100% (06-10-19 @ 12:07) (100% - 100%)  Wt(kg): --  CAPILLARY BLOOD GLUCOSE      POCT Blood Glucose.: 83 mg/dL (10 Kevin 2019 15:47)  POCT Blood Glucose.: 285 mg/dL (10 Kevin 2019 11:22)  POCT Blood Glucose.: 68 mg/dL (10 Kevin 2019 09:58)  POCT Blood Glucose.: 61 mg/dL (10 Kevin 2019 08:17)  POCT Blood Glucose.: 56 mg/dL (10 Kevin 2019 08:16)  POCT Blood Glucose.: 57 mg/dL (10 Kevin 2019 07:50)  POCT Blood Glucose.: 54 mg/dL (10 Kevin 2019 07:48)  POCT Blood Glucose.: 76 mg/dL (09 Jun 2019 22:09)  POCT Blood Glucose.: 69 mg/dL (09 Jun 2019 22:05)      PHYSICAL EXAM:  General: NAD, WDWN  Neuro:  Alert & conscious  HEENT: NCAT, EOMI, conjunctiva clear  Lung: respirations nonlabored, good inspiratory effort  Abdomen: soft, NTND.   Extremities: no pedal edema or calf tenderness noted   : uncircumcised phallus, adequate meatus.     LABS:                        8.1    4.45  )-----------( 197      ( 10 Kevin 2019 07:20 )             26.1     06-10    136  |  105  |  40<H>  ----------------------------<  83  3.5   |  23  |  1.09    Ca    8.6      10 Kevin 2019 07:20    TPro  5.9<L>  /  Alb  2.5<L>  /  TBili  0.5  /  DBili  x   /  AST  11<L>  /  ALT  8<L>  /  AlkPhos  1145<H>  06-09      I&O's Detail    09 Jun 2019 07:01  -  10 Kevin 2019 07:00  --------------------------------------------------------  IN:    Oral Fluid: 300 mL  Total IN: 300 mL    OUT:    Voided: 450 mL  Total OUT: 450 mL    Total NET: -150 mL      10 Kevin 2019 07:01  -  10 Kevin 2019 17:50  --------------------------------------------------------  IN:  Total IN: 0 mL    OUT:    Voided: 400 mL  Total OUT: 400 mL    Total NET: -400 mL          wbc    06-10 @ 07:20    4.45    06-09 @ 15:19    4.31    06-09 @ 07:24    4.31    06-08 @ 14:55    6.42    06-08 @ 03:39    6.82        cr   06-10 @ 07:20   1.09    06-09 @ 07:24   0.93    06-08 @ 03:39   1.18

## 2019-06-10 NOTE — PHYSICAL THERAPY INITIAL EVALUATION ADULT - ADDITIONAL COMMENTS
As per H& P notes pt is from a nursing home, Pt seems very confused about the living environment, saying he is from house, but he fell, ended up in nursing home, unclear history, child or daughter number  is not in use. As per pt, he has a wheelchair but he ambulates in the nursing home with CGA using rolling walker. As per H& P notes pt is from a nursing home, Pt seems very confused about the living environment, saying he is from house, but he fell, ended up in nursing home, unclear history, child or daughter number  is not in use. As per pt, he has a wheelchair but he ambulates in the nursing home with CGA using rolling walker.   As per chart review pt resides in [facility](nursing home) , he requires assistance for all functional mobility including ambulation using rolling walker

## 2019-06-10 NOTE — DISCHARGE NOTE PROVIDER - NSDCCPCAREPLAN_GEN_ALL_CORE_FT
PRINCIPAL DISCHARGE DIAGNOSIS  Diagnosis: Symptomatic anemia  Assessment and Plan of Treatment:       SECONDARY DISCHARGE DIAGNOSES  Diagnosis: Gross hematuria  Assessment and Plan of Treatment:     Diagnosis: Hypotension  Assessment and Plan of Treatment:     Diagnosis: Anemia  Assessment and Plan of Treatment: PRINCIPAL DISCHARGE DIAGNOSIS  Diagnosis: Symptomatic anemia  Assessment and Plan of Treatment:       SECONDARY DISCHARGE DIAGNOSES  Diagnosis: Hydronephrosis of right kidney  Assessment and Plan of Treatment:     Diagnosis: Gross hematuria  Assessment and Plan of Treatment:     Diagnosis: Hypotension  Assessment and Plan of Treatment:     Diagnosis: Anemia  Assessment and Plan of Treatment:

## 2019-06-10 NOTE — PHYSICAL THERAPY INITIAL EVALUATION ADULT - STRENGTHENING, PT EVAL
Pt will increase strength in Both LEs, Both UEs to 4/5 to to perform ADLs, Gait with CGA in 2 to 3 weeks

## 2019-06-10 NOTE — PHYSICAL THERAPY INITIAL EVALUATION ADULT - GAIT DEVIATIONS NOTED, PT EVAL
decreased step length/decreased antonio/increased time in double stance/decreased stride length/decreased weight-shifting ability

## 2019-06-10 NOTE — PHYSICAL THERAPY INITIAL EVALUATION ADULT - CRITERIA FOR SKILLED THERAPEUTIC INTERVENTIONS
anticipated discharge recommendation/anticipated equipment needs at discharge/functional limitations in following categories/risk reduction/prevention/impairments found/therapy frequency/Subacute rehab/predicted duration of therapy intervention

## 2019-06-10 NOTE — PHYSICAL THERAPY INITIAL EVALUATION ADULT - BALANCE TRAINING, PT EVAL
Pt will improve static, dynamic standing balance to good to perform ADLs, Gait with CGA in 2 to 3 weeks

## 2019-06-10 NOTE — PROGRESS NOTE ADULT - ASSESSMENT
hematuria   Right hydronephrosis  Ca prostate on Casodex  bleeding from right kidney    renal function stable    discussed with Dr Pichardo  Palliative care

## 2019-06-10 NOTE — PHYSICAL THERAPY INITIAL EVALUATION ADULT - IMPAIRMENTS CONTRIBUTING TO GAIT DEVIATIONS, PT EVAL
ataxic/decreased strength/impaired coordination/decreased flexibility/impaired postural control/cognition/pain/impaired balance

## 2019-06-11 LAB
GLUCOSE BLDC GLUCOMTR-MCNC: 108 MG/DL — HIGH (ref 70–99)
GLUCOSE BLDC GLUCOMTR-MCNC: 207 MG/DL — HIGH (ref 70–99)
GLUCOSE BLDC GLUCOMTR-MCNC: 70 MG/DL — SIGNIFICANT CHANGE UP (ref 70–99)
HCT VFR BLD CALC: 21.8 % — LOW (ref 39–50)
HCT VFR BLD CALC: 23.8 % — LOW (ref 39–50)
HGB BLD-MCNC: 6.8 G/DL — CRITICAL LOW (ref 13–17)
HGB BLD-MCNC: 7.4 G/DL — LOW (ref 13–17)
MCHC RBC-ENTMCNC: 27.6 PG — SIGNIFICANT CHANGE UP (ref 27–34)
MCHC RBC-ENTMCNC: 31.1 GM/DL — LOW (ref 32–36)
MCV RBC AUTO: 88.8 FL — SIGNIFICANT CHANGE UP (ref 80–100)
NON-GYNECOLOGICAL CYTOLOGY STUDY: SIGNIFICANT CHANGE UP
NRBC # BLD: 0 /100 WBCS — SIGNIFICANT CHANGE UP (ref 0–0)
PLATELET # BLD AUTO: 182 K/UL — SIGNIFICANT CHANGE UP (ref 150–400)
RBC # BLD: 2.68 M/UL — LOW (ref 4.2–5.8)
RBC # FLD: 16.2 % — HIGH (ref 10.3–14.5)
WBC # BLD: 3.99 K/UL — SIGNIFICANT CHANGE UP (ref 3.8–10.5)
WBC # FLD AUTO: 3.99 K/UL — SIGNIFICANT CHANGE UP (ref 3.8–10.5)

## 2019-06-11 RX ADMIN — DORZOLAMIDE HYDROCHLORIDE TIMOLOL MALEATE 1 DROP(S): 20; 5 SOLUTION/ DROPS OPHTHALMIC at 17:32

## 2019-06-11 RX ADMIN — DORZOLAMIDE HYDROCHLORIDE TIMOLOL MALEATE 1 DROP(S): 20; 5 SOLUTION/ DROPS OPHTHALMIC at 05:53

## 2019-06-11 RX ADMIN — Medication 2: at 11:53

## 2019-06-11 RX ADMIN — MIDODRINE HYDROCHLORIDE 5 MILLIGRAM(S): 2.5 TABLET ORAL at 21:40

## 2019-06-11 RX ADMIN — PANTOPRAZOLE SODIUM 40 MILLIGRAM(S): 20 TABLET, DELAYED RELEASE ORAL at 08:11

## 2019-06-11 RX ADMIN — REPAGLINIDE 0.5 MILLIGRAM(S): 1 TABLET ORAL at 13:38

## 2019-06-11 RX ADMIN — LATANOPROST 1 DROP(S): 0.05 SOLUTION/ DROPS OPHTHALMIC; TOPICAL at 21:41

## 2019-06-11 RX ADMIN — REPAGLINIDE 0.5 MILLIGRAM(S): 1 TABLET ORAL at 21:41

## 2019-06-11 RX ADMIN — BICALUTAMIDE 50 MILLIGRAM(S): 50 TABLET, FILM COATED ORAL at 11:53

## 2019-06-11 RX ADMIN — Medication 325 MILLIGRAM(S): at 11:53

## 2019-06-11 RX ADMIN — MIDODRINE HYDROCHLORIDE 5 MILLIGRAM(S): 2.5 TABLET ORAL at 05:53

## 2019-06-11 RX ADMIN — REPAGLINIDE 0.5 MILLIGRAM(S): 1 TABLET ORAL at 05:53

## 2019-06-11 RX ADMIN — MIDODRINE HYDROCHLORIDE 5 MILLIGRAM(S): 2.5 TABLET ORAL at 13:37

## 2019-06-11 NOTE — PROGRESS NOTE ADULT - SUBJECTIVE AND OBJECTIVE BOX
Patient seen and examined at bedside in no distress.  Voiding without issue. + Hematuria.     T(F): 97.2 (06-11-19 @ 19:00), Max: 97.8 (06-11-19 @ 11:19)  HR: 67 (06-11-19 @ 19:00) (64 - 74)  BP: 131/58 (06-11-19 @ 19:00) (86/48 - 132/55)  RR: 17 (06-11-19 @ 19:00) (16 - 18)  SpO2: 98% (06-11-19 @ 19:00) (94% - 100%)      PHYSICAL EXAM:  Neuro: Awake, alert, NAD  CV: +S1S2 regular rate and rhythm  Lung: Respirations nonlabored  Abdomen: Soft, NTND  : Uncircumcised phallus, adequate meatus    LABS:                        6.8    x     )-----------( x        ( 11 Jun 2019 13:41 )             21.8     06-10    136  |  105  |  40<H>  ----------------------------<  83  3.5   |  23  |  1.09    Ca    8.6      10 Kevin 2019 07:20      A/P: 89M admitted with metastatic stage IV ca prostate with bone mets, right chronic hydronephrosis, bleeding from right kidney  - continue casodex  - monitor renal function  - transfuse PRN per medical team  - no  intervention planned  - palliative care  - discussed with Dr. Lou

## 2019-06-11 NOTE — DIETITIAN INITIAL EVALUATION ADULT. - PHYSICAL APPEARANCE
underweight/debilitated/BMI=18.1; no edema; Nutrition focused physical exam conducted; Subcutaneous fat loss: [moderate ] Orbital fat pads region, [moderate ]Buccal fat region, [moderate ]Triceps region,  [severe ]Ribs region.  Muscle wasting: [moderate ]Temples region, [severe ]Clavicle region, [severe ]Shoulder region, [unble ]Scapula region, [moderate ]Interosseous region,  [severe ]thigh region, [severe ]Calf region

## 2019-06-11 NOTE — PROGRESS NOTE ADULT - SUBJECTIVE AND OBJECTIVE BOX
INTERVAL HPI/OVERNIGHT EVENTS:  patient  not  able  to  be  discharged  to NH  yeserday      REVIEW OF SYSTEMS:  CONSTITUTIONAL: feels   no  complaints    NECK: No pain or stiffnes  RESPIRATORY: No SOB   CARDIOVASCULAR: No chest pain, palpitations, dizziness,   GASTROINTESTINAL: No abdominal pain. No nausea, vomiting,   NEUROLOGICAL: No headaches, no  blurry  vision no  dizziness  SKIN: No itching,   MUSCULOSKELETAL: No pain    MEDICATION:  bicalutamide 50 milliGRAM(s) Oral daily  dextrose 40% Gel 15 Gram(s) Oral once PRN  dextrose 5%. 1000 milliLiter(s) IV Continuous <Continuous>  dextrose 50% Injectable 12.5 Gram(s) IV Push once  dextrose 50% Injectable 25 Gram(s) IV Push once  dextrose 50% Injectable 25 Gram(s) IV Push once  dorzolamide 2%/timolol 0.5% Ophthalmic Solution 1 Drop(s) Both EYES two times a day  ferrous    sulfate 325 milliGRAM(s) Oral daily  glucagon  Injectable 1 milliGRAM(s) IntraMuscular once PRN  insulin lispro (HumaLOG) corrective regimen sliding scale   SubCutaneous three times a day before meals  latanoprost 0.005% Ophthalmic Solution 1 Drop(s) Both EYES at bedtime  midodrine 5 milliGRAM(s) Oral three times a day  pantoprazole    Tablet 40 milliGRAM(s) Oral before breakfast  repaglinide 0.5 milliGRAM(s) Oral three times a day  sodium chloride 0.9%. 1000 milliLiter(s) IV Continuous <Continuous>    Vital Signs Last 24 Hrs  T(C): 36.1 (11 Jun 2019 05:46), Max: 36.1 (10 Kevin 2019 17:59)  T(F): 97 (11 Jun 2019 05:46), Max: 97 (10 Kevin 2019 17:59)  HR: 64 (11 Jun 2019 06:57) (64 - 83)  BP: 114/60 (11 Jun 2019 06:57) (86/48 - 138/81)  BP(mean): --  RR: 18 (11 Jun 2019 05:46) (17 - 20)  SpO2: 100% (11 Jun 2019 05:46) (99% - 100%)    PHYSICAL EXAM:  GENERAL: NAD, well-groomed, well-developed  EYES:  conjunctiva and sclera clear  ENMT:  Moist mucous membranes,   NECK: Supple, No JVD, Normal thyroid  NERVOUS SYSTEM:  Alert oriented x2  no  focal  deficits;   CHEST/LUNG: Clear    HEART: Regular rate and rhythm; No murmurs, rubs, or gallops  ABDOMEN: Soft, Nontender, Nondistended; Bowel sounds present  EXTREMITIES:  no  edema no  tenderness  SKIN: No rashes   LABS:                        8.1    4.45  )-----------( 197      ( 10 Kevin 2019 07:20 )             26.1     06-10    136  |  105  |  40<H>  ----------------------------<  83  3.5   |  23  |  1.09    Ca    8.6      10 Kevin 2019 07:20          CAPILLARY BLOOD GLUCOSE      POCT Blood Glucose.: 70 mg/dL (11 Jun 2019 07:47)  POCT Blood Glucose.: 128 mg/dL (10 Kevin 2019 21:36)  POCT Blood Glucose.: 83 mg/dL (10 Kevin 2019 15:47)  POCT Blood Glucose.: 285 mg/dL (10 Kevin 2019 11:22)  POCT Blood Glucose.: 68 mg/dL (10 Kevin 2019 09:58)      RADIOLOGY & ADDITIONAL TESTS:    Imaging reports  Personally Reviewed:  [x ] YES  [ ] NO    Consultant(s) Notes Reviewed:  [x ] YES  [ ] NO    Care Discussed with Consultants/Other Providers [ x] YES  [ ] NO  Assessment and Plan:   Problem/Plan - 1:  ·  Problem: Symptomatic anemia.  Plan: IVF  monitor  labs    Problem/Plan - 2:  ·  Problem: Hematuria.  Plan: as  above     Problem/Plan - 3:  ·  Problem: Diabetes.  Plan: fs  monitor.     Problem/Plan - 4:  ·  Problem: Prostate cancer.  Plan: observation. on  casodex    Problem/Plan - 5:  ·  Problem: CKD (chronic kidney disease).  Plan: hydration.     Problem/Plan - 6:  Problem: HTN (hypertension). Plan: observation  off  meds patient  hypotensive.  discharge back  to  NH

## 2019-06-11 NOTE — CHART NOTE - NSCHARTNOTEFT_GEN_A_CORE
Upon Nutritional Assessment by the Registered Dietitian your patient was determined to meet criteria / has evidence of the following diagnosis/diagnoses:          [ ]  Mild Protein Calorie Malnutrition        [ ]  Moderate Protein Calorie Malnutrition        [x] Severe Protein Calorie Malnutrition        [ ] Unspecified Protein Calorie Malnutrition        [x ] Underweight / BMI <19        [ ] Morbid Obesity / BMI > 40      Findings as based on:  •  Comprehensive nutrition assessment and consultation  •  Calorie counts (nutrient intake analysis)  •  Food acceptance and intake status from observations by staff  •  Follow up  •  Patient education  •  Intervention secondary to interdisciplinary rounds  •   concerns      Treatment:    The following diet has been recommended:  Low Na/CCHO with snack/Glucerna shake 2 x day(440kcal & 20gm protein)    PROVIDER Section:     By signing this assessment you are acknowledging and agree with the diagnosis/diagnoses assigned by the Registered Dietitian    Comments:

## 2019-06-11 NOTE — DIETITIAN INITIAL EVALUATION ADULT. - ETIOLOGY
Inadequate energy & protein intake related to hx prostate cancer & CKD Inadequate energy & protein intake related to hx metastatic prostate cancer & CKD

## 2019-06-11 NOTE — DIETITIAN INITIAL EVALUATION ADULT. - OTHER INFO
Pt seen for BMI<18 & DphQ6B=4.5%(6/9). Pt resides in [facility] & states  "I didn't like the food & I would only eat breakfast for ~6 months". Pt presents with sign wt loss 20% x 10 months & presents with gross hematuria on admission. Noted po intake & appetite improved since 6/10 with hypoglycemia in the am; consumed ~75% meals 6/10. Pt with natural dentition reports no difficulty chewing or swallowing. Last BM x 1(6/9) Pt seen for BMI<19 & IcpY3W=6.5%(6/9). Pt resides in [facility] & states  "I didn't like the food & I would only eat breakfast for ~6 months". Pt presents with sign wt loss 20% x 10 months & presents with gross hematuria on admission. Noted po intake & appetite improved since 6/10 with hypoglycemia in the am; consumed ~75% meals 6/10. Pt with natural dentition reports no difficulty chewing or swallowing. Last BM x 1(6/9) Pt seen for BMI<19 & WumQ9T=3.5%(6/9). Pt resides in [facility] & states  "I didn't like the food & I would only eat breakfast for ~6 months". Pt presents with sign wt loss 20% x 10 months & presents with gross hematuria on admission. Noted po intake & appetite improved since 6/10 with hypoglycemia in the am; consumed ~75% meals 6/10. Pt with natural dentition reports no difficulty chewing or swallowing. Last BM x 1(6/9). UBW taken from previous hospitalization in EMR.

## 2019-06-11 NOTE — DIETITIAN INITIAL EVALUATION ADULT. - PERTINENT MEDS FT
bicalutamide  dextrose 40% Gel PRN  dextrose 5%.  dextrose 50% Injectable  dextrose 50% Injectable  dextrose 50% Injectable  dorzolamide 2%/timolol 0.5% Ophthalmic Solution  ferrous    sulfate  glucagon  Injectable PRN  insulin lispro (HumaLOG) corrective regimen sliding scale  latanoprost 0.005% Ophthalmic Solution  midodrine  pantoprazole    Tablet  repaglinide  sodium chloride 0.9%.

## 2019-06-11 NOTE — DIETITIAN INITIAL EVALUATION ADULT. - PERTINENT LABORATORY DATA
06-10 Na 136 mmol/L Glu 83 mg/dL K+ 3.5 mmol/L Cr 1.09 mg/dL BUN 40 mg/dL<H> Phos n/a   Alb 2.5(6/9)  PAB n/a   Hgb 8.1 g/dL<L> Hct 26.1 %<L> HgA1C n/a     24Hr FS:70 mg/dL, POCT=70,128,83

## 2019-06-12 LAB
GLUCOSE BLDC GLUCOMTR-MCNC: 186 MG/DL — HIGH (ref 70–99)
GLUCOSE BLDC GLUCOMTR-MCNC: 206 MG/DL — HIGH (ref 70–99)
GLUCOSE BLDC GLUCOMTR-MCNC: 97 MG/DL — SIGNIFICANT CHANGE UP (ref 70–99)
HCT VFR BLD CALC: 32.9 % — LOW (ref 39–50)
HGB BLD-MCNC: 10.5 G/DL — LOW (ref 13–17)
MCHC RBC-ENTMCNC: 27.3 PG — SIGNIFICANT CHANGE UP (ref 27–34)
MCHC RBC-ENTMCNC: 31.9 GM/DL — LOW (ref 32–36)
MCV RBC AUTO: 85.5 FL — SIGNIFICANT CHANGE UP (ref 80–100)
NRBC # BLD: 0 /100 WBCS — SIGNIFICANT CHANGE UP (ref 0–0)
PLATELET # BLD AUTO: 194 K/UL — SIGNIFICANT CHANGE UP (ref 150–400)
RBC # BLD: 3.85 M/UL — LOW (ref 4.2–5.8)
RBC # FLD: 16 % — HIGH (ref 10.3–14.5)
WBC # BLD: 6.13 K/UL — SIGNIFICANT CHANGE UP (ref 3.8–10.5)
WBC # FLD AUTO: 6.13 K/UL — SIGNIFICANT CHANGE UP (ref 3.8–10.5)

## 2019-06-12 RX ADMIN — DORZOLAMIDE HYDROCHLORIDE TIMOLOL MALEATE 1 DROP(S): 20; 5 SOLUTION/ DROPS OPHTHALMIC at 17:26

## 2019-06-12 RX ADMIN — Medication 1: at 17:26

## 2019-06-12 RX ADMIN — BICALUTAMIDE 50 MILLIGRAM(S): 50 TABLET, FILM COATED ORAL at 11:04

## 2019-06-12 RX ADMIN — LATANOPROST 1 DROP(S): 0.05 SOLUTION/ DROPS OPHTHALMIC; TOPICAL at 21:43

## 2019-06-12 RX ADMIN — DORZOLAMIDE HYDROCHLORIDE TIMOLOL MALEATE 1 DROP(S): 20; 5 SOLUTION/ DROPS OPHTHALMIC at 05:43

## 2019-06-12 RX ADMIN — MIDODRINE HYDROCHLORIDE 5 MILLIGRAM(S): 2.5 TABLET ORAL at 05:42

## 2019-06-12 RX ADMIN — REPAGLINIDE 0.5 MILLIGRAM(S): 1 TABLET ORAL at 21:43

## 2019-06-12 RX ADMIN — REPAGLINIDE 0.5 MILLIGRAM(S): 1 TABLET ORAL at 13:30

## 2019-06-12 RX ADMIN — REPAGLINIDE 0.5 MILLIGRAM(S): 1 TABLET ORAL at 05:42

## 2019-06-12 RX ADMIN — PANTOPRAZOLE SODIUM 40 MILLIGRAM(S): 20 TABLET, DELAYED RELEASE ORAL at 08:08

## 2019-06-12 RX ADMIN — MIDODRINE HYDROCHLORIDE 5 MILLIGRAM(S): 2.5 TABLET ORAL at 21:43

## 2019-06-12 RX ADMIN — Medication 325 MILLIGRAM(S): at 11:04

## 2019-06-12 RX ADMIN — Medication 2: at 11:04

## 2019-06-12 NOTE — PROGRESS NOTE ADULT - SUBJECTIVE AND OBJECTIVE BOX
INTERVAL HPI/OVERNIGHT EVENTS:    requured  transfusion of  PRBCs  for  Hb <7.0  yesterday    REVIEW OF SYSTEMS:  CONSTITUTIONAL: feels  well no  complaints    NECK: No pain or stiffnes  RESPIRATORY: No SOB   CARDIOVASCULAR: No chest pain, palpitations, dizziness,   GASTROINTESTINAL: No abdominal pain. No nausea, vomiting,   NEUROLOGICAL: No headaches, no  blurry  vision no  dizziness  SKIN: No itching,   MUSCULOSKELETAL: No pain    MEDICATION:  bicalutamide 50 milliGRAM(s) Oral daily  dextrose 40% Gel 15 Gram(s) Oral once PRN  dextrose 5%. 1000 milliLiter(s) IV Continuous <Continuous>  dextrose 50% Injectable 12.5 Gram(s) IV Push once  dextrose 50% Injectable 25 Gram(s) IV Push once  dextrose 50% Injectable 25 Gram(s) IV Push once  dorzolamide 2%/timolol 0.5% Ophthalmic Solution 1 Drop(s) Both EYES two times a day  ferrous    sulfate 325 milliGRAM(s) Oral daily  glucagon  Injectable 1 milliGRAM(s) IntraMuscular once PRN  insulin lispro (HumaLOG) corrective regimen sliding scale   SubCutaneous three times a day before meals  latanoprost 0.005% Ophthalmic Solution 1 Drop(s) Both EYES at bedtime  midodrine 5 milliGRAM(s) Oral three times a day  pantoprazole    Tablet 40 milliGRAM(s) Oral before breakfast  repaglinide 0.5 milliGRAM(s) Oral three times a day  sodium chloride 0.9%. 1000 milliLiter(s) IV Continuous <Continuous>    Vital Signs Last 24 Hrs  T(C): 36.1 (12 Jun 2019 05:36), Max: 36.6 (11 Jun 2019 11:19)  T(F): 97 (12 Jun 2019 05:36), Max: 97.8 (11 Jun 2019 11:19)  HR: 66 (12 Jun 2019 05:36) (62 - 73)  BP: 115/52 (12 Jun 2019 05:36) (89/45 - 132/66)  BP(mean): --  RR: 17 (12 Jun 2019 05:36) (16 - 19)  SpO2: 100% (12 Jun 2019 05:36) (94% - 100%)    PHYSICAL EXAM:  GENERAL: NAD, well-groomed, well-developed  EYES:  conjunctiva and sclera clear  ENMT:  Moist mucous membranes,   NECK: Supple, No JVD, Normal thyroid  NERVOUS SYSTEM:  Alert oriented x2  no  focal  deficits;   CHEST/LUNG: Clear    HEART: Regular rate and rhythm; No murmurs, rubs, or gallops  ABDOMEN: Soft, Nontender, Nondistended; Bowel sounds present  EXTREMITIES:  no  edema no  tenderness  SKIN: No rashes   LABS:                        6.8    x     )-----------( x        ( 11 Jun 2019 13:41 )             21.8               CAPILLARY BLOOD GLUCOSE      POCT Blood Glucose.: 97 mg/dL (12 Jun 2019 07:46)  POCT Blood Glucose.: 108 mg/dL (11 Jun 2019 16:49)  POCT Blood Glucose.: 207 mg/dL (11 Jun 2019 10:58)      RADIOLOGY & ADDITIONAL TESTS:    Imaging reports  Personally Reviewed:  [ x] YES  [ ] NO    Consultant(s) Notes Reviewed:  [ x] YES  [ ] NO    Care Discussed with Consultants/Other Providers [x ] YES  [ ] NO  Assessment and Plan:   Problem/Plan - 1:  ·  Problem: Symptomatic anemia.  Plan: IVF  monitor  labs discussed  with  urology  would need  cystoscopy  ureteroscopy  for  further  evalaution  of    bleed  and  rt  hydronephrosis  have  left  message  with pt's  daughter  Ms Lee  for  discussion  on  further w/u and  treatment    Problem/Plan - 2:  ·  Problem: Hematuria.  Plan: as  above     Problem/Plan - 3:  ·  Problem: Diabetes.  Plan: fs  monitor.     Problem/Plan - 4:  ·  Problem: Prostate cancer.  Plan: observation. on  casodex    Problem/Plan - 5:  ·  Problem: CKD (chronic kidney disease).  Plan: hydration.     Problem/Plan - 6:  Problem: HTN (hypertension). Plan: observation  off  meds patient  hypotensive.

## 2019-06-13 ENCOUNTER — TRANSCRIPTION ENCOUNTER (OUTPATIENT)
Age: 84
End: 2019-06-13

## 2019-06-13 LAB
ANION GAP SERPL CALC-SCNC: 8 MMOL/L — SIGNIFICANT CHANGE UP (ref 5–17)
BUN SERPL-MCNC: 31 MG/DL — HIGH (ref 7–23)
CALCIUM SERPL-MCNC: 8.4 MG/DL — LOW (ref 8.5–10.1)
CHLORIDE SERPL-SCNC: 106 MMOL/L — SIGNIFICANT CHANGE UP (ref 96–108)
CO2 SERPL-SCNC: 22 MMOL/L — SIGNIFICANT CHANGE UP (ref 22–31)
CREAT SERPL-MCNC: 0.96 MG/DL — SIGNIFICANT CHANGE UP (ref 0.5–1.3)
GLUCOSE BLDC GLUCOMTR-MCNC: 104 MG/DL — HIGH (ref 70–99)
GLUCOSE BLDC GLUCOMTR-MCNC: 118 MG/DL — HIGH (ref 70–99)
GLUCOSE BLDC GLUCOMTR-MCNC: 202 MG/DL — HIGH (ref 70–99)
GLUCOSE BLDC GLUCOMTR-MCNC: 221 MG/DL — HIGH (ref 70–99)
GLUCOSE SERPL-MCNC: 261 MG/DL — HIGH (ref 70–99)
HCT VFR BLD CALC: 31.2 % — LOW (ref 39–50)
HGB BLD-MCNC: 10 G/DL — LOW (ref 13–17)
MCHC RBC-ENTMCNC: 27.5 PG — SIGNIFICANT CHANGE UP (ref 27–34)
MCHC RBC-ENTMCNC: 32.1 GM/DL — SIGNIFICANT CHANGE UP (ref 32–36)
MCV RBC AUTO: 85.7 FL — SIGNIFICANT CHANGE UP (ref 80–100)
NRBC # BLD: 0 /100 WBCS — SIGNIFICANT CHANGE UP (ref 0–0)
PLATELET # BLD AUTO: 173 K/UL — SIGNIFICANT CHANGE UP (ref 150–400)
POTASSIUM SERPL-MCNC: 4 MMOL/L — SIGNIFICANT CHANGE UP (ref 3.5–5.3)
POTASSIUM SERPL-SCNC: 4 MMOL/L — SIGNIFICANT CHANGE UP (ref 3.5–5.3)
RBC # BLD: 3.64 M/UL — LOW (ref 4.2–5.8)
RBC # FLD: 16.2 % — HIGH (ref 10.3–14.5)
SODIUM SERPL-SCNC: 136 MMOL/L — SIGNIFICANT CHANGE UP (ref 135–145)
WBC # BLD: 6.88 K/UL — SIGNIFICANT CHANGE UP (ref 3.8–10.5)
WBC # FLD AUTO: 6.88 K/UL — SIGNIFICANT CHANGE UP (ref 3.8–10.5)

## 2019-06-13 RX ADMIN — MIDODRINE HYDROCHLORIDE 5 MILLIGRAM(S): 2.5 TABLET ORAL at 14:24

## 2019-06-13 RX ADMIN — LATANOPROST 1 DROP(S): 0.05 SOLUTION/ DROPS OPHTHALMIC; TOPICAL at 21:40

## 2019-06-13 RX ADMIN — Medication 2: at 11:47

## 2019-06-13 RX ADMIN — Medication 2: at 08:14

## 2019-06-13 RX ADMIN — REPAGLINIDE 0.5 MILLIGRAM(S): 1 TABLET ORAL at 21:40

## 2019-06-13 RX ADMIN — PANTOPRAZOLE SODIUM 40 MILLIGRAM(S): 20 TABLET, DELAYED RELEASE ORAL at 08:14

## 2019-06-13 RX ADMIN — Medication 325 MILLIGRAM(S): at 11:49

## 2019-06-13 RX ADMIN — REPAGLINIDE 0.5 MILLIGRAM(S): 1 TABLET ORAL at 05:22

## 2019-06-13 RX ADMIN — DORZOLAMIDE HYDROCHLORIDE TIMOLOL MALEATE 1 DROP(S): 20; 5 SOLUTION/ DROPS OPHTHALMIC at 05:22

## 2019-06-13 RX ADMIN — MIDODRINE HYDROCHLORIDE 5 MILLIGRAM(S): 2.5 TABLET ORAL at 21:39

## 2019-06-13 RX ADMIN — BICALUTAMIDE 50 MILLIGRAM(S): 50 TABLET, FILM COATED ORAL at 11:49

## 2019-06-13 RX ADMIN — REPAGLINIDE 0.5 MILLIGRAM(S): 1 TABLET ORAL at 14:24

## 2019-06-13 RX ADMIN — DORZOLAMIDE HYDROCHLORIDE TIMOLOL MALEATE 1 DROP(S): 20; 5 SOLUTION/ DROPS OPHTHALMIC at 17:43

## 2019-06-13 NOTE — PROGRESS NOTE ADULT - SUBJECTIVE AND OBJECTIVE BOX
Patient seen and examined bedside resting comfortably.  No complaints offered.   Denies nausea and vomiting. Tolerating diet.  Flatus/BM. +  Denies chest pain, dyspnea, cough.    T(F): 96.1 (06-13-19 @ 05:22), Max: 97.8 (06-12-19 @ 11:13)  HR: 61 (06-13-19 @ 05:22) (59 - 66)  BP: 135/56 (06-13-19 @ 05:22) (110/58 - 135/56)  RR: 18 (06-13-19 @ 05:22) (16 - 18)  SpO2: 100% (06-13-19 @ 05:22) (100% - 100%)    CAPILLARY BLOOD GLUCOSE  POCT Blood Glucose.: 221 mg/dL (13 Jun 2019 07:44)  POCT Blood Glucose.: 186 mg/dL (12 Jun 2019 17:24)  POCT Blood Glucose.: 206 mg/dL (12 Jun 2019 10:58)      PHYSICAL EXAM:  General: NAD  Neuro:  Alert. confused  Abdomen: soft, NTND. Normactive BS  : Voiding spontaneously. No blood sen in urinal. Uncircumcised. Adequate meatus.    LABS:                        10.0   6.88  )-----------( 173      ( 13 Jun 2019 06:20 )             31.2     06-13    136  |  106  |  31<H>  ----------------------------<  261<H>  4.0   |  22  |  0.96    Ca    8.4<L>      13 Jun 2019 06:20        I&O's Detail  none documented    Impression:  hematuria - resolved?  metastatic prostate CA  anemia - s/p tranfsion  CKD  DM  HTN - off meds because of hypotension  DNR      Plan:  - continue medical f/u  -f/u AM labs  -Dr. Pichardo was able to contact the pt's daughter. That # was relayed to Dr. Lou by Dr. Pichardo. Await that discussion to decide about possible surgical intervention.

## 2019-06-13 NOTE — PROGRESS NOTE ADULT - SUBJECTIVE AND OBJECTIVE BOX
INTERVAL HPI/OVERNIGHT EVENTS:    continues  with  hematuria    REVIEW OF SYSTEMS:  CONSTITUTIONAL:  no  complaints    NECK: No pain or stiffnes  RESPIRATORY: No SOB   CARDIOVASCULAR: No chest pain, palpitations, dizziness,   GASTROINTESTINAL: No abdominal pain. No nausea, vomiting,   NEUROLOGICAL: No headaches, no  blurry  vision no  dizziness  SKIN: No itching,   MUSCULOSKELETAL: No pain    MEDICATION:  bicalutamide 50 milliGRAM(s) Oral daily  dextrose 40% Gel 15 Gram(s) Oral once PRN  dextrose 5%. 1000 milliLiter(s) IV Continuous <Continuous>  dextrose 50% Injectable 12.5 Gram(s) IV Push once  dextrose 50% Injectable 25 Gram(s) IV Push once  dextrose 50% Injectable 25 Gram(s) IV Push once  dorzolamide 2%/timolol 0.5% Ophthalmic Solution 1 Drop(s) Both EYES two times a day  ferrous    sulfate 325 milliGRAM(s) Oral daily  glucagon  Injectable 1 milliGRAM(s) IntraMuscular once PRN  insulin lispro (HumaLOG) corrective regimen sliding scale   SubCutaneous three times a day before meals  latanoprost 0.005% Ophthalmic Solution 1 Drop(s) Both EYES at bedtime  midodrine 5 milliGRAM(s) Oral three times a day  pantoprazole    Tablet 40 milliGRAM(s) Oral before breakfast  repaglinide 0.5 milliGRAM(s) Oral three times a day  sodium chloride 0.9%. 1000 milliLiter(s) IV Continuous <Continuous>    Vital Signs Last 24 Hrs  T(C): 35.6 (13 Jun 2019 05:22), Max: 36.6 (12 Jun 2019 11:13)  T(F): 96.1 (13 Jun 2019 05:22), Max: 97.8 (12 Jun 2019 11:13)  HR: 61 (13 Jun 2019 05:22) (59 - 66)  BP: 135/56 (13 Jun 2019 05:22) (110/58 - 135/56)  BP(mean): --  RR: 18 (13 Jun 2019 05:22) (16 - 18)  SpO2: 100% (13 Jun 2019 05:22) (100% - 100%)    PHYSICAL EXAM:  GENERAL: NAD, well-groomed, well-developed  EYES:  conjunctiva and sclera clear  ENMT:  Moist mucous membranes,   NECK: Supple, No JVD, Normal thyroid  NERVOUS SYSTEM:  Alert oriented x2  no  focal  deficits;   CHEST/LUNG: Clear    HEART: Regular rate and rhythm; No murmurs, rubs, or gallops  ABDOMEN: Soft, Nontender, Nondistended; Bowel sounds present  EXTREMITIES:  no  edema no  tenderness  SKIN: No rashes   LABS:                        10.0   6.88  )-----------( 173      ( 13 Jun 2019 06:20 )             31.2     06-13    136  |  106  |  31<H>  ----------------------------<  261<H>  4.0   |  22  |  0.96    Ca    8.4<L>      13 Jun 2019 06:20          CAPILLARY BLOOD GLUCOSE      POCT Blood Glucose.: 221 mg/dL (13 Jun 2019 07:44)  POCT Blood Glucose.: 186 mg/dL (12 Jun 2019 17:24)  POCT Blood Glucose.: 206 mg/dL (12 Jun 2019 10:58)      RADIOLOGY & ADDITIONAL TESTS:    Imaging reports  Personally Reviewed:  [x ] YES  [ ] NO    Consultant(s) Notes Reviewed:  [x ] YES  [ ] NO    Care Discussed with Consultants/Other Providers [x ] YES  [ ] NO  Assessment and Plan:   Problem/Plan - 1:  ·  Problem: Symptomatic anemia.  Plan: IVF  monitor  labs discussed  with  urology  would need  cystoscopy  ureteroscopy  for  further  evalaution  of    bleed  and  rt  hydronephrosis   discussion  with  with  Pt's  daughter  Ms Lee  after  rectification  of  telefone # [phone] agrees   procedures  wants  to  speak to Urology  have  sent  # to Dr Lou    Problem/Plan - 2:  ·  Problem: Hematuria.  Plan: as  above     Problem/Plan - 3:  ·  Problem: Diabetes.  Plan: fs  monitor.     Problem/Plan - 4:  ·  Problem: Prostate cancer.  Plan: observation. on  casodex    Problem/Plan - 5:  ·  Problem: CKD (chronic kidney disease).  Plan: hydration.     Problem/Plan - 6:  Problem: HTN (hypertension). Plan: observation  off  meds patient  hypotensive.

## 2019-06-14 ENCOUNTER — RESULT REVIEW (OUTPATIENT)
Age: 84
End: 2019-06-14

## 2019-06-14 LAB
ALBUMIN SERPL ELPH-MCNC: 2.8 G/DL — LOW (ref 3.3–5)
ALP SERPL-CCNC: 1184 U/L — HIGH (ref 40–120)
ALT FLD-CCNC: 14 U/L — SIGNIFICANT CHANGE UP (ref 12–78)
ANION GAP SERPL CALC-SCNC: 7 MMOL/L — SIGNIFICANT CHANGE UP (ref 5–17)
ANION GAP SERPL CALC-SCNC: 8 MMOL/L — SIGNIFICANT CHANGE UP (ref 5–17)
APTT BLD: 30.6 SEC — SIGNIFICANT CHANGE UP (ref 28.5–37)
AST SERPL-CCNC: 16 U/L — SIGNIFICANT CHANGE UP (ref 15–37)
BASOPHILS # BLD AUTO: 0.01 K/UL — SIGNIFICANT CHANGE UP (ref 0–0.2)
BASOPHILS NFR BLD AUTO: 0.2 % — SIGNIFICANT CHANGE UP (ref 0–2)
BILIRUB SERPL-MCNC: 0.3 MG/DL — SIGNIFICANT CHANGE UP (ref 0.2–1.2)
BLD GP AB SCN SERPL QL: SIGNIFICANT CHANGE UP
BUN SERPL-MCNC: 28 MG/DL — HIGH (ref 7–23)
BUN SERPL-MCNC: 32 MG/DL — HIGH (ref 7–23)
CALCIUM SERPL-MCNC: 8.1 MG/DL — LOW (ref 8.5–10.1)
CALCIUM SERPL-MCNC: 8.3 MG/DL — LOW (ref 8.5–10.1)
CHLORIDE SERPL-SCNC: 110 MMOL/L — HIGH (ref 96–108)
CHLORIDE SERPL-SCNC: 111 MMOL/L — HIGH (ref 96–108)
CO2 SERPL-SCNC: 23 MMOL/L — SIGNIFICANT CHANGE UP (ref 22–31)
CO2 SERPL-SCNC: 25 MMOL/L — SIGNIFICANT CHANGE UP (ref 22–31)
CREAT SERPL-MCNC: 1.01 MG/DL — SIGNIFICANT CHANGE UP (ref 0.5–1.3)
CREAT SERPL-MCNC: 1.02 MG/DL — SIGNIFICANT CHANGE UP (ref 0.5–1.3)
EOSINOPHIL # BLD AUTO: 0.05 K/UL — SIGNIFICANT CHANGE UP (ref 0–0.5)
EOSINOPHIL NFR BLD AUTO: 0.8 % — SIGNIFICANT CHANGE UP (ref 0–6)
GLUCOSE BLDC GLUCOMTR-MCNC: 109 MG/DL — HIGH (ref 70–99)
GLUCOSE BLDC GLUCOMTR-MCNC: 275 MG/DL — HIGH (ref 70–99)
GLUCOSE BLDC GLUCOMTR-MCNC: 79 MG/DL — SIGNIFICANT CHANGE UP (ref 70–99)
GLUCOSE BLDC GLUCOMTR-MCNC: 94 MG/DL — SIGNIFICANT CHANGE UP (ref 70–99)
GLUCOSE SERPL-MCNC: 223 MG/DL — HIGH (ref 70–99)
GLUCOSE SERPL-MCNC: 85 MG/DL — SIGNIFICANT CHANGE UP (ref 70–99)
HCT VFR BLD CALC: 29 % — LOW (ref 39–50)
HCT VFR BLD CALC: 33 % — LOW (ref 39–50)
HGB BLD-MCNC: 10.4 G/DL — LOW (ref 13–17)
HGB BLD-MCNC: 9.2 G/DL — LOW (ref 13–17)
IMM GRANULOCYTES NFR BLD AUTO: 4.1 % — HIGH (ref 0–1.5)
INR BLD: 1.08 RATIO — SIGNIFICANT CHANGE UP (ref 0.88–1.16)
LACTATE SERPL-SCNC: 0.8 MMOL/L — SIGNIFICANT CHANGE UP (ref 0.7–2)
LACTATE SERPL-SCNC: 1 MMOL/L — SIGNIFICANT CHANGE UP (ref 0.7–2)
LYMPHOCYTES # BLD AUTO: 1.25 K/UL — SIGNIFICANT CHANGE UP (ref 1–3.3)
LYMPHOCYTES # BLD AUTO: 18.9 % — SIGNIFICANT CHANGE UP (ref 13–44)
MCHC RBC-ENTMCNC: 27.5 PG — SIGNIFICANT CHANGE UP (ref 27–34)
MCHC RBC-ENTMCNC: 27.6 PG — SIGNIFICANT CHANGE UP (ref 27–34)
MCHC RBC-ENTMCNC: 31.5 GM/DL — LOW (ref 32–36)
MCHC RBC-ENTMCNC: 31.7 GM/DL — LOW (ref 32–36)
MCV RBC AUTO: 86.8 FL — SIGNIFICANT CHANGE UP (ref 80–100)
MCV RBC AUTO: 87.5 FL — SIGNIFICANT CHANGE UP (ref 80–100)
MONOCYTES # BLD AUTO: 0.56 K/UL — SIGNIFICANT CHANGE UP (ref 0–0.9)
MONOCYTES NFR BLD AUTO: 8.5 % — SIGNIFICANT CHANGE UP (ref 2–14)
NEUTROPHILS # BLD AUTO: 4.47 K/UL — SIGNIFICANT CHANGE UP (ref 1.8–7.4)
NEUTROPHILS NFR BLD AUTO: 67.5 % — SIGNIFICANT CHANGE UP (ref 43–77)
NRBC # BLD: 0 /100 WBCS — SIGNIFICANT CHANGE UP (ref 0–0)
NRBC # BLD: 0 /100 WBCS — SIGNIFICANT CHANGE UP (ref 0–0)
PLATELET # BLD AUTO: 186 K/UL — SIGNIFICANT CHANGE UP (ref 150–400)
PLATELET # BLD AUTO: 200 K/UL — SIGNIFICANT CHANGE UP (ref 150–400)
POTASSIUM SERPL-MCNC: 4.4 MMOL/L — SIGNIFICANT CHANGE UP (ref 3.5–5.3)
POTASSIUM SERPL-MCNC: 4.4 MMOL/L — SIGNIFICANT CHANGE UP (ref 3.5–5.3)
POTASSIUM SERPL-SCNC: 4.4 MMOL/L — SIGNIFICANT CHANGE UP (ref 3.5–5.3)
POTASSIUM SERPL-SCNC: 4.4 MMOL/L — SIGNIFICANT CHANGE UP (ref 3.5–5.3)
PROT SERPL-MCNC: 6.2 GM/DL — SIGNIFICANT CHANGE UP (ref 6–8.3)
PROTHROM AB SERPL-ACNC: 12.1 SEC — SIGNIFICANT CHANGE UP (ref 10–12.9)
RBC # BLD: 3.34 M/UL — LOW (ref 4.2–5.8)
RBC # BLD: 3.77 M/UL — LOW (ref 4.2–5.8)
RBC # FLD: 16 % — HIGH (ref 10.3–14.5)
RBC # FLD: 16 % — HIGH (ref 10.3–14.5)
SODIUM SERPL-SCNC: 142 MMOL/L — SIGNIFICANT CHANGE UP (ref 135–145)
SODIUM SERPL-SCNC: 142 MMOL/L — SIGNIFICANT CHANGE UP (ref 135–145)
WBC # BLD: 6.61 K/UL — SIGNIFICANT CHANGE UP (ref 3.8–10.5)
WBC # BLD: 7.43 K/UL — SIGNIFICANT CHANGE UP (ref 3.8–10.5)
WBC # FLD AUTO: 6.61 K/UL — SIGNIFICANT CHANGE UP (ref 3.8–10.5)
WBC # FLD AUTO: 7.43 K/UL — SIGNIFICANT CHANGE UP (ref 3.8–10.5)

## 2019-06-14 PROCEDURE — 88305 TISSUE EXAM BY PATHOLOGIST: CPT | Mod: 26

## 2019-06-14 PROCEDURE — 71045 X-RAY EXAM CHEST 1 VIEW: CPT | Mod: 26

## 2019-06-14 PROCEDURE — 93010 ELECTROCARDIOGRAM REPORT: CPT

## 2019-06-14 RX ORDER — DEXTROSE 50 % IN WATER 50 %
12.5 SYRINGE (ML) INTRAVENOUS ONCE
Refills: 0 | Status: DISCONTINUED | OUTPATIENT
Start: 2019-06-14 | End: 2019-06-18

## 2019-06-14 RX ORDER — ACETAMINOPHEN 500 MG
650 TABLET ORAL ONCE
Refills: 0 | Status: COMPLETED | OUTPATIENT
Start: 2019-06-14 | End: 2019-06-14

## 2019-06-14 RX ORDER — FERROUS SULFATE 325(65) MG
325 TABLET ORAL DAILY
Refills: 0 | Status: DISCONTINUED | OUTPATIENT
Start: 2019-06-14 | End: 2019-06-18

## 2019-06-14 RX ORDER — PANTOPRAZOLE SODIUM 20 MG/1
40 TABLET, DELAYED RELEASE ORAL
Refills: 0 | Status: DISCONTINUED | OUTPATIENT
Start: 2019-06-14 | End: 2019-06-18

## 2019-06-14 RX ORDER — FUROSEMIDE 40 MG
10 TABLET ORAL ONCE
Refills: 0 | Status: COMPLETED | OUTPATIENT
Start: 2019-06-14 | End: 2019-06-14

## 2019-06-14 RX ORDER — GLUCAGON INJECTION, SOLUTION 0.5 MG/.1ML
1 INJECTION, SOLUTION SUBCUTANEOUS ONCE
Refills: 0 | Status: DISCONTINUED | OUTPATIENT
Start: 2019-06-14 | End: 2019-06-18

## 2019-06-14 RX ORDER — BICALUTAMIDE 50 MG/1
50 TABLET, FILM COATED ORAL DAILY
Refills: 0 | Status: DISCONTINUED | OUTPATIENT
Start: 2019-06-14 | End: 2019-06-18

## 2019-06-14 RX ORDER — DEXTROSE 50 % IN WATER 50 %
25 SYRINGE (ML) INTRAVENOUS ONCE
Refills: 0 | Status: DISCONTINUED | OUTPATIENT
Start: 2019-06-14 | End: 2019-06-18

## 2019-06-14 RX ORDER — INSULIN LISPRO 100/ML
VIAL (ML) SUBCUTANEOUS
Refills: 0 | Status: DISCONTINUED | OUTPATIENT
Start: 2019-06-14 | End: 2019-06-18

## 2019-06-14 RX ORDER — SODIUM CHLORIDE 9 MG/ML
1000 INJECTION INTRAMUSCULAR; INTRAVENOUS; SUBCUTANEOUS
Refills: 0 | Status: DISCONTINUED | OUTPATIENT
Start: 2019-06-14 | End: 2019-06-14

## 2019-06-14 RX ORDER — LATANOPROST 0.05 MG/ML
1 SOLUTION/ DROPS OPHTHALMIC; TOPICAL AT BEDTIME
Refills: 0 | Status: DISCONTINUED | OUTPATIENT
Start: 2019-06-14 | End: 2019-06-18

## 2019-06-14 RX ORDER — REPAGLINIDE 1 MG/1
0.5 TABLET ORAL THREE TIMES A DAY
Refills: 0 | Status: DISCONTINUED | OUTPATIENT
Start: 2019-06-14 | End: 2019-06-18

## 2019-06-14 RX ORDER — DEXTROSE 50 % IN WATER 50 %
15 SYRINGE (ML) INTRAVENOUS ONCE
Refills: 0 | Status: DISCONTINUED | OUTPATIENT
Start: 2019-06-14 | End: 2019-06-18

## 2019-06-14 RX ORDER — DORZOLAMIDE HYDROCHLORIDE TIMOLOL MALEATE 20; 5 MG/ML; MG/ML
1 SOLUTION/ DROPS OPHTHALMIC
Refills: 0 | Status: DISCONTINUED | OUTPATIENT
Start: 2019-06-14 | End: 2019-06-18

## 2019-06-14 RX ORDER — MIDODRINE HYDROCHLORIDE 2.5 MG/1
5 TABLET ORAL THREE TIMES A DAY
Refills: 0 | Status: DISCONTINUED | OUTPATIENT
Start: 2019-06-14 | End: 2019-06-18

## 2019-06-14 RX ORDER — CEFTRIAXONE 500 MG/1
1 INJECTION, POWDER, FOR SOLUTION INTRAMUSCULAR; INTRAVENOUS EVERY 24 HOURS
Refills: 0 | Status: DISCONTINUED | OUTPATIENT
Start: 2019-06-15 | End: 2019-06-18

## 2019-06-14 RX ADMIN — BICALUTAMIDE 50 MILLIGRAM(S): 50 TABLET, FILM COATED ORAL at 17:35

## 2019-06-14 RX ADMIN — SODIUM CHLORIDE 50 MILLILITER(S): 9 INJECTION INTRAMUSCULAR; INTRAVENOUS; SUBCUTANEOUS at 14:13

## 2019-06-14 RX ADMIN — Medication 325 MILLIGRAM(S): at 17:36

## 2019-06-14 RX ADMIN — DORZOLAMIDE HYDROCHLORIDE TIMOLOL MALEATE 1 DROP(S): 20; 5 SOLUTION/ DROPS OPHTHALMIC at 17:37

## 2019-06-14 RX ADMIN — Medication 650 MILLIGRAM(S): at 22:43

## 2019-06-14 RX ADMIN — PANTOPRAZOLE SODIUM 40 MILLIGRAM(S): 20 TABLET, DELAYED RELEASE ORAL at 05:42

## 2019-06-14 RX ADMIN — LATANOPROST 1 DROP(S): 0.05 SOLUTION/ DROPS OPHTHALMIC; TOPICAL at 21:27

## 2019-06-14 RX ADMIN — SODIUM CHLORIDE 75 MILLILITER(S): 9 INJECTION INTRAMUSCULAR; INTRAVENOUS; SUBCUTANEOUS at 08:06

## 2019-06-14 RX ADMIN — Medication 10 MILLIGRAM(S): at 14:13

## 2019-06-14 RX ADMIN — REPAGLINIDE 0.5 MILLIGRAM(S): 1 TABLET ORAL at 21:27

## 2019-06-14 RX ADMIN — DORZOLAMIDE HYDROCHLORIDE TIMOLOL MALEATE 1 DROP(S): 20; 5 SOLUTION/ DROPS OPHTHALMIC at 05:42

## 2019-06-14 RX ADMIN — REPAGLINIDE 0.5 MILLIGRAM(S): 1 TABLET ORAL at 05:43

## 2019-06-14 NOTE — BRIEF OPERATIVE NOTE - NSICDXBRIEFPREOP_GEN_ALL_CORE_FT
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

## 2019-06-14 NOTE — PROGRESS NOTE ADULT - SUBJECTIVE AND OBJECTIVE BOX
Pre-operative Note    - Pre-operative Diagnosis: Gross hematuria    - Procedure: Cystoscopy, possible TURB, possible fulguration of bleeders    - Labs:                        10.0   6.88  )-----------( 173      ( 13 Jun 2019 06:20 )             31.2     06-13    136  |  106  |  31<H>  ----------------------------<  261<H>  4.0   |  22  |  0.96    Ca    8.4<L>      13 Jun 2019 06:20        Type & Screen: Pending    - CXR: Pending this AM    - EKG: Pending this AM    - Blood: Not needed.     - Orders:  > Discussed with Dr. Lou, will need to f/u CXR and EKG preop  > NPO for procedure  > Morning Labs: CBC, BMP, coags, type & screen    - Permits:  > Consent in chart. Dr. Lou discussed with daughter.  > Case scheduled with OR for 1030AM.

## 2019-06-14 NOTE — BRIEF OPERATIVE NOTE - NSICDXBRIEFPOSTOP_GEN_ALL_CORE_FT
POST-OP DIAGNOSIS:  Bladder polyps 14-Jun-2019 13:49:14  Jonn Lou  Unspecified bulbous urethral stricture, male 14-Jun-2019 13:49:05  Jonn Lou

## 2019-06-14 NOTE — PRE-OP CHECKLIST - SELECT TESTS ORDERED
BMP/CBC/PT/PTT/INR/Urinalysis/CMP/POCT Blood Glucose CMP/POCT Blood Glucose/BMP/CBC/INR/Urinalysis/PT/PTT/EKG/CXR

## 2019-06-14 NOTE — CHART NOTE - NSCHARTNOTEFT_GEN_A_CORE
Hospitalist Medicine DNP    CC: rectal temp of 95.5    HPI: patient  presented  with gross hematuria  at  [facility].   s/p 3 units PRBC   s/p Cystoscopy, with TURP and TURB    Received call from RN that pt just came back from OR hypothermic. Pt seen and examined. with good spirit, daughter at bedside. steen draining blood tinged urine.       Vital Signs Last 24 Hrs  T(C): 35.3 (14 Jun 2019 16:45), Max: 36.7 (13 Jun 2019 17:55)  T(F): 95.5 (14 Jun 2019 16:45), Max: 98.1 (13 Jun 2019 17:55)  HR: 94 (14 Jun 2019 16:45) (65 - 94)  BP: 117/59 (14 Jun 2019 16:45) (97/52 - 164/94)  BP(mean): --  RR: 18 (14 Jun 2019 16:45) (12 - 18)  SpO2: 96% (14 Jun 2019 16:45) (96% - 100%)    REVIEW OF SYSTEMS:  RESPIRATORY: No cough, wheezing, chills or hemoptysis; No shortness of breath  CARDIOVASCULAR: No chest pain, palpitations, dizziness, or leg swelling  GASTROINTESTINAL: No abdominal or epigastric pain. No nausea, vomiting, or hematemesis; No diarrhea or constipation. No melena or hematochezia.  GENITOURINARY: hematuria,       PHYSICAL EXAM:  GENERAL: NAD,   NERVOUS SYSTEM:  Alert & Oriented X3, Good concentration   CHEST/LUNG: Clear to percussion bilaterally;  HEART: Regular rate and rhythm; No murmurs, rubs, or gallops  ABDOMEN: Soft, Nontender, Nondistended; Bowel sounds present  : steen draining blood tinged urine    Assessment: Patient 89y with PMHx CVA,  CKD, Hyperlipidemia, Glaucoma, Prostate cancer, HTN,  Diabetes, a/w hematuria, seen for hypothermia    Plan:  - CBC, CMP, Lactate, blood cx, urine cx  - Continue current treatment, IV antibx.   - follow up labs  - D/w Dr. Lou aware and agree with the plan  - will continue to follow up  Time Spent: 30 mins

## 2019-06-15 LAB
ANION GAP SERPL CALC-SCNC: 6 MMOL/L — SIGNIFICANT CHANGE UP (ref 5–17)
APPEARANCE UR: ABNORMAL
BACTERIA # UR AUTO: ABNORMAL
BASOPHILS # BLD AUTO: 0.01 K/UL — SIGNIFICANT CHANGE UP (ref 0–0.2)
BASOPHILS NFR BLD AUTO: 0.1 % — SIGNIFICANT CHANGE UP (ref 0–2)
BILIRUB UR-MCNC: NEGATIVE — SIGNIFICANT CHANGE UP
BUN SERPL-MCNC: 29 MG/DL — HIGH (ref 7–23)
CALCIUM SERPL-MCNC: 7.9 MG/DL — LOW (ref 8.5–10.1)
CHLORIDE SERPL-SCNC: 110 MMOL/L — HIGH (ref 96–108)
CO2 SERPL-SCNC: 23 MMOL/L — SIGNIFICANT CHANGE UP (ref 22–31)
COLOR SPEC: ABNORMAL
CREAT SERPL-MCNC: 0.98 MG/DL — SIGNIFICANT CHANGE UP (ref 0.5–1.3)
CULTURE RESULTS: NO GROWTH — SIGNIFICANT CHANGE UP
DIFF PNL FLD: ABNORMAL
EOSINOPHIL # BLD AUTO: 0.05 K/UL — SIGNIFICANT CHANGE UP (ref 0–0.5)
EOSINOPHIL NFR BLD AUTO: 0.6 % — SIGNIFICANT CHANGE UP (ref 0–6)
EPI CELLS # UR: SIGNIFICANT CHANGE UP
GLUCOSE BLDC GLUCOMTR-MCNC: 143 MG/DL — HIGH (ref 70–99)
GLUCOSE BLDC GLUCOMTR-MCNC: 191 MG/DL — HIGH (ref 70–99)
GLUCOSE BLDC GLUCOMTR-MCNC: 249 MG/DL — HIGH (ref 70–99)
GLUCOSE SERPL-MCNC: 227 MG/DL — HIGH (ref 70–99)
GLUCOSE UR QL: 250 MG/DL
HCT VFR BLD CALC: 29 % — LOW (ref 39–50)
HGB BLD-MCNC: 9.2 G/DL — LOW (ref 13–17)
IMM GRANULOCYTES NFR BLD AUTO: 2.7 % — HIGH (ref 0–1.5)
KETONES UR-MCNC: ABNORMAL
LEUKOCYTE ESTERASE UR-ACNC: ABNORMAL
LYMPHOCYTES # BLD AUTO: 1.16 K/UL — SIGNIFICANT CHANGE UP (ref 1–3.3)
LYMPHOCYTES # BLD AUTO: 13.5 % — SIGNIFICANT CHANGE UP (ref 13–44)
MCHC RBC-ENTMCNC: 27.9 PG — SIGNIFICANT CHANGE UP (ref 27–34)
MCHC RBC-ENTMCNC: 31.7 GM/DL — LOW (ref 32–36)
MCV RBC AUTO: 87.9 FL — SIGNIFICANT CHANGE UP (ref 80–100)
MONOCYTES # BLD AUTO: 0.71 K/UL — SIGNIFICANT CHANGE UP (ref 0–0.9)
MONOCYTES NFR BLD AUTO: 8.3 % — SIGNIFICANT CHANGE UP (ref 2–14)
NEUTROPHILS # BLD AUTO: 6.43 K/UL — SIGNIFICANT CHANGE UP (ref 1.8–7.4)
NEUTROPHILS NFR BLD AUTO: 74.8 % — SIGNIFICANT CHANGE UP (ref 43–77)
NITRITE UR-MCNC: NEGATIVE — SIGNIFICANT CHANGE UP
NRBC # BLD: 0 /100 WBCS — SIGNIFICANT CHANGE UP (ref 0–0)
PH UR: 5 — SIGNIFICANT CHANGE UP (ref 5–8)
PLATELET # BLD AUTO: 197 K/UL — SIGNIFICANT CHANGE UP (ref 150–400)
POTASSIUM SERPL-MCNC: 4.3 MMOL/L — SIGNIFICANT CHANGE UP (ref 3.5–5.3)
POTASSIUM SERPL-SCNC: 4.3 MMOL/L — SIGNIFICANT CHANGE UP (ref 3.5–5.3)
PROT UR-MCNC: 100 MG/DL
RBC # BLD: 3.3 M/UL — LOW (ref 4.2–5.8)
RBC # FLD: 16 % — HIGH (ref 10.3–14.5)
RBC CASTS # UR COMP ASSIST: ABNORMAL /HPF (ref 0–4)
SODIUM SERPL-SCNC: 139 MMOL/L — SIGNIFICANT CHANGE UP (ref 135–145)
SP GR SPEC: 1.01 — SIGNIFICANT CHANGE UP (ref 1.01–1.02)
SPECIMEN SOURCE: SIGNIFICANT CHANGE UP
UROBILINOGEN FLD QL: 1 MG/DL
WBC # BLD: 8.59 K/UL — SIGNIFICANT CHANGE UP (ref 3.8–10.5)
WBC # FLD AUTO: 8.59 K/UL — SIGNIFICANT CHANGE UP (ref 3.8–10.5)
WBC UR QL: >50

## 2019-06-15 RX ADMIN — CEFTRIAXONE 100 GRAM(S): 500 INJECTION, POWDER, FOR SOLUTION INTRAMUSCULAR; INTRAVENOUS at 00:03

## 2019-06-15 RX ADMIN — REPAGLINIDE 0.5 MILLIGRAM(S): 1 TABLET ORAL at 08:36

## 2019-06-15 RX ADMIN — BICALUTAMIDE 50 MILLIGRAM(S): 50 TABLET, FILM COATED ORAL at 11:43

## 2019-06-15 RX ADMIN — DORZOLAMIDE HYDROCHLORIDE TIMOLOL MALEATE 1 DROP(S): 20; 5 SOLUTION/ DROPS OPHTHALMIC at 18:05

## 2019-06-15 RX ADMIN — Medication 1: at 08:36

## 2019-06-15 RX ADMIN — CEFTRIAXONE 100 GRAM(S): 500 INJECTION, POWDER, FOR SOLUTION INTRAMUSCULAR; INTRAVENOUS at 23:22

## 2019-06-15 RX ADMIN — LATANOPROST 1 DROP(S): 0.05 SOLUTION/ DROPS OPHTHALMIC; TOPICAL at 21:24

## 2019-06-15 RX ADMIN — MIDODRINE HYDROCHLORIDE 5 MILLIGRAM(S): 2.5 TABLET ORAL at 21:24

## 2019-06-15 RX ADMIN — PANTOPRAZOLE SODIUM 40 MILLIGRAM(S): 20 TABLET, DELAYED RELEASE ORAL at 06:21

## 2019-06-15 RX ADMIN — REPAGLINIDE 0.5 MILLIGRAM(S): 1 TABLET ORAL at 16:12

## 2019-06-15 RX ADMIN — Medication 2: at 11:42

## 2019-06-15 RX ADMIN — MIDODRINE HYDROCHLORIDE 5 MILLIGRAM(S): 2.5 TABLET ORAL at 16:12

## 2019-06-15 RX ADMIN — DORZOLAMIDE HYDROCHLORIDE TIMOLOL MALEATE 1 DROP(S): 20; 5 SOLUTION/ DROPS OPHTHALMIC at 06:21

## 2019-06-15 RX ADMIN — MIDODRINE HYDROCHLORIDE 5 MILLIGRAM(S): 2.5 TABLET ORAL at 06:21

## 2019-06-15 RX ADMIN — REPAGLINIDE 0.5 MILLIGRAM(S): 1 TABLET ORAL at 21:24

## 2019-06-15 RX ADMIN — Medication 325 MILLIGRAM(S): at 11:43

## 2019-06-15 NOTE — CHART NOTE - NSCHARTNOTEFT_GEN_A_CORE
Assessment: Pt seen for follow-up & continues chronic severe malnutrition. Pt with hx DM & prostate cancer presents with hematuria & CKD. s/p TURP & TURBT 6/14 pending possible Nephrostomy 6/17 due to Rt hydronephrosis.     Factors impacting intake: [x ] none [ ] nausea  [ ] vomiting [ ] diarrhea [ ] constipation  [ ]chewing problems [ ] swallowing issues  [ ] other:     Diet Prescription: Diet, Consistent Carbohydrate w/Evening Snack:   Low Sodium  Supplement Feeding Modality:  Oral  Glucerna Shake Cans or Servings Per Day:  1       Frequency:  Two Times a day (06-14-19 @ 13:49) (440kcal & 20gm protein)  Diet, NPO:   NPO for Procedure/Test     NPO Start Date: 16-Jun-2019,   NPO Start Time: 23:59  Except Medications (06-14-19 @ 14:30)    Intake:  Po intake 50-75% meals & 25% supplements feeds self pc tray prep.  Pt wishes to continue Glucerna shake. Pt tolerating po diet well; last BM x 1(6/13).     Current Weight: Weight (kg): Wt=46.9kg(6/15) Wt=47.2 (06-14 @ 02:49), Wt=47.3kg(6/8)  % Weight Change 0.4kg wt loss x 1 week    Physical appearance: No edema    Pertinent Medications: MEDICATIONS  (STANDING):  bicalutamide 50 milliGRAM(s) Oral daily  cefTRIAXone   IVPB 1 Gram(s) IV Intermittent every 24 hours  dextrose 50% Injectable 12.5 Gram(s) IV Push once  dextrose 50% Injectable 25 Gram(s) IV Push once  dextrose 50% Injectable 25 Gram(s) IV Push once  dorzolamide 2%/timolol 0.5% Ophthalmic Solution 1 Drop(s) Both EYES two times a day  ferrous    sulfate 325 milliGRAM(s) Oral daily  insulin lispro (HumaLOG) corrective regimen sliding scale   SubCutaneous three times a day before meals  latanoprost 0.005% Ophthalmic Solution 1 Drop(s) Both EYES at bedtime  midodrine 5 milliGRAM(s) Oral three times a day  pantoprazole    Tablet 40 milliGRAM(s) Oral before breakfast  repaglinide 0.5 milliGRAM(s) Oral three times a day  sodium chloride 0.9%. 1000 milliLiter(s) (250 mL/Hr) IV Continuous <Continuous>    MEDICATIONS  (PRN):  dextrose 40% Gel 15 Gram(s) Oral once PRN Blood Glucose LESS THAN 70 milliGRAM(s)/deciliter  glucagon  Injectable 1 milliGRAM(s) IntraMuscular once PRN Glucose LESS THAN 70 milligrams/deciliter    Pertinent Labs: 06-15 Na 139 mmol/L Glu 227 mg/dL<H> K+ 4.3 mmol/L Cr 0.98 mg/dL BUN 29 mg/dL<H> Phos n/a   Alb 2.8(6/14)   PAB n/a   Hgb 9.2 g/dL<L> Hct 29.0 %<L> HgA1C n/a    Glucose, Serum: 227 mg/dL <H>  Glucose, Serum: 223 mg/dL <H>  Glucose, Serum: 85 mg/dL   24Hr FS:275 mg/dL  109 mg/dL  94 mg/dL    Skin: Sacrum stage I    Estimated Needs:   [x ] no change since previous assessment (6/11/19)  [ ] recalculated:     Previous Nutrition Diagnosis: · Nutrient: Malnutrition; Severe malnutrition in the context of chronic illness  · Etiology: Inadequate energy & protein intake related to hx metastatic prostate cancer & CKD  · Signs/Symptoms: Sign wt loss 20% x 10 months; po intake <50% nutr'l needs [id number]; severe muscle wasting  · Goal/Expected Outcome: Pt to consume >75% meals/supplements; improved   deter further unintentional wt loss; met    Nutrition Diagnosis is [x ] ongoing  [ ] resolved  [ ] improved  [ ] not applicable       New Nutrition Diagnosis: [x ] not applicable       Interventions:   Recommend  [x ] Continue: CCHO with snack/Low Na/Glucerna shake 2 x day(440kcal & 20gm protein)  [ ] Change Diet To:  [ ] Nutrition Supplement:  [ ] Nutrition Support:  [ x] Other: Cater to food preferences.    Monitoring and Evaluation:   [x ] PO intake [ x ] Tolerance to diet prescription [ x ] weights [ x ] labs[ x ] follow up per protocol  [ ] other:

## 2019-06-15 NOTE — PROGRESS NOTE ADULT - SUBJECTIVE AND OBJECTIVE BOX
Patient seen and examined at bedside this AM, resting comfortably.   Pt states that his steen was removed this AM and has been unable to urinate. States he feels urge to urinate but produces very little when he attempts.   Per RN, pt had steen removed ~2 hours prior to my exam and has had very little fluid intake since.   Pt denies any suprapubic or abdominal discomfort.   Denies fevers, chills, dyspnea, chest pain, nausea or vomiting, hematuria, dysuria. Tolerating diet.      T(F): 98 (06-15-19 @ 10:46), Max: 100.3 (06-14-19 @ 22:08)  HR: 69 (06-15-19 @ 10:46) (69 - 107)  BP: 101/73 (06-15-19 @ 10:46) (96/50 - 164/94)  RR: 17 (06-15-19 @ 10:46) (12 - 18)  SpO2: 98% (06-15-19 @ 10:46) (95% - 100%)  Wt(kg): --  CAPILLARY BLOOD GLUCOSE      POCT Blood Glucose.: 191 mg/dL (15 Kevin 2019 08:29)  POCT Blood Glucose.: 275 mg/dL (14 Jun 2019 21:18)  POCT Blood Glucose.: 109 mg/dL (14 Jun 2019 16:18)        PHYSICAL EXAM:  General: NAD, A&Ox3  CV: +S1S2 regular rate and rhythm  Lung: respirations nonlabored  Abdomen: non-distended, +BS, soft, nontender to palpation   Extremities: no pedal edema or calf tenderness noted   : Urinal at bedside noted with small amount of clear yellow urine. uncircumcised phallus, adequate meatus. no suprapubic or CVA tenderness.    LABS:                        9.2    8.59  )-----------( 197      ( 15 Kevin 2019 06:21 )             29.0     06-15    139  |  110<H>  |  29<H>  ----------------------------<  227<H>  4.3   |  23  |  0.98    Ca    7.9<L>      15 Kevin 2019 06:21    TPro  6.2  /  Alb  2.8<L>  /  TBili  0.3  /  DBili  x   /  AST  16  /  ALT  14  /  AlkPhos  1184<H>  06-14    PT/INR - ( 14 Jun 2019 08:55 )   PT: 12.1 sec;   INR: 1.08 ratio         PTT - ( 14 Jun 2019 08:55 )  PTT:30.6 sec  I&O's Detail    14 Jun 2019 07:01  -  15 Kevin 2019 07:00  --------------------------------------------------------  IN:    sodium chloride 0.9%: 750 mL  Total IN: 750 mL    OUT:    Indwelling Catheter - Urethral: 1270 mL    Voided: 250 mL  Total OUT: 1520 mL    Total NET: -770 mL        Impression: 89M admitted with 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 POD#1 s/p cystoscopy, with TURP and TURBT    Plan:   - Steen d/rogelio this AM, TOV. Measure PVR  - Continue antibiotics   - H/H 9.2/29, continue to trend  - Continue management per primary team    - Pt to have percutaneous insertion of right ureteral stent Monday

## 2019-06-15 NOTE — PROGRESS NOTE ADULT - SUBJECTIVE AND OBJECTIVE BOX
INTERVAL HPI/OVERNIGHT EVENTS:  s/p Cystoscopy  TURB    s/p  steen  cathater  removal this  am  not  yet  voiuding    REVIEW OF SYSTEMS:  CONSTITUTIONAL:  no  complaints    NECK: No pain or stiffnes  RESPIRATORY: No SOB   CARDIOVASCULAR: No chest pain, palpitations, dizziness,   GASTROINTESTINAL: No abdominal pain. No nausea, vomiting,   NEUROLOGICAL: No headaches, no  blurry  vision no  dizziness  SKIN: No itching,   MUSCULOSKELETAL: No pain    MEDICATION:  bicalutamide 50 milliGRAM(s) Oral daily  cefTRIAXone   IVPB 1 Gram(s) IV Intermittent every 24 hours  dextrose 40% Gel 15 Gram(s) Oral once PRN  dextrose 50% Injectable 12.5 Gram(s) IV Push once  dextrose 50% Injectable 25 Gram(s) IV Push once  dextrose 50% Injectable 25 Gram(s) IV Push once  dorzolamide 2%/timolol 0.5% Ophthalmic Solution 1 Drop(s) Both EYES two times a day  ferrous    sulfate 325 milliGRAM(s) Oral daily  glucagon  Injectable 1 milliGRAM(s) IntraMuscular once PRN  insulin lispro (HumaLOG) corrective regimen sliding scale   SubCutaneous three times a day before meals  latanoprost 0.005% Ophthalmic Solution 1 Drop(s) Both EYES at bedtime  midodrine 5 milliGRAM(s) Oral three times a day  pantoprazole    Tablet 40 milliGRAM(s) Oral before breakfast  repaglinide 0.5 milliGRAM(s) Oral three times a day  sodium chloride 0.9%. 1000 milliLiter(s) IV Continuous <Continuous>    Vital Signs Last 24 Hrs  T(C): 36.4 (15 Kevin 2019 06:15), Max: 37.9 (2019 22:08)  T(F): 97.5 (15 Kevin 2019 06:15), Max: 100.3 (2019 22:08)  HR: 93 (15 Kevin 2019 06:15) (65 - 107)  BP: 96/50 (15 Kevin 2019 06:15) (96/50 - 164/94)  BP(mean): --  RR: 16 (15 Kevin 2019 06:15) (12 - 18)  SpO2: 100% (15 Kevin 2019 06:15) (95% - 100%)    PHYSICAL EXAM:  GENERAL: NAD, well-groomed, well-developed  EYES:  conjunctiva and sclera clear  ENMT:  Moist mucous membranes,   NECK: Supple, No JVD, Normal thyroid  NERVOUS SYSTEM:  Alert oriented  x2 no  focal  deficits;   CHEST/LUNG: Clear    HEART: Regular rate and rhythm; No murmurs, rubs, or gallops  ABDOMEN: Soft, Nontender, Nondistended; Bowel sounds present  EXTREMITIES:  no  edema no  tenderness  SKIN: No rashes   LABS:                        9.2    8.59  )-----------( 197      ( 15 Kevin 2019 06:21 )             29.0     06-15    139  |  110<H>  |  29<H>  ----------------------------<  227<H>  4.3   |  23  |  0.98    Ca    7.9<L>      15 Kevin 2019 06:21    TPro  6.2  /  Alb  2.8<L>  /  TBili  0.3  /  DBili  x   /  AST  16  /  ALT  14  /  AlkPhos  1184<H>  06-14    PT/INR - ( 2019 08:55 )   PT: 12.1 sec;   INR: 1.08 ratio         PTT - ( 2019 08:55 )  PTT:30.6 sec  Urinalysis Basic - ( 15 Kevin 2019 02:12 )    Color: Araceli / Appearance: bloody / S.015 / pH: x  Gluc: x / Ketone: Trace  / Bili: Negative / Urobili: 1 mg/dL   Blood: x / Protein: 100 mg/dL / Nitrite: Negative   Leuk Esterase: Moderate / RBC: 3-5 /HPF / WBC >50   Sq Epi: x / Non Sq Epi: Occasional / Bacteria: Occasional      CAPILLARY BLOOD GLUCOSE      POCT Blood Glucose.: 275 mg/dL (2019 21:18)  POCT Blood Glucose.: 109 mg/dL (2019 16:18)  POCT Blood Glucose.: 94 mg/dL (2019 10:55)      RADIOLOGY & ADDITIONAL TESTS:    Imaging reports  Personally Reviewed:  [ x] YES  [ ] NO    Consultant(s) Notes Reviewed:  [x ] YES  [ ] NO    Care Discussed with Consultants/Other Providers [x ] YES  [ ] NO  Assessment and Plan:   Problem/Plan - 1:  ·  Problem: Symptomatic anemia. gross  hematuria  Plan: IVF  monitor  labs  results  of  cystsocopy  discussed  with  urology for Rt  nephrostomy  by  IRR     Problem/Plan - 2:  ·  Problem: Hematuria.  Plan: as  above     Problem/Plan - 3:  ·  Problem: Diabetes.  Plan: fs  monitor.     Problem/Plan - 4:  ·  Problem: Prostate cancer.  Plan: observation. on  casodex    Problem/Plan - 5:  ·  Problem: CKD (chronic kidney disease).  Plan: hydration.     Problem/Plan - 6:  Problem: HTN (hypertension). Plan: observation  off  meds patient  hypotensive.

## 2019-06-16 LAB
ANION GAP SERPL CALC-SCNC: 6 MMOL/L — SIGNIFICANT CHANGE UP (ref 5–17)
BUN SERPL-MCNC: 26 MG/DL — HIGH (ref 7–23)
CALCIUM SERPL-MCNC: 8.1 MG/DL — LOW (ref 8.5–10.1)
CHLORIDE SERPL-SCNC: 110 MMOL/L — HIGH (ref 96–108)
CO2 SERPL-SCNC: 23 MMOL/L — SIGNIFICANT CHANGE UP (ref 22–31)
CREAT SERPL-MCNC: 0.8 MG/DL — SIGNIFICANT CHANGE UP (ref 0.5–1.3)
CULTURE RESULTS: NO GROWTH — SIGNIFICANT CHANGE UP
GLUCOSE BLDC GLUCOMTR-MCNC: 230 MG/DL — HIGH (ref 70–99)
GLUCOSE BLDC GLUCOMTR-MCNC: 89 MG/DL — SIGNIFICANT CHANGE UP (ref 70–99)
GLUCOSE BLDC GLUCOMTR-MCNC: 89 MG/DL — SIGNIFICANT CHANGE UP (ref 70–99)
GLUCOSE SERPL-MCNC: 93 MG/DL — SIGNIFICANT CHANGE UP (ref 70–99)
HCT VFR BLD CALC: 26.5 % — LOW (ref 39–50)
HGB BLD-MCNC: 8.3 G/DL — LOW (ref 13–17)
MCHC RBC-ENTMCNC: 27.6 PG — SIGNIFICANT CHANGE UP (ref 27–34)
MCHC RBC-ENTMCNC: 31.3 GM/DL — LOW (ref 32–36)
MCV RBC AUTO: 88 FL — SIGNIFICANT CHANGE UP (ref 80–100)
NRBC # BLD: 0 /100 WBCS — SIGNIFICANT CHANGE UP (ref 0–0)
PLATELET # BLD AUTO: 174 K/UL — SIGNIFICANT CHANGE UP (ref 150–400)
POTASSIUM SERPL-MCNC: 3.9 MMOL/L — SIGNIFICANT CHANGE UP (ref 3.5–5.3)
POTASSIUM SERPL-SCNC: 3.9 MMOL/L — SIGNIFICANT CHANGE UP (ref 3.5–5.3)
RBC # BLD: 3.01 M/UL — LOW (ref 4.2–5.8)
RBC # FLD: 15.9 % — HIGH (ref 10.3–14.5)
SODIUM SERPL-SCNC: 139 MMOL/L — SIGNIFICANT CHANGE UP (ref 135–145)
SPECIMEN SOURCE: SIGNIFICANT CHANGE UP
WBC # BLD: 7.66 K/UL — SIGNIFICANT CHANGE UP (ref 3.8–10.5)
WBC # FLD AUTO: 7.66 K/UL — SIGNIFICANT CHANGE UP (ref 3.8–10.5)

## 2019-06-16 RX ADMIN — REPAGLINIDE 0.5 MILLIGRAM(S): 1 TABLET ORAL at 21:57

## 2019-06-16 RX ADMIN — MIDODRINE HYDROCHLORIDE 5 MILLIGRAM(S): 2.5 TABLET ORAL at 05:41

## 2019-06-16 RX ADMIN — REPAGLINIDE 0.5 MILLIGRAM(S): 1 TABLET ORAL at 05:41

## 2019-06-16 RX ADMIN — LATANOPROST 1 DROP(S): 0.05 SOLUTION/ DROPS OPHTHALMIC; TOPICAL at 21:57

## 2019-06-16 RX ADMIN — REPAGLINIDE 0.5 MILLIGRAM(S): 1 TABLET ORAL at 14:07

## 2019-06-16 RX ADMIN — DORZOLAMIDE HYDROCHLORIDE TIMOLOL MALEATE 1 DROP(S): 20; 5 SOLUTION/ DROPS OPHTHALMIC at 18:58

## 2019-06-16 RX ADMIN — BICALUTAMIDE 50 MILLIGRAM(S): 50 TABLET, FILM COATED ORAL at 11:41

## 2019-06-16 RX ADMIN — PANTOPRAZOLE SODIUM 40 MILLIGRAM(S): 20 TABLET, DELAYED RELEASE ORAL at 08:32

## 2019-06-16 RX ADMIN — DORZOLAMIDE HYDROCHLORIDE TIMOLOL MALEATE 1 DROP(S): 20; 5 SOLUTION/ DROPS OPHTHALMIC at 05:41

## 2019-06-16 RX ADMIN — Medication 2: at 11:40

## 2019-06-16 RX ADMIN — Medication 325 MILLIGRAM(S): at 11:40

## 2019-06-16 RX ADMIN — MIDODRINE HYDROCHLORIDE 5 MILLIGRAM(S): 2.5 TABLET ORAL at 14:07

## 2019-06-16 NOTE — PROGRESS NOTE ADULT - SUBJECTIVE AND OBJECTIVE BOX
INTERVAL HPI/OVERNIGHT EVENTS:  steen  reinserted  2/2 urinary  retention      REVIEW OF SYSTEMS:  CONSTITUTIONAL:  no  complaints    NECK: No pain or stiffnes  RESPIRATORY: No SOB   CARDIOVASCULAR: No chest pain, palpitations, dizziness,   GASTROINTESTINAL: No abdominal pain. No nausea, vomiting,   NEUROLOGICAL: No headaches, no  blurry  vision no  dizziness  SKIN: No itching,   MUSCULOSKELETAL: No pain    MEDICATION:  bicalutamide 50 milliGRAM(s) Oral daily  cefTRIAXone   IVPB 1 Gram(s) IV Intermittent every 24 hours  dextrose 40% Gel 15 Gram(s) Oral once PRN  dextrose 50% Injectable 12.5 Gram(s) IV Push once  dextrose 50% Injectable 25 Gram(s) IV Push once  dextrose 50% Injectable 25 Gram(s) IV Push once  dorzolamide 2%/timolol 0.5% Ophthalmic Solution 1 Drop(s) Both EYES two times a day  ferrous    sulfate 325 milliGRAM(s) Oral daily  glucagon  Injectable 1 milliGRAM(s) IntraMuscular once PRN  insulin lispro (HumaLOG) corrective regimen sliding scale   SubCutaneous three times a day before meals  latanoprost 0.005% Ophthalmic Solution 1 Drop(s) Both EYES at bedtime  midodrine 5 milliGRAM(s) Oral three times a day  pantoprazole    Tablet 40 milliGRAM(s) Oral before breakfast  repaglinide 0.5 milliGRAM(s) Oral three times a day  sodium chloride 0.9%. 1000 milliLiter(s) IV Continuous <Continuous>    Vital Signs Last 24 Hrs  T(C): 36.5 (2019 05:45), Max: 37.1 (15 Kevin 2019 23:35)  T(F): 97.7 (2019 05:45), Max: 98.8 (15 Kevin 2019 23:35)  HR: 73 (2019 05:45) (69 - 75)  BP: 93/44 (2019 05:45) (93/44 - 109/43)  BP(mean): --  RR: 18 (2019 05:45) (17 - 18)  SpO2: 98% (2019 05:45) (98% - 99%)    PHYSICAL EXAM:  GENERAL: NAD, well-groomed, well-developed  EYES:  conjunctiva and sclera clear  ENMT:  Moist mucous membranes,   NECK: Supple, No JVD, Normal thyroid  NERVOUS SYSTEM:  Alert oriented x2  no  focal  deficits;   CHEST/LUNG: Clear    HEART: Regular rate and rhythm; No murmurs, rubs, or gallops  ABDOMEN: Soft, Nontender, Nondistended; Bowel sounds present  EXTREMITIES:  no  edema no  tenderness  SKIN: No rashes   LABS:                        8.3    7.66  )-----------( 174      ( 2019 07:33 )             26.5     06-16    139  |  110<H>  |  26<H>  ----------------------------<  93  3.9   |  23  |  0.80    Ca    8.1<L>      2019 07:33    TPro  6.2  /  Alb  2.8<L>  /  TBili  0.3  /  DBili  x   /  AST  16  /  ALT  14  /  AlkPhos  1184<H>  06      Urinalysis Basic - ( 15 Kevin 2019 02:12 )    Color: Araceli / Appearance: bloody / S.015 / pH: x  Gluc: x / Ketone: Trace  / Bili: Negative / Urobili: 1 mg/dL   Blood: x / Protein: 100 mg/dL / Nitrite: Negative   Leuk Esterase: Moderate / RBC: 3-5 /HPF / WBC >50   Sq Epi: x / Non Sq Epi: Occasional / Bacteria: Occasional      CAPILLARY BLOOD GLUCOSE      POCT Blood Glucose.: 89 mg/dL (2019 08:17)  POCT Blood Glucose.: 143 mg/dL (15 Kevin 2019 16:09)  POCT Blood Glucose.: 249 mg/dL (15 Kevin 2019 11:18)      RADIOLOGY & ADDITIONAL TESTS:    Imaging reports  Personally Reviewed:  [x ] YES  [ ] NO    Consultant(s) Notes Reviewed:  [x] YES  [ ] NO    Care Discussed with Consultants/Other Providers [ x] YES  [ ] NO  Assessment and Plan:   Problem/Plan - 1:  ·  Problem: Symptomatic anemia. gross  hematuria  Plan: IVF  monitor  labs  results  of  cystsocopy  discussed  with  urology for Rt  nephrostomy  by  IR     Problem/Plan - 2:  ·  Problem: Hematuria.  Plan: as  above     Problem/Plan - 3:  ·  Problem: Diabetes.  Plan: fs  monitor.     Problem/Plan - 4:  ·  Problem: Prostate cancer.  Plan: observation. on  casodex    Problem/Plan - 5:  ·  Problem: CKD (chronic kidney disease).  Plan: hydration.     Problem/Plan - 6:  Problem: HTN (hypertension). Plan: observation  off  meds patient  hypotensive.  urinary  retention  steen  cathather

## 2019-06-16 NOTE — PROGRESS NOTE ADULT - SUBJECTIVE AND OBJECTIVE BOX
Patient seen and examined bedside resting comfortably.  No complaints offered.  Denies pain.   Tolerating diet, denies N/V.   Steen draining clear yellow urine.    T(F): 97.7 (06-16-19 @ 05:45), Max: 98.8 (06-15-19 @ 23:35)  HR: 73 (06-16-19 @ 05:45) (69 - 75)  BP: 93/44 (06-16-19 @ 05:45) (93/44 - 109/43)  RR: 18 (06-16-19 @ 05:45) (17 - 18)  SpO2: 98% (06-16-19 @ 05:45) (98% - 99%)    POCT Blood Glucose.: 89 mg/dL (16 Jun 2019 08:17)  POCT Blood Glucose.: 143 mg/dL (15 Kevin 2019 16:09)  POCT Blood Glucose.: 249 mg/dL (15 Kevin 2019 11:18)      PHYSICAL EXAM:    General: NAD, alert and awake  Chest: nonlabored respirations  Abdomen: soft, NT/ND.   Extremities: Calf soft, nontender b/l.   : No suprapubic tenderness or bladder distention. Steen draining clear yellow urine.     LABS:                        8.3    7.66  )-----------( 174      ( 16 Jun 2019 07:33 )             26.5   06-16    139  |  110<H>  |  26<H>  ----------------------------<  93  3.9   |  23  |  0.80    Ca    8.1<L>      16 Jun 2019 07:33    TPro  6.2  /  Alb  2.8<L>  /  TBili  0.3  /  DBili  x   /  AST  16  /  ALT  14  /  AlkPhos  1184<H>  06-14    I&O's Detail    15 Kevin 2019 07:01  -  16 Jun 2019 07:00  --------------------------------------------------------  IN:  Total IN: 0 mL    OUT:    Indwelling Catheter - Urethral: 1200 mL  Total OUT: 1200 mL    Total NET: -1200 mL      Impression: 89M admitted with 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 Failed TOV, steen reinserted 5/15.  Urine cytology: NEG for malignant cells    Plan:   - Steen catheter, monitor urine output  - Continue antibiotics   - Casodex  - f/u labs, trend renal function, monitor H/H  - Continue management per primary team    - Pt to have IR percutaneous insertion of right ureteral stent Monday Patient seen and examined bedside resting comfortably.  No complaints offered.  Denies pain.   Tolerating diet, denies N/V.   Steen draining clear yellow urine.    T(F): 97.7 (06-16-19 @ 05:45), Max: 98.8 (06-15-19 @ 23:35)  HR: 73 (06-16-19 @ 05:45) (69 - 75)  BP: 93/44 (06-16-19 @ 05:45) (93/44 - 109/43)  RR: 18 (06-16-19 @ 05:45) (17 - 18)  SpO2: 98% (06-16-19 @ 05:45) (98% - 99%)    POCT Blood Glucose.: 89 mg/dL (16 Jun 2019 08:17)  POCT Blood Glucose.: 143 mg/dL (15 Kevin 2019 16:09)  POCT Blood Glucose.: 249 mg/dL (15 Kevin 2019 11:18)      PHYSICAL EXAM:    General: NAD, alert and awake  Chest: nonlabored respirations  Abdomen: soft, NT/ND.   Extremities: Calf soft, nontender b/l.   : No suprapubic tenderness or bladder distention. Steen draining clear yellow urine.     LABS:                        8.3    7.66  )-----------( 174      ( 16 Jun 2019 07:33 )             26.5   06-16    139  |  110<H>  |  26<H>  ----------------------------<  93  3.9   |  23  |  0.80    Ca    8.1<L>      16 Jun 2019 07:33    TPro  6.2  /  Alb  2.8<L>  /  TBili  0.3  /  DBili  x   /  AST  16  /  ALT  14  /  AlkPhos  1184<H>  06-14    I&O's Detail    15 Kevin 2019 07:01  -  16 Jun 2019 07:00  --------------------------------------------------------  IN:  Total IN: 0 mL    OUT:    Indwelling Catheter - Urethral: 1200 mL  Total OUT: 1200 mL    Total NET: -1200 mL      Impression: 89M admitted with 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 Failed TOV, steen reinserted 5/15.  Urine cytology: NEG for malignant cells    Plan:   - D/C steen cathter for repeat TOV today, f/u PVR  - Continue antibiotics   - Casodex  - trend renal function  - Continue management per primary team    - will f/u AM H/H, and continue to monitor for any bleeding-- patient may need IR percutaneous insertion of right ureteral stent Monday   - Discussed with Dr. Lou

## 2019-06-17 LAB
ANION GAP SERPL CALC-SCNC: 11 MMOL/L — SIGNIFICANT CHANGE UP (ref 5–17)
BUN SERPL-MCNC: 24 MG/DL — HIGH (ref 7–23)
CALCIUM SERPL-MCNC: 8.2 MG/DL — LOW (ref 8.5–10.1)
CHLORIDE SERPL-SCNC: 109 MMOL/L — HIGH (ref 96–108)
CO2 SERPL-SCNC: 21 MMOL/L — LOW (ref 22–31)
CREAT SERPL-MCNC: 0.88 MG/DL — SIGNIFICANT CHANGE UP (ref 0.5–1.3)
GLUCOSE BLDC GLUCOMTR-MCNC: 185 MG/DL — HIGH (ref 70–99)
GLUCOSE BLDC GLUCOMTR-MCNC: 209 MG/DL — HIGH (ref 70–99)
GLUCOSE BLDC GLUCOMTR-MCNC: 87 MG/DL — SIGNIFICANT CHANGE UP (ref 70–99)
GLUCOSE BLDC GLUCOMTR-MCNC: 95 MG/DL — SIGNIFICANT CHANGE UP (ref 70–99)
GLUCOSE SERPL-MCNC: 107 MG/DL — HIGH (ref 70–99)
HCT VFR BLD CALC: 27.5 % — LOW (ref 39–50)
HGB BLD-MCNC: 8.7 G/DL — LOW (ref 13–17)
MCHC RBC-ENTMCNC: 27.9 PG — SIGNIFICANT CHANGE UP (ref 27–34)
MCHC RBC-ENTMCNC: 31.6 GM/DL — LOW (ref 32–36)
MCV RBC AUTO: 88.1 FL — SIGNIFICANT CHANGE UP (ref 80–100)
NRBC # BLD: 0 /100 WBCS — SIGNIFICANT CHANGE UP (ref 0–0)
PLATELET # BLD AUTO: 182 K/UL — SIGNIFICANT CHANGE UP (ref 150–400)
POTASSIUM SERPL-MCNC: 4.1 MMOL/L — SIGNIFICANT CHANGE UP (ref 3.5–5.3)
POTASSIUM SERPL-SCNC: 4.1 MMOL/L — SIGNIFICANT CHANGE UP (ref 3.5–5.3)
RBC # BLD: 3.12 M/UL — LOW (ref 4.2–5.8)
RBC # FLD: 15.8 % — HIGH (ref 10.3–14.5)
SODIUM SERPL-SCNC: 141 MMOL/L — SIGNIFICANT CHANGE UP (ref 135–145)
WBC # BLD: 7.29 K/UL — SIGNIFICANT CHANGE UP (ref 3.8–10.5)
WBC # FLD AUTO: 7.29 K/UL — SIGNIFICANT CHANGE UP (ref 3.8–10.5)

## 2019-06-17 RX ADMIN — Medication 325 MILLIGRAM(S): at 11:10

## 2019-06-17 RX ADMIN — MIDODRINE HYDROCHLORIDE 5 MILLIGRAM(S): 2.5 TABLET ORAL at 05:41

## 2019-06-17 RX ADMIN — DORZOLAMIDE HYDROCHLORIDE TIMOLOL MALEATE 1 DROP(S): 20; 5 SOLUTION/ DROPS OPHTHALMIC at 05:39

## 2019-06-17 RX ADMIN — CEFTRIAXONE 100 GRAM(S): 500 INJECTION, POWDER, FOR SOLUTION INTRAMUSCULAR; INTRAVENOUS at 01:31

## 2019-06-17 RX ADMIN — REPAGLINIDE 0.5 MILLIGRAM(S): 1 TABLET ORAL at 05:39

## 2019-06-17 RX ADMIN — PANTOPRAZOLE SODIUM 40 MILLIGRAM(S): 20 TABLET, DELAYED RELEASE ORAL at 08:07

## 2019-06-17 RX ADMIN — LATANOPROST 1 DROP(S): 0.05 SOLUTION/ DROPS OPHTHALMIC; TOPICAL at 22:16

## 2019-06-17 RX ADMIN — MIDODRINE HYDROCHLORIDE 5 MILLIGRAM(S): 2.5 TABLET ORAL at 13:12

## 2019-06-17 RX ADMIN — REPAGLINIDE 0.5 MILLIGRAM(S): 1 TABLET ORAL at 22:16

## 2019-06-17 RX ADMIN — Medication 2: at 16:53

## 2019-06-17 RX ADMIN — DORZOLAMIDE HYDROCHLORIDE TIMOLOL MALEATE 1 DROP(S): 20; 5 SOLUTION/ DROPS OPHTHALMIC at 17:34

## 2019-06-17 RX ADMIN — BICALUTAMIDE 50 MILLIGRAM(S): 50 TABLET, FILM COATED ORAL at 11:10

## 2019-06-17 NOTE — PROGRESS NOTE ADULT - SUBJECTIVE AND OBJECTIVE BOX
Patient seen and examined at bedside in no distress.  S/p steen catheter reinsertion at 7PM due to UR.  No complaints offered.    T(F): 98.6 (06-17-19 @ 05:45), Max: 98.6 (06-17-19 @ 05:45)  HR: 79 (06-17-19 @ 05:45) (73 - 81)  BP: 94/46 (06-17-19 @ 05:45) (94/46 - 144/62)  RR: 18 (06-17-19 @ 05:45) (16 - 18)  SpO2: 97% (06-17-19 @ 05:45) (97% - 100%)      PHYSICAL EXAM:  General: Awake, alert, NAD  CV: +S1S2 regular rate and rhythm  Lung: Respirations nonlabored  Abdomen: Soft, NTND  Extremities: No pedal edema or calf tenderness noted   : Steen catheter in place draining dark yellow urine    LABS:                        8.7    7.29  )-----------( 182      ( 17 Jun 2019 06:28 )             27.5     06-17    141  |  109<H>  |  24<H>  ----------------------------<  107<H>  4.1   |  21<L>  |  0.88    Ca    8.2<L>      17 Jun 2019 06:28      Impression: 89M admitted with 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 Lou

## 2019-06-17 NOTE — PROGRESS NOTE ADULT - SUBJECTIVE AND OBJECTIVE BOX
INTERVAL HPI/OVERNIGHT EVENTS:        REVIEW OF SYSTEMS:  CONSTITUTIONAL:  no  complaints    NECK: No pain or stiffnes  RESPIRATORY: No SOB   CARDIOVASCULAR: No chest pain, palpitations, dizziness,   GASTROINTESTINAL: No abdominal pain. No nausea, vomiting,   NEUROLOGICAL: No headaches, no  blurry  vision no  dizziness  SKIN: No itching,   MUSCULOSKELETAL: No pain    MEDICATION:  bicalutamide 50 milliGRAM(s) Oral daily  cefTRIAXone   IVPB 1 Gram(s) IV Intermittent every 24 hours  dextrose 40% Gel 15 Gram(s) Oral once PRN  dextrose 50% Injectable 12.5 Gram(s) IV Push once  dextrose 50% Injectable 25 Gram(s) IV Push once  dextrose 50% Injectable 25 Gram(s) IV Push once  dorzolamide 2%/timolol 0.5% Ophthalmic Solution 1 Drop(s) Both EYES two times a day  ferrous    sulfate 325 milliGRAM(s) Oral daily  glucagon  Injectable 1 milliGRAM(s) IntraMuscular once PRN  insulin lispro (HumaLOG) corrective regimen sliding scale   SubCutaneous three times a day before meals  latanoprost 0.005% Ophthalmic Solution 1 Drop(s) Both EYES at bedtime  midodrine 5 milliGRAM(s) Oral three times a day  pantoprazole    Tablet 40 milliGRAM(s) Oral before breakfast  repaglinide 0.5 milliGRAM(s) Oral three times a day  sodium chloride 0.9%. 1000 milliLiter(s) IV Continuous <Continuous>    Vital Signs Last 24 Hrs  T(C): 37 (17 Jun 2019 05:45), Max: 37 (17 Jun 2019 05:45)  T(F): 98.6 (17 Jun 2019 05:45), Max: 98.6 (17 Jun 2019 05:45)  HR: 79 (17 Jun 2019 05:45) (73 - 81)  BP: 94/46 (17 Jun 2019 05:45) (94/46 - 144/62)  BP(mean): --  RR: 18 (17 Jun 2019 05:45) (16 - 18)  SpO2: 97% (17 Jun 2019 05:45) (97% - 100%)    PHYSICAL EXAM:  GENERAL: NAD, well-groomed, well-developed  EYES:  conjunctiva and sclera clear  ENMT:  Moist mucous membranes,   NECK: Supple, No JVD, Normal thyroid  NERVOUS SYSTEM:  Alert oriented  x 2 no  focal  deficits;   CHEST/LUNG: Clear    HEART: Regular rate and rhythm; No murmurs, rubs, or gallops  ABDOMEN: Soft, Nontender, Nondistended; Bowel sounds present  EXTREMITIES:  no  edema no  tenderness  SKIN: No rashes   LABS:                        8.7    7.29  )-----------( 182      ( 17 Jun 2019 06:28 )             27.5     06-17    141  |  109<H>  |  24<H>  ----------------------------<  107<H>  4.1   |  21<L>  |  0.88    Ca    8.2<L>      17 Jun 2019 06:28          CAPILLARY BLOOD GLUCOSE      POCT Blood Glucose.: 87 mg/dL (17 Jun 2019 07:23)  POCT Blood Glucose.: 89 mg/dL (16 Jun 2019 17:00)  POCT Blood Glucose.: 230 mg/dL (16 Jun 2019 11:25)      RADIOLOGY & ADDITIONAL TESTS:    Imaging reports  Personally Reviewed:  [x ] YES  [ ] NO    Consultant(s) Notes Reviewed:  [x ] YES  [ ] NO    Care Discussed with Consultants/Other Providers [ x] YES  [ ] NO  Assessment and Plan:   Problem/Plan - 1:  ·  Problem: Symptomatic anemia. gross  hematuria  Plan: IVF  monitor  labs  results  of  cystsocopy  discussed  with  urology for Rt  nephrostomy  by  IR     Problem/Plan - 2:  ·  Problem: Hematuria.  Plan: as  above     Problem/Plan - 3:  ·  Problem: Diabetes.  Plan: fs  monitor.     Problem/Plan - 4:  ·  Problem: Prostate cancer.  Plan: observation. on  casodex    Problem/Plan - 5:  ·  Problem: CKD (chronic kidney disease).  Plan: hydration.     Problem/Plan - 6:  Problem: HTN (hypertension). Plan: observation  off  meds patient  hypotensive.  urinary  retention  steen  cathather   medically  cleared  for  procedure

## 2019-06-18 ENCOUNTER — TRANSCRIPTION ENCOUNTER (OUTPATIENT)
Age: 84
End: 2019-06-18

## 2019-06-18 VITALS
SYSTOLIC BLOOD PRESSURE: 148 MMHG | DIASTOLIC BLOOD PRESSURE: 99 MMHG | RESPIRATION RATE: 19 BRPM | OXYGEN SATURATION: 98 % | HEART RATE: 91 BPM | TEMPERATURE: 96 F

## 2019-06-18 LAB
ANION GAP SERPL CALC-SCNC: 9 MMOL/L — SIGNIFICANT CHANGE UP (ref 5–17)
BUN SERPL-MCNC: 32 MG/DL — HIGH (ref 7–23)
CALCIUM SERPL-MCNC: 7.9 MG/DL — LOW (ref 8.5–10.1)
CHLORIDE SERPL-SCNC: 110 MMOL/L — HIGH (ref 96–108)
CO2 SERPL-SCNC: 22 MMOL/L — SIGNIFICANT CHANGE UP (ref 22–31)
CREAT SERPL-MCNC: 0.96 MG/DL — SIGNIFICANT CHANGE UP (ref 0.5–1.3)
GLUCOSE BLDC GLUCOMTR-MCNC: 125 MG/DL — HIGH (ref 70–99)
GLUCOSE BLDC GLUCOMTR-MCNC: 175 MG/DL — HIGH (ref 70–99)
GLUCOSE BLDC GLUCOMTR-MCNC: 200 MG/DL — HIGH (ref 70–99)
GLUCOSE BLDC GLUCOMTR-MCNC: 277 MG/DL — HIGH (ref 70–99)
GLUCOSE SERPL-MCNC: 110 MG/DL — HIGH (ref 70–99)
HCT VFR BLD CALC: 30.1 % — LOW (ref 39–50)
HGB BLD-MCNC: 9.4 G/DL — LOW (ref 13–17)
MCHC RBC-ENTMCNC: 27.9 PG — SIGNIFICANT CHANGE UP (ref 27–34)
MCHC RBC-ENTMCNC: 31.2 GM/DL — LOW (ref 32–36)
MCV RBC AUTO: 89.3 FL — SIGNIFICANT CHANGE UP (ref 80–100)
NRBC # BLD: 0 /100 WBCS — SIGNIFICANT CHANGE UP (ref 0–0)
PLATELET # BLD AUTO: 195 K/UL — SIGNIFICANT CHANGE UP (ref 150–400)
POTASSIUM SERPL-MCNC: 4.2 MMOL/L — SIGNIFICANT CHANGE UP (ref 3.5–5.3)
POTASSIUM SERPL-SCNC: 4.2 MMOL/L — SIGNIFICANT CHANGE UP (ref 3.5–5.3)
RBC # BLD: 3.37 M/UL — LOW (ref 4.2–5.8)
RBC # FLD: 15.8 % — HIGH (ref 10.3–14.5)
SODIUM SERPL-SCNC: 141 MMOL/L — SIGNIFICANT CHANGE UP (ref 135–145)
WBC # BLD: 7.73 K/UL — SIGNIFICANT CHANGE UP (ref 3.8–10.5)
WBC # FLD AUTO: 7.73 K/UL — SIGNIFICANT CHANGE UP (ref 3.8–10.5)

## 2019-06-18 RX ORDER — TAMSULOSIN HYDROCHLORIDE 0.4 MG/1
1 CAPSULE ORAL
Qty: 0 | Refills: 0 | DISCHARGE
Start: 2019-06-18

## 2019-06-18 RX ORDER — TAMSULOSIN HYDROCHLORIDE 0.4 MG/1
0.4 CAPSULE ORAL AT BEDTIME
Refills: 0 | Status: DISCONTINUED | OUTPATIENT
Start: 2019-06-18 | End: 2019-06-18

## 2019-06-18 RX ADMIN — Medication 325 MILLIGRAM(S): at 11:25

## 2019-06-18 RX ADMIN — DORZOLAMIDE HYDROCHLORIDE TIMOLOL MALEATE 1 DROP(S): 20; 5 SOLUTION/ DROPS OPHTHALMIC at 17:10

## 2019-06-18 RX ADMIN — BICALUTAMIDE 50 MILLIGRAM(S): 50 TABLET, FILM COATED ORAL at 11:25

## 2019-06-18 RX ADMIN — DORZOLAMIDE HYDROCHLORIDE TIMOLOL MALEATE 1 DROP(S): 20; 5 SOLUTION/ DROPS OPHTHALMIC at 06:07

## 2019-06-18 RX ADMIN — PANTOPRAZOLE SODIUM 40 MILLIGRAM(S): 20 TABLET, DELAYED RELEASE ORAL at 08:19

## 2019-06-18 RX ADMIN — Medication 1: at 17:10

## 2019-06-18 RX ADMIN — REPAGLINIDE 0.5 MILLIGRAM(S): 1 TABLET ORAL at 08:19

## 2019-06-18 RX ADMIN — Medication 3: at 11:24

## 2019-06-18 RX ADMIN — CEFTRIAXONE 100 GRAM(S): 500 INJECTION, POWDER, FOR SOLUTION INTRAMUSCULAR; INTRAVENOUS at 00:10

## 2019-06-18 RX ADMIN — REPAGLINIDE 0.5 MILLIGRAM(S): 1 TABLET ORAL at 14:11

## 2019-06-18 NOTE — PROGRESS NOTE ADULT - PROVIDER SPECIALTY LIST ADULT
Internal Medicine
Surgery
Urology
Internal Medicine
Urology
Internal Medicine
Internal Medicine

## 2019-06-18 NOTE — PROGRESS NOTE ADULT - REASON FOR ADMISSION
symptomatic  anemia hematuria

## 2019-06-18 NOTE — PROGRESS NOTE ADULT - SUBJECTIVE AND OBJECTIVE BOX
Patient seen and examined at bedside in no distress.  No complaints offered.  Voiding without issue. Denies abdominal pain, dysuria, hematuria.  Denies fever, chills, chest pain, sob.    T(F): 98 (06-18-19 @ 06:07), Max: 98.4 (06-17-19 @ 23:36)  HR: 78 (06-18-19 @ 06:07) (66 - 82)  BP: 130/89 (06-18-19 @ 06:07) (101/51 - 148/80)  RR: 18 (06-18-19 @ 06:07) (18 - 18)  SpO2: 100% (06-18-19 @ 06:07) (98% - 100%)    PHYSICAL EXAM:  General: Alert & oriented x 3  CV: +S1S2 regular rate and rhythm  Lung: Respirations nonlabored  Abdomen: Soft, NTND  : No suprapubic tenderness     LABS:                        9.4    7.73  )-----------( 195      ( 18 Jun 2019 06:46 )             30.1     06-18    141  |  110<H>  |  32<H>  ----------------------------<  110<H>  4.2   |  22  |  0.96    Ca    7.9<L>      18 Jun 2019 06:46      Impression: 89M admitted with 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 PVR 120cc  Plan:   - continue antibiotics  - casodex  - continue medical management and supportive care

## 2019-06-18 NOTE — PROGRESS NOTE ADULT - SUBJECTIVE AND OBJECTIVE BOX
INTERVAL HPI/OVERNIGHT EVENTS:    urinating  well  off  steen    REVIEW OF SYSTEMS:  CONSTITUTIONAL: feels  well no  complaints    NECK: No pain or stiffnes  RESPIRATORY: No SOB   CARDIOVASCULAR: No chest pain, palpitations, dizziness,   GASTROINTESTINAL: No abdominal pain. No nausea, vomiting,   NEUROLOGICAL: No headaches, no  blurry  vision no  dizziness  SKIN: No itching,   MUSCULOSKELETAL: No pain    MEDICATION:  bicalutamide 50 milliGRAM(s) Oral daily  cefTRIAXone   IVPB 1 Gram(s) IV Intermittent every 24 hours  dextrose 40% Gel 15 Gram(s) Oral once PRN  dextrose 50% Injectable 12.5 Gram(s) IV Push once  dextrose 50% Injectable 25 Gram(s) IV Push once  dextrose 50% Injectable 25 Gram(s) IV Push once  dorzolamide 2%/timolol 0.5% Ophthalmic Solution 1 Drop(s) Both EYES two times a day  ferrous    sulfate 325 milliGRAM(s) Oral daily  glucagon  Injectable 1 milliGRAM(s) IntraMuscular once PRN  insulin lispro (HumaLOG) corrective regimen sliding scale   SubCutaneous three times a day before meals  latanoprost 0.005% Ophthalmic Solution 1 Drop(s) Both EYES at bedtime  midodrine 5 milliGRAM(s) Oral three times a day  pantoprazole    Tablet 40 milliGRAM(s) Oral before breakfast  repaglinide 0.5 milliGRAM(s) Oral three times a day  sodium chloride 0.9%. 1000 milliLiter(s) IV Continuous <Continuous>    Vital Signs Last 24 Hrs  T(C): 36.7 (18 Jun 2019 06:07), Max: 36.9 (17 Jun 2019 23:36)  T(F): 98 (18 Jun 2019 06:07), Max: 98.4 (17 Jun 2019 23:36)  HR: 78 (18 Jun 2019 06:07) (66 - 82)  BP: 130/89 (18 Jun 2019 06:07) (101/51 - 148/80)  BP(mean): --  RR: 18 (18 Jun 2019 06:07) (18 - 18)  SpO2: 100% (18 Jun 2019 06:07) (98% - 100%)    PHYSICAL EXAM:  GENERAL: NAD, well-groomed, well-developed  EYES:  conjunctiva and sclera clear   NECK: Supple, No JVD, Normal thyroid  NERVOUS SYSTEM:  Alert oriented x1  no  focal  deficits;   CHEST/LUNG: Clear    HEART: Regular rate and rhythm; No murmurs, rubs, or gallops  ABDOMEN: Soft, Nontender, Nondistended; Bowel sounds present  EXTREMITIES:  no  edema no  tenderness  SKIN: No rashes   LABS:                        9.4    7.73  )-----------( 195      ( 18 Jun 2019 06:46 )             30.1     06-18    141  |  110<H>  |  32<H>  ----------------------------<  110<H>  4.2   |  22  |  0.96    Ca    7.9<L>      18 Jun 2019 06:46          CAPILLARY BLOOD GLUCOSE      POCT Blood Glucose.: 125 mg/dL (18 Jun 2019 08:16)  POCT Blood Glucose.: 185 mg/dL (17 Jun 2019 21:13)  POCT Blood Glucose.: 209 mg/dL (17 Jun 2019 16:49)  POCT Blood Glucose.: 95 mg/dL (17 Jun 2019 11:13)      RADIOLOGY & ADDITIONAL TESTS:    Imaging reports  Personally Reviewed:  [x ] YES  [ ] NO    Consultant(s) Notes Reviewed:  [x ] YES  [ ] NO    Care Discussed with Consultants/Other Providers [ x] YES  [ ] NO  Problem/Plan - 1:  ·  Problem: Symptomatic anemia. gross  hematuria  Plan: IVF  monitor  labs  results  of  cystsocopy  discussed  with  urology  will  hold  off  on  nephrostomy  as  renal  function  stable no  further  hematuria  Problem/Plan - 2:  ·  Problem: Hematuria.  Plan: as  above     Problem/Plan - 3:  ·  Problem: Diabetes.  Plan: fs  monitor.     Problem/Plan - 4:  ·  Problem: Prostate cancer.  Plan: observation. on  casodex    Problem/Plan - 5:  ·  Problem: CKD (chronic kidney disease).  Plan: hydration.     Problem/Plan - 6:  Problem: HTN (hypertension). Plan: observation  off  meds patient  hypotensive.  urinary  retention  observe  off  steen  cathater

## 2019-06-18 NOTE — PROGRESS NOTE ADULT - ATTENDING COMMENTS
no significant residual urine. 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 Started on Casodex    Hematuria:  most likely secondary to infection and urinary retention. resolved.    Chronic Right Hydronephrosis:  Secondary to recurrent CA prostate obstructing lower ureter. Stable renal function, no pain. No intervention    S/p treatment of urethral stricture with dilatation and TURB/P    patient is stable for discharge. Discussed with Dr Pichardo.

## 2019-06-18 NOTE — DISCHARGE NOTE NURSING/CASE MANAGEMENT/SOCIAL WORK - NSDCDPATPORTLINK_GEN_ALL_CORE
You can access the [facility], offered by [facility], by registering with the following website: [email or web address]

## 2019-06-20 LAB
CULTURE RESULTS: SIGNIFICANT CHANGE UP
CULTURE RESULTS: SIGNIFICANT CHANGE UP
SPECIMEN SOURCE: SIGNIFICANT CHANGE UP
SPECIMEN SOURCE: SIGNIFICANT CHANGE UP

## 2019-06-23 DIAGNOSIS — Z79.899 OTHER LONG TERM (CURRENT) DRUG THERAPY: ICD-10-CM

## 2019-06-23 DIAGNOSIS — C77.9 SECONDARY AND UNSPECIFIED MALIGNANT NEOPLASM OF LYMPH NODE, UNSPECIFIED: ICD-10-CM

## 2019-06-23 DIAGNOSIS — R68.0 HYPOTHERMIA, NOT ASSOCIATED WITH LOW ENVIRONMENTAL TEMPERATURE: ICD-10-CM

## 2019-06-23 DIAGNOSIS — C79.51 SECONDARY MALIGNANT NEOPLASM OF BONE: ICD-10-CM

## 2019-06-23 DIAGNOSIS — Z79.82 LONG TERM (CURRENT) USE OF ASPIRIN: ICD-10-CM

## 2019-06-23 DIAGNOSIS — N13.39 OTHER HYDRONEPHROSIS: ICD-10-CM

## 2019-06-23 DIAGNOSIS — Z79.01 LONG TERM (CURRENT) USE OF ANTICOAGULANTS: ICD-10-CM

## 2019-06-23 DIAGNOSIS — Z79.84 LONG TERM (CURRENT) USE OF ORAL HYPOGLYCEMIC DRUGS: ICD-10-CM

## 2019-06-23 DIAGNOSIS — R31.0 GROSS HEMATURIA: ICD-10-CM

## 2019-06-23 DIAGNOSIS — D64.9 ANEMIA, UNSPECIFIED: ICD-10-CM

## 2019-06-23 DIAGNOSIS — R33.9 RETENTION OF URINE, UNSPECIFIED: ICD-10-CM

## 2019-06-23 DIAGNOSIS — H40.9 UNSPECIFIED GLAUCOMA: ICD-10-CM

## 2019-06-23 DIAGNOSIS — I95.9 HYPOTENSION, UNSPECIFIED: ICD-10-CM

## 2019-06-23 DIAGNOSIS — E43 UNSPECIFIED SEVERE PROTEIN-CALORIE MALNUTRITION: ICD-10-CM

## 2019-06-23 DIAGNOSIS — Z92.3 PERSONAL HISTORY OF IRRADIATION: ICD-10-CM

## 2019-06-23 DIAGNOSIS — E78.5 HYPERLIPIDEMIA, UNSPECIFIED: ICD-10-CM

## 2019-06-23 DIAGNOSIS — Z90.79 ACQUIRED ABSENCE OF OTHER GENITAL ORGAN(S): ICD-10-CM

## 2019-06-23 DIAGNOSIS — E11.22 TYPE 2 DIABETES MELLITUS WITH DIABETIC CHRONIC KIDNEY DISEASE: ICD-10-CM

## 2019-06-23 DIAGNOSIS — Z86.73 PERSONAL HISTORY OF TRANSIENT ISCHEMIC ATTACK (TIA), AND CEREBRAL INFARCTION WITHOUT RESIDUAL DEFICITS: ICD-10-CM

## 2019-06-23 DIAGNOSIS — C61 MALIGNANT NEOPLASM OF PROSTATE: ICD-10-CM

## 2019-06-23 DIAGNOSIS — I12.9 HYPERTENSIVE CHRONIC KIDNEY DISEASE WITH STAGE 1 THROUGH STAGE 4 CHRONIC KIDNEY DISEASE, OR UNSPECIFIED CHRONIC KIDNEY DISEASE: ICD-10-CM

## 2019-06-24 LAB — SURGICAL PATHOLOGY STUDY: SIGNIFICANT CHANGE UP

## 2019-11-16 NOTE — ED ADULT NURSE NOTE - CADM POA PRESS ULCER
"Chief Complaint   Patient presents with   • Flank Pain     Pt reports his heart started racing last night and then noticed that both his kidneys were \"swollen.\" Intermittent episodes of \"cramping\" in his kidneys.      /72   Pulse 88   Temp 36.4 °C (97.5 °F) (Temporal)   Resp 15   Ht 1.778 m (5' 10\")   Wt 100 kg (220 lb 7.4 oz)   SpO2 99%   BMI 31.63 kg/m²     Pt ambulated into triage, VS as above, NAD, encouraged to return to the triage nurse or tech with any new complaints or symptoms. Urine cup provided.  "
No

## 2019-12-26 NOTE — ED ADULT NURSE NOTE - PAIN: BODY LOCATION
right leg Surgeon/Pathologist Verbiage (Will Incorporate Name Of Surgeon From Intro If Not Blank): operated in two distinct and integrated capacities as the surgeon and pathologist.

## 2020-05-26 NOTE — DISCHARGE NOTE ADULT - NSCORESITESY/N_GEN_A_CORE_RD
Last INR 5/24/20 was 7.2.  Dose held.  Today's INR is 4.3 and is above goal range.    Discussed with JOANIE Medina with [facility]. Patient will continue to hold warfarin today and tomorrow. Recheck INR on Thursday 5/28 with home care RN visit. Updated [facility]. [facility] lab outreach has been updated to keep standing order on file and that [facility] will update when lab is needed.    Dr. Miller is in the office today supervising the treatment.    Call physician or seek medical care immediately if you notice any of the following symptoms of a bleed:   · Red, dark, coffee or cola colored urine  · Red or tar like stools  · Excessive bleeding from gums or nose  · Vomiting coffee colored or bright red material  · Coughing up red tinged sputum  · Severe or unprovoked pain (ex: severe Headache or Abdominal pain)  · Sudden, spontaneous bruising for no reason  · Excessive menstrual bleeding  · A cut that will not stop bleeding within 10-15 mins  · Symptoms associated with abnormal bleeding/high INR reviewed.    Encouraged to avoid activities that may result in a serious fall or injury.    Facility was instructed to contact the clinic with any unusual bleeding or bruising, any changes in medications, diet, health status, lifestyle, or any other changes, questions or concerns.    Yes

## 2020-06-30 NOTE — PROGRESS NOTE ADULT - PROBLEM SELECTOR PLAN 1
Health Care Proxy (HCP) - patient presents with FS initially of 94, dropping to 40s three times throughout the course of the night  - unknown how much levemir patient has taken at home; med list requested from PCP's office as [facility] reports they do not fill his meds (though listed in outpt med records)  - patient at high risk of hypoglycemia given he takes sulfonylurea at home and has dementia, making it unsafe to administer insulin   - will need to change diabetic regimen  - Patient now hyperglycemic to the 300's after being off insulin, will start sliding scale coverage and diabetic diet  - fall/seizure/aspiration precautions  - endocrine consult appreciated, added prandin and changed from moderate to low dose sliding scale

## 2021-05-15 NOTE — PHYSICAL THERAPY INITIAL EVALUATION ADULT - LIVES WITH, PROFILE
The patient is a 50y Male complaining of see chief complaint quote.
at home, steps to manage , HHA services/alone

## 2021-09-28 NOTE — H&P ADULT - NSCORESITESY/N_GEN_A_CORE_RD
Pt called for medication refill on medication listed below. Pt can be reached at [phone] when ready.   Yes

## 2021-12-07 NOTE — DISCHARGE NOTE ADULT - NS AS DC FU INST LIST INST
Detail Level: Simple Depth Of Biopsy: dermis Was A Bandage Applied: Yes Size Of Lesion In Cm: 0.3 X Size Of Lesion In Cm: 0 Biopsy Type: H and E Biopsy Method: 15 blade Anesthesia Type: 1% Xylocaine without epinephrine Anesthesia Volume In Cc (Will Not Render If 0): 0.5 Hemostasis: Aluminum Chloride Wound Care: Vaseline Dressing: bandage Destruction After The Procedure: No Type Of Destruction Used: Curettage Curettage Text: The wound bed was treated with curettage after the biopsy was performed. Cryotherapy Text: The wound bed was treated with cryotherapy after the biopsy was performed. Electrodesiccation Text: The wound bed was treated with electrodesiccation after the biopsy was performed. Electrodesiccation And Curettage Text: The wound bed was treated with electrodesiccation and curettage after the biopsy was performed. Silver Nitrate Text: The wound bed was treated with silver nitrate after the biopsy was performed. Lab: [facility] Consent: The provider?s intent is to obtain a tissue sample solely for diagnostic purposes. Written consent was obtained and risks were reviewed including but not limited to scarring, infection, bleeding, scabbing, incomplete removal, nerve damage and allergy to anesthesia. Post-Care Instructions: I reviewed with the patient in detail post-care instructions. Patient is to keep the biopsy site dry overnight, and then apply bacitracin twice daily until healed. Patient may apply hydrogen peroxide soaks to remove any crusting. Notification Instructions: Patient will be notified of biopsy results. However, patient instructed to call the office if not contacted within 2 weeks. Billing Type: [employer] Information: Selecting Yes will display possible errors in your note based on the variables you have selected. This validation is only offered as a suggestion for you. PLEASE NOTE THAT THE VALIDATION TEXT WILL BE REMOVED WHEN YOU FINALIZE YOUR NOTE. IF YOU WANT TO FAX A PRELIMINARY NOTE YOU WILL NEED TO TOGGLE THIS TO 'NO' IF YOU DO NOT WANT IT IN YOUR FAXED NOTE. no

## 2022-02-27 NOTE — ED PROVIDER NOTE - TEMPLATE, MLM
Vascular Surgery Consult Note  2/27/2022    Subjective:     Nicolas Rodgers is a 80 y.o.  male admitted with abdominal pain and underwent right hemicolectomy  From which he is recovering. Well known to our practice. Hx of open AAA repair (tube graft, Kevin, 2012) and R CEA Joseph Soriano, 2020). Worsening right second toe infection w/ osteo. Complaining of pain everywhere. Past Medical History:   Diagnosis Date    AAA (abdominal aortic aneurysm) ([address] 1/6/2012    Abnormal serum protein electrophoresis 4/25/2012    Anemia 4/6/2010    Anemia 1/5/2012    Anemia due to GI bleed 2007 4/6/2010    Aneurysm ([address]     AAA    Arrhythmia     ATRIAL FIB PT STATES HE DOESN'T HAVE    Arthritis     Atrial fibrillation 1/2012 2/22/2012    Bilateral Carotid Bruits, L>R 9/7/2010    Bone cancer ([address]     Borderline diabetes mellitus 4/6/2010    Cancer ([address]     SKIN MELANOMA ON BACK     Carotid stenosis 4/6/2010    Carotid stenosis     Chronic pain     Diabetes mellitus, type 2 ([address] 9/7/2010    NO MEDS    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    ETOH abuse 4/6/2010    GERD (gastroesophageal reflux disease) 4/6/2010    GI bleed 4/6/2010    GI bleed, duodenitis 2007 4/6/2010    H/O Heavy Alcohol Use 9/7/2010    History of skin cancer 9/7/2010    HTN (hypertension) 4/6/2010    Hypercalcemia 4/25/2012    Hyperhomocysteinemia ([address] 9/7/2010    Hypertriglyceridemia 9/7/2010    Left inguinal hernia 9/14/2015    Lipids blood increased 4/6/2010    Microalbuminuria 12/10/2015    Mild Allergic Rhinitis 9/7/2010    Pre-diabetes 9/7/2010    2005;  Optho Dr Benigno Solares Right inguinal hernia 11/2/2015    S/P AAA repair 2/9/2012 2/22/2012    Sinus tachycardia 9/7/2010    Tachycardia 4/6/2010    Vitamin B12 deficiency 9/7/2010      Past Surgical History:   Procedure Laterality Date    COLONOSCOPY  8/2007    Normal; Dr Norton Cutter EGD  8/2007    small hiatal hernia, mild duodenitis; Dr Esvin Carlin HX AAA REPAIR  2012    Dr Kevan Montes    HX CATARACT REMOVAL      both eyes    HX CHOLECYSTECTOMY  14    [facility]- Dr. Ernestine Su    HX HERNIA REPAIR Left 9/24/15    left indirect inguinal by Dr. Niyah Pickard Right 11/2/15    inguinal w/ mesh by Dr. Niyah Pickard Bilateral 10/2015 And 2015    Inguinal    BLUE DOPPLER  3/26/2012    BLUE Echo; + clot     Family History   Problem Relation Age of Onset    Heart Disease Mother          age 80    Cancer Father          brain tumor age 80    Heart Disease Father     Substance Abuse Daughter          cocaine OD age 43 in 200    Other Son          PE/DVT age 43    Heart Disease Brother    Danis [address] Other Daughter         alive and well    Anesth Problems Neg Hx       Social History     Tobacco Use    Smoking status: Former Smoker     Packs/day: 1.50     Years: 55.00     Pack years: 82.50     Quit date: 2001     Years since quittin.1    Smokeless tobacco: Never Used    Tobacco comment: used to smoke 1 1/2 ppd x ~ 40 yrs   Substance Use Topics    Alcohol use: Yes     Alcohol/week: 14.0 standard drinks     Types: 14 Glasses of wine per week     Comment: 14       Prior to Admission medications    Medication Sig Start Date End Date Taking? Authorizing Provider   docusate sodium (Colace) 100 mg capsule Take 1 Capsule by mouth daily for 20 days. Take daily while taking Norco. 2/17/22 3/9/22  Prince Lorri MD   furosemide (LASIX) 20 mg tablet Take 1 Tablet by mouth two (2) times a day. Hold for low blood pressure 22   Bruce Cobos MD   mupirocin [facility]) 2 % ointment Apply to open wound on toe daily as instructed 22   Bruce Cobos MD   ferrous sulfate 325 mg (65 mg iron) tablet Take 1 Tablet by mouth Daily (before breakfast).  Indications: anemia from inadequate iron 2/16/22   Monroe Nugent MD   calcium carbonate (TUMS) 200 mg calcium (500 mg) chew Take 1 Tablet by mouth as needed. Other, MD Makayla   potassium chloride (KLOR-CON M10) 10 mEq tablet TAKE 1 TABLET BY MOUTH DAILY WITH LASIX 11/29/21   Holsinger, Sherrin Collet, NP   folic acid-vit W2-BYX M85 (Folbic) 2.5-25-2 mg tablet Take 1 Tablet by mouth daily. 6/1/21   Holsinger, Sherrin Collet, NP   pregabalin (LYRICA) 50 mg capsule Take 50 mg by mouth two (2) times a day. 9/16/20   Provider, Historical   aspirin delayed-release 81 mg tablet Take 81 mg by mouth nightly. Provider, Historical   hydroxypropyl methylcellulose (GENTEAL MILD) 0.2 % ophthalmic solution Administer 2 Drops to both eyes as needed. Provider, Historical   loperamide (IMODIUM A-D) 2 mg tablet Take 4 mg by mouth three (3) times daily as needed. 3/28/19   mEmett Cooney MD   acyclovir (ZOVIRAX) 400 mg tablet TK 1 T PO QD 8/6/18   Provider, Historical   multivitamin (ONE A DAY) tablet Take 1 Tab by mouth daily. Provider, Historical     Allergies   Allergen Reactions    Codeine Nausea and Vomiting     Blurred vision, felt faint    Contrast Agent [Iodine] Hives     Needs premedication w/ Benadryl prn (since 1/2012 reaction)    Dexamethasone Other (comments)     \"Deathly ill\"    Fish Oil Nausea Only    Glipizide Other (comments)     DRASTIC DROP IN BLOOD GLUCOSE, fasting    Lovenox [Enoxaparin] Hives     Patient states blisters all over the body.  Metformin Diarrhea    Niacin Other (comments)     Flushing    Norvasc [Amlodipine] Other (comments)     Leg edema    Optiray 350 [Ioversol] Hives     He does not know what this is        Review of Systems   Constitutional: Negative for chills and fever. HENT: Negative for congestion. Respiratory: Negative for shortness of breath. Cardiovascular: Negative for chest pain. Gastrointestinal: Positive for abdominal pain. Endocrine: Negative for cold intolerance.    Genitourinary: Negative for difficulty urinating. Musculoskeletal: Negative for back pain. Skin: Positive for color change and wound. Neurological: Negative for dizziness and headaches. Hematological: Does not bruise/bleed easily. Psychiatric/Behavioral: Negative for agitation. Objective:     Patient Vitals for the past 24 hrs:   BP Temp Pulse Resp SpO2 Weight   02/27/22 1103 (!) 91/48 100.4 °F (38 °C) 94 20 93 % --   02/27/22 1007 -- -- -- -- -- 76.6 kg (168 lb 14 oz)   02/27/22 0724 117/62 98.1 °F (36.7 °C) 100 16 94 % --   02/26/22 2342 132/68 98.2 °F (36.8 °C) 75 16 95 % --   02/26/22 1851 130/60 98 °F (36.7 °C) 78 16 94 % --   02/26/22 1519 106/60 98.4 °F (36.9 °C) 76 16 93 % --        Physical Exam  Constitutional:       Appearance: Normal appearance. HENT:      Head: Normocephalic. Nose: Nose normal.      Mouth/Throat:      Mouth: Mucous membranes are moist.   Eyes:      Extraocular Movements: Extraocular movements intact. Pupils: Pupils are equal, round, and reactive to light. Cardiovascular:      Rate and Rhythm: Normal rate. Pulmonary:      Effort: Pulmonary effort is normal.   Abdominal:      Palpations: Abdomen is soft. Musculoskeletal:         General: Normal range of motion. Cervical back: Normal range of motion. Skin:     General: Skin is warm. Capillary Refill: Capillary refill takes less than 2 seconds. Neurological:      General: No focal deficit present. Mental Status: He is alert. Psychiatric:         Mood and Affect: Mood normal.     Right second toe wound with gangrene/cellulitis.      Pertinent Test Results:   Recent Results (from the past 24 hour(s))   GLUCOSE, POC    Collection Time: 02/26/22  4:17 PM   Result Value Ref Range    Glucose (POC) 107 65 - 117 mg/dL    Performed by Gary De Oliveira  PCT    VANCOMYCIN, RANDOM    Collection Time: 02/27/22  2:48 AM   Result Value Ref Range    Vancomycin, random 16.7 UG/ML   CBC W/O DIFF    Collection Time: 02/27/22  2:48 AM Result Value Ref Range    WBC 8.7 4.1 - 11.1 K/uL    RBC 3.13 (L) 4.10 - 5.70 M/uL    HGB 9.7 (L) 12.1 - 17.0 g/dL    HCT 32.7 (L) 36.6 - 50.3 %    .5 (H) 80.0 - 99.0 FL    MCH 31.0 26.0 - 34.0 PG    MCHC 29.7 (L) 30.0 - 36.5 g/dL    RDW 16.6 (H) 11.5 - 14.5 %    PLATELET 056 026 - 933 K/uL    MPV 11.0 8.9 - 12.9 FL    NRBC 0.0 0  WBC    ABSOLUTE NRBC 0.00 0.00 - 9.69 K/uL   METABOLIC PANEL, BASIC    Collection Time: 02/27/22  2:48 AM   Result Value Ref Range    Sodium 139 136 - 145 mmol/L    Potassium 3.3 (L) 3.5 - 5.1 mmol/L    Chloride 111 (H) 97 - 108 mmol/L    CO2 25 21 - 32 mmol/L    Anion gap 3 (L) 5 - 15 mmol/L    Glucose 112 (H) 65 - 100 mg/dL    BUN 11 6 - 20 MG/DL    Creatinine 0.94 0.70 - 1.30 MG/DL    BUN/Creatinine ratio 12 12 - 20      GFR est AA >60 >60 ml/min/1.73m2    GFR est non-AA >60 >60 ml/min/1.73m2    Calcium 8.4 (L) 8.5 - 10.1 MG/DL   MAGNESIUM    Collection Time: 02/27/22  2:48 AM   Result Value Ref Range    Magnesium 1.6 1.6 - 2.4 mg/dL   PHOSPHORUS    Collection Time: 02/27/22  2:48 AM   Result Value Ref Range    Phosphorus 1.7 (L) 2.6 - 4.7 MG/DL   GLUCOSE, POC    Collection Time: 02/27/22 11:18 AM   Result Value Ref Range    Glucose (POC) 127 (H) 65 - 117 mg/dL    Performed by De Aid  PCT        Assessmen/Plan:   Right leg critical limb threatening ischemia  R MARYURI 0.39  Will need RLE arteriogram  Dr. Opal Bolanos to follow up tomorrow to determine timing, will keep NPO p MN in case there is OR time available tomorrow     Signed By: Karma Augustine MD     February 27, 2022 General

## 2022-03-15 NOTE — PROGRESS NOTE ADULT - SUBJECTIVE AND OBJECTIVE BOX
INTERVAL HPI/OVERNIGHT EVENTS:        REVIEW OF SYSTEMS:  CONSTITUTIONAL: feels well no  complaints    NECK: No pain or stiffnes  RESPIRATORY: No SOB   CARDIOVASCULAR: No chest pain, palpitations, dizziness,   GASTROINTESTINAL: No abdominal pain. No nausea, vomiting,   NEUROLOGICAL: No headaches, no  blurry  vision no  dizziness  SKIN: No itching,   MUSCULOSKELETAL: No pain    MEDICATION:  bicalutamide 50 milliGRAM(s) Oral daily  dextrose 40% Gel 15 Gram(s) Oral once PRN  dextrose 5%. 1000 milliLiter(s) IV Continuous <Continuous>  dextrose 50% Injectable 12.5 Gram(s) IV Push once  dextrose 50% Injectable 25 Gram(s) IV Push once  dextrose 50% Injectable 25 Gram(s) IV Push once  dorzolamide 2%/timolol 0.5% Ophthalmic Solution 1 Drop(s) Both EYES two times a day  ferrous    sulfate 325 milliGRAM(s) Oral daily  glucagon  Injectable 1 milliGRAM(s) IntraMuscular once PRN  insulin lispro (HumaLOG) corrective regimen sliding scale   SubCutaneous three times a day before meals  latanoprost 0.005% Ophthalmic Solution 1 Drop(s) Both EYES at bedtime  midodrine 5 milliGRAM(s) Oral three times a day  pantoprazole    Tablet 40 milliGRAM(s) Oral before breakfast  repaglinide 0.5 milliGRAM(s) Oral three times a day  sodium chloride 0.9%. 1000 milliLiter(s) IV Continuous <Continuous>  sodium chloride 0.9%. 1000 milliLiter(s) IV Continuous <Continuous>    Vital Signs Last 24 Hrs  T(C): 36.3 (14 Jun 2019 05:18), Max: 36.7 (13 Jun 2019 17:55)  T(F): 97.3 (14 Jun 2019 05:18), Max: 98.1 (13 Jun 2019 17:55)  HR: 71 (14 Jun 2019 05:18) (63 - 94)  BP: 124/57 (14 Jun 2019 05:18) (105/56 - 124/57)  BP(mean): --  RR: 18 (14 Jun 2019 05:18) (18 - 19)  SpO2: 98% (14 Jun 2019 05:18) (98% - 100%)    PHYSICAL EXAM:  GENERAL: NAD, well-groomed, well-developed  EYES:  conjunctiva and sclera clear  ENMT:  Moist mucous membranes,   NECK: Supple, No JVD, Normal thyroid  NERVOUS SYSTEM:  Alert oriented x2  no  focal  deficits;   CHEST/LUNG: Clear    HEART: Regular rate and rhythm; No murmurs, rubs, or gallops  ABDOMEN: Soft, Nontender, Nondistended; Bowel sounds present  EXTREMITIES:  no  edema no  tenderness  SKIN: No rashes   LABS:                        10.0   6.88  )-----------( 173      ( 13 Jun 2019 06:20 )             31.2     06-13    136  |  106  |  31<H>  ----------------------------<  261<H>  4.0   |  22  |  0.96    Ca    8.4<L>      13 Jun 2019 06:20          CAPILLARY BLOOD GLUCOSE      POCT Blood Glucose.: 79 mg/dL (14 Jun 2019 07:24)  POCT Blood Glucose.: 118 mg/dL (13 Jun 2019 21:30)  POCT Blood Glucose.: 104 mg/dL (13 Jun 2019 16:29)  POCT Blood Glucose.: 202 mg/dL (13 Jun 2019 10:50)      RADIOLOGY & ADDITIONAL TESTS:    Imaging reports  Personally Reviewed:  [x ] YES  [ ] NO    Consultant(s) Notes Reviewed:  [ x] YES  [ ] NO    Care Discussed with Consultants/Other Providers [ x] YES  [ ] NO  Assessment and Plan:   Problem/Plan - 1:  ·  Problem: Symptomatic anemia. gross  hematuria  Plan: IVF  monitor  labs discussed  with  urology  would need  cystoscopy  ureteroscopy  for  further  evaluation  of    bleed  and  rt  hydronephrosis   for  cystoscopy  ureteroscopy     Problem/Plan - 2:  ·  Problem: Hematuria.  Plan: as  above     Problem/Plan - 3:  ·  Problem: Diabetes.  Plan: fs  monitor.     Problem/Plan - 4:  ·  Problem: Prostate cancer.  Plan: observation. on  casodex    Problem/Plan - 5:  ·  Problem: CKD (chronic kidney disease).  Plan: hydration.     Problem/Plan - 6:  Problem: HTN (hypertension). Plan: observation  off  meds patient  hypotensive. INTERVAL HPI/OVERNIGHT EVENTS:        REVIEW OF SYSTEMS:  CONSTITUTIONAL: feels well no  complaints    NECK: No pain or stiffnes  RESPIRATORY: No SOB   CARDIOVASCULAR: No chest pain, palpitations, dizziness,   GASTROINTESTINAL: No abdominal pain. No nausea, vomiting,   NEUROLOGICAL: No headaches, no  blurry  vision no  dizziness  SKIN: No itching,   MUSCULOSKELETAL: No pain    MEDICATION:  bicalutamide 50 milliGRAM(s) Oral daily  dextrose 40% Gel 15 Gram(s) Oral once PRN  dextrose 5%. 1000 milliLiter(s) IV Continuous <Continuous>  dextrose 50% Injectable 12.5 Gram(s) IV Push once  dextrose 50% Injectable 25 Gram(s) IV Push once  dextrose 50% Injectable 25 Gram(s) IV Push once  dorzolamide 2%/timolol 0.5% Ophthalmic Solution 1 Drop(s) Both EYES two times a day  ferrous    sulfate 325 milliGRAM(s) Oral daily  glucagon  Injectable 1 milliGRAM(s) IntraMuscular once PRN  insulin lispro (HumaLOG) corrective regimen sliding scale   SubCutaneous three times a day before meals  latanoprost 0.005% Ophthalmic Solution 1 Drop(s) Both EYES at bedtime  midodrine 5 milliGRAM(s) Oral three times a day  pantoprazole    Tablet 40 milliGRAM(s) Oral before breakfast  repaglinide 0.5 milliGRAM(s) Oral three times a day  sodium chloride 0.9%. 1000 milliLiter(s) IV Continuous <Continuous>  sodium chloride 0.9%. 1000 milliLiter(s) IV Continuous <Continuous>    Vital Signs Last 24 Hrs  T(C): 36.3 (14 Jun 2019 05:18), Max: 36.7 (13 Jun 2019 17:55)  T(F): 97.3 (14 Jun 2019 05:18), Max: 98.1 (13 Jun 2019 17:55)  HR: 71 (14 Jun 2019 05:18) (63 - 94)  BP: 124/57 (14 Jun 2019 05:18) (105/56 - 124/57)  BP(mean): --  RR: 18 (14 Jun 2019 05:18) (18 - 19)  SpO2: 98% (14 Jun 2019 05:18) (98% - 100%)    PHYSICAL EXAM:  GENERAL: NAD, well-groomed, well-developed  EYES:  conjunctiva and sclera clear  ENMT:  Moist mucous membranes,   NECK: Supple, No JVD, Normal thyroid  NERVOUS SYSTEM:  Alert oriented x2  no  focal  deficits;   CHEST/LUNG: Clear    HEART: Regular rate and rhythm; No murmurs, rubs, or gallops  ABDOMEN: Soft, Nontender, Nondistended; Bowel sounds present  EXTREMITIES:  no  edema no  tenderness  SKIN: No rashes   LABS:                        10.0   6.88  )-----------( 173      ( 13 Jun 2019 06:20 )             31.2     06-13    136  |  106  |  31<H>  ----------------------------<  261<H>  4.0   |  22  |  0.96    Ca    8.4<L>      13 Jun 2019 06:20    EKG  NSR  60/MIN NO  T/ST  CHANGES      CAPILLARY BLOOD GLUCOSE      POCT Blood Glucose.: 79 mg/dL (14 Jun 2019 07:24)  POCT Blood Glucose.: 118 mg/dL (13 Jun 2019 21:30)  POCT Blood Glucose.: 104 mg/dL (13 Jun 2019 16:29)  POCT Blood Glucose.: 202 mg/dL (13 Jun 2019 10:50)      RADIOLOGY & ADDITIONAL TESTS:    Imaging reports  Personally Reviewed:  [x ] YES  [ ] NO    Consultant(s) Notes Reviewed:  [ x] YES  [ ] NO    Care Discussed with Consultants/Other Providers [ x] YES  [ ] NO  Assessment and Plan:   Problem/Plan - 1:  ·  Problem: Symptomatic anemia. gross  hematuria  Plan: IVF  monitor  labs discussed  with  urology  would need  cystoscopy  ureteroscopy  for  further  evaluation  of    bleed  and  rt  hydronephrosis   for  cystoscopy  ureteroscopy     Problem/Plan - 2:  ·  Problem: Hematuria.  Plan: as  above     Problem/Plan - 3:  ·  Problem: Diabetes.  Plan: fs  monitor.     Problem/Plan - 4:  ·  Problem: Prostate cancer.  Plan: observation. on  casodex    Problem/Plan - 5:  ·  Problem: CKD (chronic kidney disease).  Plan: hydration.     Problem/Plan - 6:  Problem: HTN (hypertension). Plan: observation  off  meds patient  hypotensive. 15-Mar-2022 16:07

## 2022-04-05 NOTE — ED ADULT NURSE NOTE - NSFALLRSKHMRSKTYOTFT_ED_ALL_ED
INR at goal. Medications and chart reviewed. No changes noted to necessitate adjustment of warfarin or follow-up plan. See calendar.        generalized weakness

## 2022-06-06 NOTE — PATIENT PROFILE ADULT - OVER THE PAST TWO WEEKS, HAVE YOU FELT LITTLE INTEREST OR PLEASURE IN DOING THINGS?
FAMILY HISTORY:  Father  Still living? Unknown  FH: hyperlipidemia, Age at diagnosis: Age Unknown     no

## 2023-01-09 NOTE — ED ADULT NURSE NOTE - NS PRO AD PATIENT TYPE ON CHART
Do Not Resuscitate (DNR)/Medical Orders for Life-Sustaining Treatment (MOLST) Adjacent Tissue Transfer Text: The defect edges were debeveled with a #15 scalpel blade.  Given the location of the defect and the proximity to free margins an adjacent tissue transfer was deemed most appropriate.  Using a sterile surgical marker, an appropriate flap was drawn incorporating the defect and placing the expected incisions within the relaxed skin tension lines where possible.    The area thus outlined was incised deep to adipose tissue with a #15 scalpel blade.  The skin margins were undermined to an appropriate distance in all directions utilizing iris scissors.

## 2023-07-14 NOTE — PATIENT PROFILE ADULT. - FUNCTIONAL LEVEL PRIOR: TRANSFERRING
-Miralax 4 caps today and then resume 1 cap daily   -Pt instructed to call if not improving on Monday, will consider adding Senna  -Referral to GI given sudden change in bowel habits for consideration for colonoscopy      (1) assistive equipment
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
## 2024-04-15 NOTE — PROVIDER CONTACT NOTE (CRITICAL VALUE NOTIFICATION) - PERSON GIVING RESULT:
Elizabeth
Grazyna Powell
What Type Of Note Output Would You Prefer (Optional)?: Bullet Format
Hpi Title: Evaluation of Skin Lesions
How Severe Are Your Spot(S)?: mild
Have Your Spot(S) Been Treated In The Past?: has not been treated

## 2025-04-23 NOTE — ED ADULT NURSE NOTE - NS ED NURSE LEVEL OF CONSCIOUSNESS SPEECH
Patients phone disconnected while making an appointment. Left a VM with patient approved appointment date and time.  Address and phone number to reach office provided.   Speaking Coherently

## 2025-04-25 NOTE — CONSULT NOTE ADULT - SUBJECTIVE AND OBJECTIVE BOX
HPI:  89 y/o male hx dm prostate cancer with mets to bone, HLD, c/o pain to right hip/knee over past several days. Poor historian. Seen for similar 1 month ago with x ray showing mets, neg DVT u/s. Denies other pain. Denies CP, SOB, palpitations, headache, dizziness, fever, chills, sweats, abdominal pain, nausea, vomiting, diarrhea, constipation, dysuria, urgency or frequency. Palliative care consult called for goals of care conversation and advance care planning.    PERTINENT PMH REVIEWED:  [x ] YES [ ] NO           SOCIAL HISTORY:  Significant other/partner:  [ ] YES  [ ] NO            Children:  [x ] YES  [ ] NO                   Spiritism/Spirituality:  Substance hx:  [ ] YES   [ ] NO           Tobacco hx:  [ ] YES  [ ] NO             Alcohol hx: [ ] YES  [ ] NO        Home Opioid hx:  [ ] YES  [ ] NO   Living Situation: [x ] Home  [ ] Long term care  [ ] Rehab    FAMILY HISTORY:  No pertinent family history in first degree relatives    [x ] Family history non contributory     BASELINE ADLs (prior to admission):  Independent [ ] moderately [ ] fully   Dependent   [x ] moderately [ ] fully    Code Status: FULL CODE                     MOLST  [ ] YES [X ] NO    Living Will  [ ] YES [X ] NO    Health Care Proxy [ ] YES  [X ] NO      [ ] Surrogate  [ ] HCP  [ ] Guardian:   Vira Lee                                                                Phone#:    Allergies  No Known Allergies    Intolerances    MEDICATIONS  (STANDING):  aspirin enteric coated 325 milliGRAM(s) Oral daily  dextrose 5%. 1000 milliLiter(s) (50 mL/Hr) IV Continuous <Continuous>  dextrose 50% Injectable 12.5 Gram(s) IV Push once  dextrose 50% Injectable 25 Gram(s) IV Push once  dextrose 50% Injectable 25 Gram(s) IV Push once  docusate sodium 100 milliGRAM(s) Oral two times a day  dorzolamide 2%/timolol 0.5% Ophthalmic Solution 1 Drop(s) Both EYES two times a day  enoxaparin Injectable 40 milliGRAM(s) SubCutaneous daily  ferrous    sulfate 325 milliGRAM(s) Oral daily  latanoprost 0.005% Ophthalmic Solution 1 Drop(s) Both EYES at bedtime  nystatin Cream 1 Application(s) Topical two times a day  pantoprazole    Tablet 40 milliGRAM(s) Oral before breakfast  repaglinide 0.5 milliGRAM(s) Oral three times a day    MEDICATIONS  (PRN):  dextrose 40% Gel 15 Gram(s) Oral once PRN Blood Glucose LESS THAN 70 milliGRAM(s)/deciLiter  glucagon  Injectable 1 milliGRAM(s) IntraMuscular once PRN Glucose <70 milliGRAM(s)/deciLiter  polyethylene glycol 3350 17 Gram(s) Oral daily PRN Constipation    PRESENT SYMPTOMS:  Source: [ ] Patient   [x ] Family   [ ] Team     Pain: [x ] YES [ ] NO  Onset:                    Location: right leg pain        Duration:  few months     Character: sharp pain           Aggravating factors: movements                        Relieving factors: pain medications.    Radiation:              Timing:                             Severity:      Dyspnea: [ ] YES [x ] NO - Mild [x ]  Moderate [ ]  Severe [ ]    Anxiety: [x ] YES [ ] NO  Fatigue: [x ] YES [ ] NO   Nausea: [ ] YES [x ] NO  Loss of appetite: [x ] YES [ ] NO   Constipation: [ ] YES [x ] NO     Other Symptoms:  [x ] All other review of systems negative   [ ] Unable to obtain due to poor mentation     Does patient meet criteria for Severe Protein Calorie Malnutrition?  Yes [x ]  No [ ]  PPSV 30% or below [x ]  Anasarca [ ]  Albumin < 2 [ ] Catabolic State [ ] Poor nutritional intake [ x] Significant weight loss [ ]      Palliative Performance Status Version 2:  30 %  ECOG - 4     Vital Signs Last 24 Hrs  T(C): 37 (10 Oct 2018 17:09), Max: 37 (10 Oct 2018 17:09)  T(F): 98.6 (10 Oct 2018 17:09), Max: 98.6 (10 Oct 2018 17:09)  HR: 78 (10 Oct 2018 17:09) (64 - 78)  BP: 131/69 (10 Oct 2018 17:09) (116/53 - 140/76)  BP(mean): --  RR: 17 (10 Oct 2018 17:09) (16 - 17)  SpO2: 96% (10 Oct 2018 17:09) (96% - 99%)    Physical Exam:    General: [ x] Alert,  A&O x  3   [x ] lethargic   [ ] Agitated   [ ] Cachexia   HEENT: [ ] Normal   [x ] Dry mouth   [ ] ET Tube    [ ] Trach   Lungs: [x ] Clear [ ] Rhonchi  [ ] Crackles [ ] Wheezing [ ] Tachypnea  [ ] Audible excessive secretions    Cardiovascular:  [x ] Regular rate and rhythm  [ ] Irregular [ ] Tachycardia   [ ] Bradycardia   Abdomen: [x ] Soft  [ ] Distended  [ x] +BS  [x ] Non tender [ ] Tender  [ ]PEG   [ ] NGT   Last BM:     Genitourinary: [x ] Normal [ ] Incontinent   [ ] Oliguria/Anuria   [ ] Ibarra  Musculoskeletal:  [ ] Normal   [x ] Generalized weakness  [ ] Bedbound  [ ] Edema   Neurological: [x ] No focal deficits  [ ] Cognitive impairment     Skin: [x ] Normal   [ ] Pressure ulcers     LABS:                        9.5    3.62  )-----------( 175      ( 10 Oct 2018 06:52 )             29.6     10-10    138  |  104  |  18  ----------------------------<  54<L>  3.3<L>   |  25  |  0.93    Ca    8.2<L>      10 Oct 2018 06:52  Phos  2.7     10-10  Mg     1.9     10-10    TPro  6.0  /  Alb  2.4<L>  /  TBili  0.9  /  DBili  x   /  AST  29  /  ALT  7<L>  /  AlkPhos  1123<H>  10-09    I&O's Summary    09 Oct 2018 07:01  -  10 Oct 2018 07:00  --------------------------------------------------------  IN: 390 mL / OUT: 200 mL / NET: 190 mL    10 Oct 2018 07:01  -  10 Oct 2018 18:13  --------------------------------------------------------  IN: 400 mL / OUT: 400 mL / NET: 0 mL    RADIOLOGY & ADDITIONAL STUDIES:    Referrals:  Hospice [ ]   Chaplaincy [ ]    Child Life [ ]   Social Work [X ]   Case Management [ ]   Holistic Therapy [ ] +kyphotic posture/abnormal muscle tone/impaired postural control/decreased strength